# Patient Record
Sex: FEMALE | Race: WHITE | Employment: OTHER | URBAN - METROPOLITAN AREA
[De-identification: names, ages, dates, MRNs, and addresses within clinical notes are randomized per-mention and may not be internally consistent; named-entity substitution may affect disease eponyms.]

---

## 2017-01-09 ENCOUNTER — TELEPHONE (OUTPATIENT)
Dept: FAMILY MEDICINE CLINIC | Facility: CLINIC | Age: 82
End: 2017-01-09

## 2017-01-09 NOTE — TELEPHONE ENCOUNTER
Pt's son called. Pt was hospitalized at [facility] after falling off the couch and was treated for UTI. Currently she is at [facility] home. Pt's son is concerned with the meds she was given as she is now very weak and \"out of it\".  Since pt was

## 2017-01-17 ENCOUNTER — TELEPHONE (OUTPATIENT)
Dept: FAMILY MEDICINE CLINIC | Facility: CLINIC | Age: 82
End: 2017-01-17

## 2017-03-18 ENCOUNTER — HOSPITAL ENCOUNTER (EMERGENCY)
Facility: HOSPITAL | Age: 82
Discharge: HOME | End: 2017-03-18
Attending: EMERGENCY MEDICINE
Payer: MEDICARE

## 2017-03-18 VITALS
TEMPERATURE: 98 F | SYSTOLIC BLOOD PRESSURE: 127 MMHG | RESPIRATION RATE: 16 BRPM | HEIGHT: 65 IN | WEIGHT: 142.19 LBS | HEART RATE: 61 BPM | BODY MASS INDEX: 23.69 KG/M2 | DIASTOLIC BLOOD PRESSURE: 63 MMHG | OXYGEN SATURATION: 100 %

## 2017-03-18 DIAGNOSIS — E86.0 DEHYDRATION: ICD-10-CM

## 2017-03-18 DIAGNOSIS — N30.01 ACUTE CYSTITIS WITH HEMATURIA: Primary | ICD-10-CM

## 2017-03-18 LAB
ALBUMIN SERPL-MCNC: 3.5 G/DL (ref 3.5–4.8)
ALP LIVER SERPL-CCNC: 99 U/L (ref 55–142)
ALT SERPL-CCNC: 16 U/L (ref 14–54)
AST SERPL-CCNC: 14 U/L (ref 15–41)
BASOPHILS # BLD AUTO: 0.05 X10(3) UL (ref 0–0.1)
BASOPHILS NFR BLD AUTO: 0.7 %
BILIRUB SERPL-MCNC: 0.2 MG/DL (ref 0.1–2)
BILIRUB UR QL STRIP.AUTO: NEGATIVE
BUN BLD-MCNC: 28 MG/DL (ref 8–20)
CALCIUM BLD-MCNC: 9.5 MG/DL (ref 8.3–10.3)
CHLORIDE: 106 MMOL/L (ref 101–111)
CO2: 30 MMOL/L (ref 22–32)
COLOR UR AUTO: YELLOW
CREAT BLD-MCNC: 1.27 MG/DL (ref 0.55–1.02)
EOSINOPHIL # BLD AUTO: 0.31 X10(3) UL (ref 0–0.3)
EOSINOPHIL NFR BLD AUTO: 4.5 %
ERYTHROCYTE [DISTWIDTH] IN BLOOD BY AUTOMATED COUNT: 14.4 % (ref 11.5–16)
GLUCOSE BLD-MCNC: 123 MG/DL (ref 70–99)
GLUCOSE UR STRIP.AUTO-MCNC: NEGATIVE MG/DL
HCT VFR BLD AUTO: 37.6 % (ref 34–50)
HGB BLD-MCNC: 12.2 G/DL (ref 12–16)
IMMATURE GRANULOCYTE COUNT: 0.01 X10(3) UL (ref 0–1)
IMMATURE GRANULOCYTE RATIO %: 0.1 %
LYMPHOCYTES # BLD AUTO: 2.51 X10(3) UL (ref 0.9–4)
LYMPHOCYTES NFR BLD AUTO: 36.7 %
M PROTEIN MFR SERPL ELPH: 7.3 G/DL (ref 6.1–8.3)
MCH RBC QN AUTO: 31.2 PG (ref 27–33.2)
MCHC RBC AUTO-ENTMCNC: 32.4 G/DL (ref 31–37)
MCV RBC AUTO: 96.2 FL (ref 81–100)
MONOCYTES # BLD AUTO: 0.39 X10(3) UL (ref 0.1–0.6)
MONOCYTES NFR BLD AUTO: 5.7 %
NEUTROPHIL ABS PRELIM: 3.57 X10 (3) UL (ref 1.3–6.7)
NEUTROPHILS # BLD AUTO: 3.57 X10(3) UL (ref 1.3–6.7)
NEUTROPHILS NFR BLD AUTO: 52.3 %
NITRITE UR QL STRIP.AUTO: NEGATIVE
PH UR STRIP.AUTO: 5 [PH] (ref 4.5–8)
PLATELET # BLD AUTO: 215 10(3)UL (ref 150–450)
POTASSIUM SERPL-SCNC: 3.9 MMOL/L (ref 3.6–5.1)
PROT UR STRIP.AUTO-MCNC: 30 MG/DL
RBC # BLD AUTO: 3.91 X10(6)UL (ref 3.8–5.1)
RBC UR QL AUTO: NEGATIVE
RED CELL DISTRIBUTION WIDTH-SD: 50.5 FL (ref 35.1–46.3)
SODIUM SERPL-SCNC: 142 MMOL/L (ref 136–144)
SP GR UR STRIP.AUTO: 1.03 (ref 1–1.03)
UROBILINOGEN UR STRIP.AUTO-MCNC: <2 MG/DL
WBC # BLD AUTO: 6.8 X10(3) UL (ref 4–13)

## 2017-03-18 PROCEDURE — 99284 EMERGENCY DEPT VISIT MOD MDM: CPT

## 2017-03-18 PROCEDURE — 81001 URINALYSIS AUTO W/SCOPE: CPT | Performed by: EMERGENCY MEDICINE

## 2017-03-18 PROCEDURE — 96365 THER/PROPH/DIAG IV INF INIT: CPT

## 2017-03-18 PROCEDURE — 85025 COMPLETE CBC W/AUTO DIFF WBC: CPT | Performed by: EMERGENCY MEDICINE

## 2017-03-18 PROCEDURE — 80053 COMPREHEN METABOLIC PANEL: CPT | Performed by: EMERGENCY MEDICINE

## 2017-03-18 PROCEDURE — 87086 URINE CULTURE/COLONY COUNT: CPT | Performed by: EMERGENCY MEDICINE

## 2017-03-18 PROCEDURE — 87088 URINE BACTERIA CULTURE: CPT | Performed by: EMERGENCY MEDICINE

## 2017-03-18 PROCEDURE — 96361 HYDRATE IV INFUSION ADD-ON: CPT

## 2017-03-18 RX ORDER — SULFAMETHOXAZOLE AND TRIMETHOPRIM 800; 160 MG/1; MG/1
1 TABLET ORAL 2 TIMES DAILY
Qty: 14 TABLET | Refills: 0 | Status: SHIPPED | OUTPATIENT
Start: 2017-03-18 | End: 2017-03-25

## 2017-03-18 NOTE — ED INITIAL ASSESSMENT (HPI)
Per family (son), pt has been off antibiotics for UTI treatment since Thursday. Caregivers at [facility] have noticed increased weakness and confusion over the past couple of days.  Pt is currently alert to herself and birth date only, which is her normal

## 2017-04-14 ENCOUNTER — HOSPITAL ENCOUNTER (INPATIENT)
Facility: HOSPITAL | Age: 82
LOS: 2 days | Discharge: HOME HEALTH | DRG: 690 | End: 2017-04-16
Attending: EMERGENCY MEDICINE | Admitting: INTERNAL MEDICINE
Payer: MEDICARE

## 2017-04-14 ENCOUNTER — APPOINTMENT (OUTPATIENT)
Dept: CT IMAGING | Facility: HOSPITAL | Age: 82
DRG: 690 | End: 2017-04-14
Attending: INTERNAL MEDICINE
Payer: MEDICARE

## 2017-04-14 ENCOUNTER — APPOINTMENT (OUTPATIENT)
Dept: GENERAL RADIOLOGY | Facility: HOSPITAL | Age: 82
DRG: 690 | End: 2017-04-14
Attending: EMERGENCY MEDICINE
Payer: MEDICARE

## 2017-04-14 DIAGNOSIS — R41.82 ALTERED MENTAL STATUS, UNSPECIFIED ALTERED MENTAL STATUS TYPE: Primary | ICD-10-CM

## 2017-04-14 DIAGNOSIS — E87.6 HYPOKALEMIA: ICD-10-CM

## 2017-04-14 PROBLEM — G93.40 ACUTE ENCEPHALOPATHY: Status: ACTIVE | Noted: 2017-04-14

## 2017-04-14 PROBLEM — R53.81 PHYSICAL DECONDITIONING: Status: ACTIVE | Noted: 2017-04-14

## 2017-04-14 PROBLEM — D69.6 THROMBOCYTOPENIA (HCC): Status: ACTIVE | Noted: 2017-04-14

## 2017-04-14 PROCEDURE — 99223 1ST HOSP IP/OBS HIGH 75: CPT | Performed by: INTERNAL MEDICINE

## 2017-04-14 PROCEDURE — 70450 CT HEAD/BRAIN W/O DYE: CPT

## 2017-04-14 PROCEDURE — 71010 XR CHEST AP PORTABLE  (CPT=71010): CPT

## 2017-04-14 RX ORDER — DIAZEPAM 5 MG/ML
2.5 INJECTION, SOLUTION INTRAMUSCULAR; INTRAVENOUS ONCE
Status: COMPLETED | OUTPATIENT
Start: 2017-04-14 | End: 2017-04-14

## 2017-04-14 RX ORDER — CLONAZEPAM 0.5 MG/1
0.25 TABLET ORAL EVERY 6 HOURS PRN
Status: DISCONTINUED | OUTPATIENT
Start: 2017-04-14 | End: 2017-04-16

## 2017-04-14 RX ORDER — ESCITALOPRAM OXALATE 10 MG/1
10 TABLET ORAL EVERY MORNING
Status: DISCONTINUED | OUTPATIENT
Start: 2017-04-14 | End: 2017-04-16

## 2017-04-14 RX ORDER — SODIUM CHLORIDE 9 MG/ML
INJECTION, SOLUTION INTRAVENOUS CONTINUOUS
Status: DISCONTINUED | OUTPATIENT
Start: 2017-04-14 | End: 2017-04-16

## 2017-04-14 RX ORDER — CITALOPRAM 10 MG/1
30 TABLET ORAL NIGHTLY
Status: ON HOLD | COMMUNITY
End: 2020-12-22

## 2017-04-14 RX ORDER — DIPHENHYDRAMINE HYDROCHLORIDE 50 MG/ML
50 INJECTION INTRAMUSCULAR; INTRAVENOUS ONCE
Status: COMPLETED | OUTPATIENT
Start: 2017-04-14 | End: 2017-04-14

## 2017-04-14 RX ORDER — LEVOTHYROXINE SODIUM 0.12 MG/1
125 TABLET ORAL
Status: DISCONTINUED | OUTPATIENT
Start: 2017-04-15 | End: 2017-04-16

## 2017-04-14 RX ORDER — NITROFURANTOIN 25; 75 MG/1; MG/1
100 CAPSULE ORAL 2 TIMES DAILY
COMMUNITY
End: 2017-04-14

## 2017-04-14 RX ORDER — LORAZEPAM 2 MG/ML
0.5 INJECTION INTRAMUSCULAR ONCE
Status: COMPLETED | OUTPATIENT
Start: 2017-04-14 | End: 2017-04-14

## 2017-04-14 RX ORDER — SODIUM CHLORIDE 9 MG/ML
1000 INJECTION, SOLUTION INTRAVENOUS ONCE
Status: COMPLETED | OUTPATIENT
Start: 2017-04-14 | End: 2017-04-14

## 2017-04-14 RX ORDER — HALOPERIDOL 5 MG/ML
1 INJECTION INTRAMUSCULAR EVERY 6 HOURS PRN
Status: DISCONTINUED | OUTPATIENT
Start: 2017-04-14 | End: 2017-04-15

## 2017-04-14 RX ORDER — DONEPEZIL HYDROCHLORIDE 10 MG/1
10 TABLET, FILM COATED ORAL NIGHTLY
Status: DISCONTINUED | OUTPATIENT
Start: 2017-04-14 | End: 2017-04-16

## 2017-04-14 RX ORDER — METHYLPREDNISOLONE SODIUM SUCCINATE 125 MG/2ML
125 INJECTION, POWDER, LYOPHILIZED, FOR SOLUTION INTRAMUSCULAR; INTRAVENOUS ONCE
Status: COMPLETED | OUTPATIENT
Start: 2017-04-14 | End: 2017-04-14

## 2017-04-14 RX ORDER — DONEPEZIL HYDROCHLORIDE 10 MG/1
10 TABLET, FILM COATED ORAL NIGHTLY
Status: ON HOLD | COMMUNITY
End: 2021-02-08

## 2017-04-14 RX ORDER — LEVOTHYROXINE SODIUM 0.05 MG/1
50 TABLET ORAL
Status: ON HOLD | COMMUNITY
End: 2021-02-05

## 2017-04-14 RX ORDER — SODIUM CHLORIDE 9 MG/ML
INJECTION, SOLUTION INTRAVENOUS CONTINUOUS
Status: ACTIVE | OUTPATIENT
Start: 2017-04-14 | End: 2017-04-14

## 2017-04-14 RX ORDER — ONDANSETRON 2 MG/ML
4 INJECTION INTRAMUSCULAR; INTRAVENOUS EVERY 6 HOURS PRN
Status: DISCONTINUED | OUTPATIENT
Start: 2017-04-14 | End: 2017-04-16

## 2017-04-14 RX ORDER — ENOXAPARIN SODIUM 100 MG/ML
40 INJECTION SUBCUTANEOUS DAILY
Status: DISCONTINUED | OUTPATIENT
Start: 2017-04-14 | End: 2017-04-16

## 2017-04-14 RX ORDER — ACETAMINOPHEN 325 MG/1
650 TABLET ORAL EVERY 6 HOURS PRN
Status: DISCONTINUED | OUTPATIENT
Start: 2017-04-14 | End: 2017-04-16

## 2017-04-14 RX ORDER — SODIUM CHLORIDE 9 MG/ML
INJECTION, SOLUTION INTRAVENOUS ONCE
Status: COMPLETED | OUTPATIENT
Start: 2017-04-14 | End: 2017-04-14

## 2017-04-14 NOTE — PROGRESS NOTES
NURSING ADMISSION NOTE      Patient admitted via Cart  Oriented to room. Safety precautions initiated. Bed in low position. Call light in reach. Daughter called to complete admission navigator.

## 2017-04-14 NOTE — H&P
[facility]  History and Physical     Antonio Granados Patient Status:  Emergency    1935 MRN [id number]   Location [facility] Attending Gabriel Solis MD   Hosp Day # 0 PCP Aaliyah Bobo MD     Chief Complaint: A (36.7 °C) (Temporal)  Resp 18  Ht 5' 3\" (1.6 m)  Wt 135 lb (61.236 kg)  BMI 23.92 kg/m2  SpO2 99%  General: No acute distress drowsy   HEENT: Normocephalic atraumatic. Moist mucous membranes. EOM-I. PERRLA. Anicteric.  Old right eyebrow hematoma/small lace dehydration, IVF  8. Metabolic acidosis   9. Dementia with known behavioral disturbances  10.  Physical deconditioning - PT OT eval      Quality:  · DVT Prophylaxis: lovenox   · CODE status: dnr  · Acuna: none    Plan of care discussed with patient, her maged

## 2017-04-14 NOTE — ED NOTES
Patient continues on the cardiac monitor along with o2 sat 100% she finally is sleeping will continue to assess her rash

## 2017-04-14 NOTE — ED INITIAL ASSESSMENT (HPI)
Pt here for weakness, confusion and hypotension. Pt fell one week ago and has right sided head hematoma. Family notes that she is confused more so that usual and that this happens when she has a UTI.

## 2017-04-14 NOTE — ED NOTES
Lactic acid returned we will be following the Septic Protocol total will be 1,836 as of now patient had 1100

## 2017-04-14 NOTE — ED PROVIDER NOTES
Patient Seen in: [facility] Emergency Department    History   Patient presents with:  Hypotension (cardiovascular)    Stated Complaint: hypotension    HPI    31-year-old female that comes the hospital the chief complaint of increased weakness over t Smoking Status: Former Smoker                   Packs/Day: 0.00  Years:           Types: Cigarettes      Quit date: 06/25/1983    Smokeless Status: Never Used                        Alcohol Use: Yes                Comment: social      Review of Systems Notable for the following:     Urine Color Alda (*)     Clarity Urine Hazy (*)     Leukocyte Esterase Urine Trace (*)     Squamous Epi.  Cells Moderate (*)     Hyaline Casts Present (*)     Mucous Urine 3+ (*)     All other components within normal limits STATED HISTORY: (As transcribed by Technologist)  Patient with weakness, confusion and hypotension.         FINDINGS:  Cardiac size and pulmonary vasculature are within normal limits. No pleural effusions. No pneumothorax.  The minimal left basal atelectasi

## 2017-04-14 NOTE — ED NOTES
Straight cath patient noted redness to all ofo her extremities I called Dr. Nam Hernandez to the bedside sstarted second line and will administer benadryl and solumedrol

## 2017-04-15 ENCOUNTER — APPOINTMENT (OUTPATIENT)
Dept: ULTRASOUND IMAGING | Facility: HOSPITAL | Age: 82
DRG: 690 | End: 2017-04-15
Attending: UROLOGY
Payer: MEDICARE

## 2017-04-15 PROCEDURE — 99232 SBSQ HOSP IP/OBS MODERATE 35: CPT | Performed by: INTERNAL MEDICINE

## 2017-04-15 PROCEDURE — 76770 US EXAM ABDO BACK WALL COMP: CPT

## 2017-04-15 RX ORDER — CLONAZEPAM 0.5 MG/1
0.25 TABLET ORAL 2 TIMES DAILY
Status: DISCONTINUED | OUTPATIENT
Start: 2017-04-15 | End: 2017-04-16

## 2017-04-15 RX ORDER — HALOPERIDOL 5 MG/ML
1 INJECTION INTRAMUSCULAR EVERY 6 HOURS PRN
Status: DISCONTINUED | OUTPATIENT
Start: 2017-04-15 | End: 2017-04-16

## 2017-04-15 NOTE — PHYSICAL THERAPY NOTE
PHYSICAL THERAPY QUICK EVALUATION - INPATIENT    Room Number: 357/357-A  Evaluation Date: 4/15/2017  Presenting Problem: acute encephalopathy due to possible infectious process  Physician Order: PT Eval and Treat    Problem List  Principal Problem:    Acut wheelchair, bedside commode, etc.): A Little   -   Moving from lying on back to sitting on the side of the bed?: A Little   How much help from another person does the patient currently need. ..   -   Moving to and from a bed to a chair (including a wheelcha Assisted living;24 hour care/supervision    PLAN  Patient has been evaluated and presents with no skilled Physical Therapy needs at this time. Patient discharged from Physical Therapy services.   Please re-order if a new functional limitation presents duri

## 2017-04-15 NOTE — CONSULTS
[facility]  Report of Consultation    Gayla Haro Patient Status:  Inpatient    1935 MRN [id number]   [facility] 3SW-A Attending Bryan Townsend MD   Hosp Day # 1 PCP Alexa Longoria MD     Reason for Consultation:  Recurrent uti Systems:  Pertinent items are noted in HPI.     Physical Exam:  abd soft, no cva tenderness  Pelvic deferred to eventual cysto    Laboratory Data:         Lab Results  Component Value Date   COLORUR Alda 04/14/2017   CLARITY Hazy 04/14/2017   SPECGRAVITY 1

## 2017-04-15 NOTE — CM/SW NOTE
Received order for discharge planning/assessment. SW tried to reach pt's daughter listed on facesheet by phone. Left a voice message requesting she call SW back. Will await family call back.      [facility]

## 2017-04-15 NOTE — PLAN OF CARE
Patient resting in bed. Drowsy,arousable & verbally responsive. Uncooperative of care & combative,swinging arms on staff during care. Skin is intact apart from the old laceration which is steristipped on the R eyebrow & bruise on the R upper cheek due to a re

## 2017-04-15 NOTE — PROGRESS NOTES
[facility]  Progress Note     Josafat Corley Patient Status:  Inpatient    1935 MRN [id number]   [facility] 3SW-A Attending Surya Longoria MD   Hosp Day # 1 PCP Ximena Swartz MD     Chief Complaint: AMS    S: Patient more awak head    2. Cont abx    3. Blood cx and urine cx pending    2. Probable cystitis without hematuria    1. on empiric zosyn    2. Await urine cx result    3. She just completed another round of abx as outpt  4. Urology eval for recurrent UTI  5.  I have explai

## 2017-04-16 VITALS
HEIGHT: 63 IN | SYSTOLIC BLOOD PRESSURE: 142 MMHG | DIASTOLIC BLOOD PRESSURE: 78 MMHG | WEIGHT: 135 LBS | RESPIRATION RATE: 16 BRPM | BODY MASS INDEX: 23.92 KG/M2 | OXYGEN SATURATION: 99 % | TEMPERATURE: 98 F | HEART RATE: 53 BPM

## 2017-04-16 PROCEDURE — 99239 HOSP IP/OBS DSCHRG MGMT >30: CPT | Performed by: INTERNAL MEDICINE

## 2017-04-16 RX ORDER — AMOXICILLIN AND CLAVULANATE POTASSIUM 875; 125 MG/1; MG/1
1 TABLET, FILM COATED ORAL 2 TIMES DAILY
Qty: 14 TABLET | Refills: 0 | Status: SHIPPED | OUTPATIENT
Start: 2017-04-16 | End: 2017-04-16

## 2017-04-16 RX ORDER — CEPHALEXIN 250 MG/1
250 CAPSULE ORAL 2 TIMES DAILY
Qty: 10 CAPSULE | Refills: 0 | Status: SHIPPED | OUTPATIENT
Start: 2017-04-16 | End: 2017-04-16

## 2017-04-16 RX ORDER — AMOXICILLIN AND CLAVULANATE POTASSIUM 875; 125 MG/1; MG/1
1 TABLET, FILM COATED ORAL 2 TIMES DAILY
Qty: 14 TABLET | Refills: 0 | Status: SHIPPED | OUTPATIENT
Start: 2017-04-16 | End: 2017-04-26

## 2017-04-16 NOTE — PROGRESS NOTES
NURSING DISCHARGE NOTE    Discharged Home via Wheelchair. Accompanied by Family member  Belongings Taken by patient/family. All discharge instructions reviewed with daughter and caregiver. All questions answered.

## 2017-04-16 NOTE — CM/SW NOTE
04/16/17 1400   CM/SW Referral Data   Referral Source Physician   Reason for Referral Discharge planning;Psychoscial assessment   Informant (Daughter/Elyse)   Pertinent Medical Hx   Primary Care Physician Name Zoraida Mena.     Patient Info   Patient's Mental

## 2017-04-16 NOTE — DISCHARGE SUMMARY
[facility]  DISCHARGE SUMMARY     Edson Galeana Patient Status:  Inpatient    1935 MRN [id number]   [facility] 3SW-A Attending No att. providers found   Hosp Day # 2 PCP Guevara Chang MD     Date of Admission: 2017  Date N/V/diarrhea/cough/CP/SOB/ melena/hematochezia reported. Brief Synopsis:   Patient admitted for AMS and weakness likely due to UTI. Her outpatient cultures have shown staph epidermidis , however her hospital cultures to date are negative.  She has respo These medications were sent to [facility] [address] [facility] [address] [facility], [address] [facility] [address], [phone], [address] [facility], [place] [address]     Phone:  [phone]    - Amoxicillin-Pot Clavulanate [phone]

## 2017-04-16 NOTE — PROGRESS NOTES
[address] Patient Status:  Inpatient    1935 MRN [id number]   [facility] 3SW-A Attending Surya Longoria MD   Hosp Day # 2 PCP Ximena Swartz MD     Subjective: Interval History: has no complaint of dysuria. Naomi Nichole tetracycline and vanco.  Pt clinically worsened on macrobid. Will send home on augmentin for 7 days. F/u with Dr Melissa Estes at [facility].

## 2017-04-16 NOTE — PLAN OF CARE
Impaired Functional Mobility    • Achieve highest/safest level of mobility/gait Progressing        NEUROLOGICAL - ADULT    • Achieves stable or improved neurological status Progressing    • Absence of seizures Progressing    • Remains free of injury relate

## 2017-04-16 NOTE — OCCUPATIONAL THERAPY NOTE
OCCUPATIONAL THERAPY QUICK EVALUATION - INPATIENT    Room Number: 357/357-A  Evaluation Date: 4/16/2017     Type of Evaluation: Quick Eval  Presenting Problem: Acute encephalopathy    Physician Order: IP Consult to Occupational Therapy  Reason for Therapy: living staff help with additional morning and evening routines.  She reports patient is typically total assist for all UB/LB dressing, UB/LB bathing, shower transfers, toileting, and grooming; she reports patient can attempt toilet transfers and bed mobilit Taking care of personal grooming such as brushing teeth?: Total  -   Eating meals?: A Little    AM-PAC Score:  Score: 8  Approx Degree of Impairment: 85.69%  Standardized Score (AM-PAC Scale): 22.86  CMS Modifier (G-Code): CM    FUNCTIONAL TRANSFER ASSESSM no skilled Occupational Therapy needs at this time. Patient discharged from Occupational Therapy services. Please re-order if a new functional limitation presents during this admission.     Patient was able to achieve the following goals:  Patient able to

## 2017-04-19 ENCOUNTER — TELEPHONE (OUTPATIENT)
Dept: FAMILY MEDICINE CLINIC | Facility: CLINIC | Age: 82
End: 2017-04-19

## 2017-12-31 ENCOUNTER — APPOINTMENT (OUTPATIENT)
Dept: CT IMAGING | Facility: HOSPITAL | Age: 82
End: 2017-12-31
Attending: EMERGENCY MEDICINE
Payer: MEDICARE

## 2017-12-31 ENCOUNTER — HOSPITAL ENCOUNTER (OUTPATIENT)
Facility: HOSPITAL | Age: 82
Setting detail: OBSERVATION
Discharge: NURSING, REHAB OR LONG-TERM CARE | End: 2018-01-02
Attending: EMERGENCY MEDICINE | Admitting: INTERNAL MEDICINE
Payer: MEDICARE

## 2017-12-31 ENCOUNTER — APPOINTMENT (OUTPATIENT)
Dept: GENERAL RADIOLOGY | Facility: HOSPITAL | Age: 82
End: 2017-12-31
Attending: EMERGENCY MEDICINE
Payer: MEDICARE

## 2017-12-31 DIAGNOSIS — G45.9 TRANSIENT CEREBRAL ISCHEMIA, UNSPECIFIED TYPE: Primary | ICD-10-CM

## 2017-12-31 LAB
ALBUMIN SERPL-MCNC: 3.6 G/DL (ref 3.5–4.8)
ALP LIVER SERPL-CCNC: 84 U/L (ref 55–142)
ALT SERPL-CCNC: 12 U/L (ref 14–54)
AST SERPL-CCNC: 12 U/L (ref 15–41)
BASOPHILS # BLD AUTO: 0.03 X10(3) UL (ref 0–0.1)
BASOPHILS NFR BLD AUTO: 0.4 %
BILIRUB SERPL-MCNC: 0.3 MG/DL (ref 0.1–2)
BILIRUB UR QL STRIP.AUTO: NEGATIVE
BUN BLD-MCNC: 21 MG/DL (ref 8–20)
CALCIUM BLD-MCNC: 9.2 MG/DL (ref 8.3–10.3)
CHLORIDE: 102 MMOL/L (ref 101–111)
CLARITY UR REFRACT.AUTO: CLEAR
CO2: 29 MMOL/L (ref 22–32)
COLOR UR AUTO: YELLOW
CREAT BLD-MCNC: 1.08 MG/DL (ref 0.55–1.02)
EOSINOPHIL # BLD AUTO: 0.17 X10(3) UL (ref 0–0.3)
EOSINOPHIL NFR BLD AUTO: 2.2 %
ERYTHROCYTE [DISTWIDTH] IN BLOOD BY AUTOMATED COUNT: 12.7 % (ref 11.5–16)
FREE T4: 2 NG/DL (ref 0.9–1.8)
GLUCOSE BLD-MCNC: 85 MG/DL (ref 70–99)
GLUCOSE BLD-MCNC: 91 MG/DL (ref 65–99)
GLUCOSE UR STRIP.AUTO-MCNC: NEGATIVE MG/DL
HCT VFR BLD AUTO: 39.2 % (ref 34–50)
HGB BLD-MCNC: 12.8 G/DL (ref 12–16)
IMMATURE GRANULOCYTE COUNT: 0.02 X10(3) UL (ref 0–1)
IMMATURE GRANULOCYTE RATIO %: 0.3 %
INR BLD: 1.03 (ref 0.89–1.11)
KETONES UR STRIP.AUTO-MCNC: NEGATIVE MG/DL
LEUKOCYTE ESTERASE UR QL STRIP.AUTO: NEGATIVE
LYMPHOCYTES # BLD AUTO: 2.15 X10(3) UL (ref 0.9–4)
LYMPHOCYTES NFR BLD AUTO: 27.6 %
M PROTEIN MFR SERPL ELPH: 6.9 G/DL (ref 6.1–8.3)
MCH RBC QN AUTO: 30.8 PG (ref 27–33.2)
MCHC RBC AUTO-ENTMCNC: 32.7 G/DL (ref 31–37)
MCV RBC AUTO: 94.5 FL (ref 81–100)
MONOCYTES # BLD AUTO: 0.43 X10(3) UL (ref 0.1–0.6)
MONOCYTES NFR BLD AUTO: 5.5 %
NEUTROPHIL ABS PRELIM: 4.98 X10 (3) UL (ref 1.3–6.7)
NEUTROPHILS # BLD AUTO: 4.98 X10(3) UL (ref 1.3–6.7)
NEUTROPHILS NFR BLD AUTO: 64 %
NITRITE UR QL STRIP.AUTO: NEGATIVE
PH UR STRIP.AUTO: 6 [PH] (ref 4.5–8)
PLATELET # BLD AUTO: 181 10(3)UL (ref 150–450)
POTASSIUM SERPL-SCNC: 4.2 MMOL/L (ref 3.6–5.1)
PROT UR STRIP.AUTO-MCNC: NEGATIVE MG/DL
PSA SERPL DL<=0.01 NG/ML-MCNC: 13.5 SECONDS (ref 12–14.3)
RBC # BLD AUTO: 4.15 X10(6)UL (ref 3.8–5.1)
RBC UR QL AUTO: NEGATIVE
RED CELL DISTRIBUTION WIDTH-SD: 44.1 FL (ref 35.1–46.3)
SODIUM SERPL-SCNC: 138 MMOL/L (ref 136–144)
SP GR UR STRIP.AUTO: 1.02 (ref 1–1.03)
TROPONIN: <0.046 NG/ML (ref ?–0.05)
TSI SER-ACNC: <0.005 MIU/ML (ref 0.35–5.5)
UROBILINOGEN UR STRIP.AUTO-MCNC: <2 MG/DL
WBC # BLD AUTO: 7.8 X10(3) UL (ref 4–13)

## 2017-12-31 PROCEDURE — 71010 XR CHEST AP PORTABLE  (CPT=71010): CPT | Performed by: EMERGENCY MEDICINE

## 2017-12-31 PROCEDURE — 72125 CT NECK SPINE W/O DYE: CPT | Performed by: EMERGENCY MEDICINE

## 2017-12-31 PROCEDURE — 70450 CT HEAD/BRAIN W/O DYE: CPT | Performed by: EMERGENCY MEDICINE

## 2017-12-31 PROCEDURE — 99220 INITIAL OBSERVATION CARE,LEVL III: CPT | Performed by: HOSPITALIST

## 2017-12-31 RX ORDER — ACETAMINOPHEN 650 MG/1
650 SUPPOSITORY RECTAL EVERY 4 HOURS PRN
Status: DISCONTINUED | OUTPATIENT
Start: 2017-12-31 | End: 2018-01-02

## 2017-12-31 RX ORDER — SODIUM CHLORIDE 9 MG/ML
INJECTION, SOLUTION INTRAVENOUS CONTINUOUS
Status: ACTIVE | OUTPATIENT
Start: 2017-12-31 | End: 2017-12-31

## 2017-12-31 RX ORDER — DOCUSATE SODIUM 100 MG/1
100 CAPSULE, LIQUID FILLED ORAL 2 TIMES DAILY
Status: DISCONTINUED | OUTPATIENT
Start: 2017-12-31 | End: 2018-01-02

## 2017-12-31 RX ORDER — METOCLOPRAMIDE HYDROCHLORIDE 5 MG/ML
5 INJECTION INTRAMUSCULAR; INTRAVENOUS EVERY 8 HOURS PRN
Status: DISCONTINUED | OUTPATIENT
Start: 2017-12-31 | End: 2018-01-02

## 2017-12-31 RX ORDER — POLYETHYLENE GLYCOL 3350 17 G/17G
17 POWDER, FOR SOLUTION ORAL DAILY PRN
Status: DISCONTINUED | OUTPATIENT
Start: 2017-12-31 | End: 2018-01-02

## 2017-12-31 RX ORDER — FAMOTIDINE 10 MG/ML
20 INJECTION, SOLUTION INTRAVENOUS NIGHTLY
Status: DISCONTINUED | OUTPATIENT
Start: 2017-12-31 | End: 2018-01-02

## 2017-12-31 RX ORDER — ONDANSETRON 2 MG/ML
4 INJECTION INTRAMUSCULAR; INTRAVENOUS EVERY 6 HOURS PRN
Status: DISCONTINUED | OUTPATIENT
Start: 2017-12-31 | End: 2018-01-02

## 2017-12-31 RX ORDER — ASPIRIN 325 MG
325 TABLET ORAL DAILY
Status: DISCONTINUED | OUTPATIENT
Start: 2017-12-31 | End: 2018-01-02

## 2017-12-31 RX ORDER — ACETAMINOPHEN 325 MG/1
650 TABLET ORAL EVERY 6 HOURS PRN
Status: DISCONTINUED | OUTPATIENT
Start: 2017-12-31 | End: 2017-12-31 | Stop reason: SDUPTHER

## 2017-12-31 RX ORDER — SODIUM CHLORIDE 9 MG/ML
INJECTION, SOLUTION INTRAVENOUS CONTINUOUS
Status: DISCONTINUED | OUTPATIENT
Start: 2017-12-31 | End: 2018-01-02

## 2017-12-31 RX ORDER — ASPIRIN 300 MG
300 SUPPOSITORY, RECTAL RECTAL DAILY
Status: DISCONTINUED | OUTPATIENT
Start: 2017-12-31 | End: 2018-01-02

## 2017-12-31 RX ORDER — HALOPERIDOL 5 MG/ML
2 INJECTION INTRAMUSCULAR EVERY 6 HOURS PRN
Status: DISCONTINUED | OUTPATIENT
Start: 2017-12-31 | End: 2018-01-02

## 2017-12-31 RX ORDER — METHENAMINE HIPPURATE 1000 MG/1
1000 TABLET ORAL DAILY
Status: DISCONTINUED | OUTPATIENT
Start: 2017-12-31 | End: 2018-01-02

## 2017-12-31 RX ORDER — DONEPEZIL HYDROCHLORIDE 10 MG/1
10 TABLET, FILM COATED ORAL NIGHTLY
Status: DISCONTINUED | OUTPATIENT
Start: 2017-12-31 | End: 2018-01-02

## 2017-12-31 RX ORDER — ATORVASTATIN CALCIUM 80 MG/1
80 TABLET, FILM COATED ORAL NIGHTLY
Status: DISCONTINUED | OUTPATIENT
Start: 2017-12-31 | End: 2018-01-02

## 2017-12-31 RX ORDER — TEMAZEPAM 15 MG/1
15 CAPSULE ORAL NIGHTLY PRN
Status: DISCONTINUED | OUTPATIENT
Start: 2017-12-31 | End: 2017-12-31

## 2017-12-31 RX ORDER — CLONAZEPAM 0.5 MG/1
0.25 TABLET ORAL 2 TIMES DAILY
Status: DISCONTINUED | OUTPATIENT
Start: 2017-12-31 | End: 2018-01-02

## 2017-12-31 RX ORDER — CLONAZEPAM 0.5 MG/1
0.25 TABLET ORAL EVERY 6 HOURS PRN
Status: DISCONTINUED | OUTPATIENT
Start: 2017-12-31 | End: 2018-01-02

## 2017-12-31 RX ORDER — FAMOTIDINE 20 MG/1
20 TABLET ORAL NIGHTLY
Status: DISCONTINUED | OUTPATIENT
Start: 2017-12-31 | End: 2018-01-02

## 2017-12-31 RX ORDER — ESCITALOPRAM OXALATE 10 MG/1
10 TABLET ORAL EVERY MORNING
Status: DISCONTINUED | OUTPATIENT
Start: 2018-01-01 | End: 2018-01-02

## 2017-12-31 RX ORDER — LABETALOL HYDROCHLORIDE 5 MG/ML
10 INJECTION, SOLUTION INTRAVENOUS EVERY 10 MIN PRN
Status: DISCONTINUED | OUTPATIENT
Start: 2017-12-31 | End: 2018-01-01

## 2017-12-31 RX ORDER — BISACODYL 10 MG
10 SUPPOSITORY, RECTAL RECTAL
Status: DISCONTINUED | OUTPATIENT
Start: 2017-12-31 | End: 2018-01-02

## 2017-12-31 RX ORDER — ACETAMINOPHEN 325 MG/1
650 TABLET ORAL EVERY 4 HOURS PRN
Status: DISCONTINUED | OUTPATIENT
Start: 2017-12-31 | End: 2018-01-02

## 2017-12-31 RX ORDER — LEVOTHYROXINE SODIUM 0.12 MG/1
125 TABLET ORAL
Status: DISCONTINUED | OUTPATIENT
Start: 2018-01-01 | End: 2018-01-02

## 2017-12-31 RX ORDER — SENNOSIDES 8.6 MG
17.2 TABLET ORAL NIGHTLY
Status: DISCONTINUED | OUTPATIENT
Start: 2017-12-31 | End: 2018-01-02

## 2017-12-31 RX ORDER — ASPIRIN 81 MG/1
324 TABLET, CHEWABLE ORAL ONCE
Status: COMPLETED | OUTPATIENT
Start: 2017-12-31 | End: 2017-12-31

## 2017-12-31 RX ORDER — SODIUM PHOSPHATE, DIBASIC AND SODIUM PHOSPHATE, MONOBASIC 7; 19 G/133ML; G/133ML
1 ENEMA RECTAL ONCE AS NEEDED
Status: DISCONTINUED | OUTPATIENT
Start: 2017-12-31 | End: 2018-01-02

## 2017-12-31 NOTE — ED PROVIDER NOTES
Patient Seen in: [facility] Emergency Department    History   Patient presents with:  Altered Mental Status (neurologic)    Stated Complaint: AMS    HPI    Patient is an 31-year-old with a history of dementia hypothyroidism hypertension.   History is therefore taken.     Physical Exam   ED Triage Vitals [12/31/17 1521]  BP: 117/77  Pulse: 69  Resp: 18  Temp: 97.7 °F (36.5 °C)  Temp src: Temporal  SpO2: 94 %  O2 Device: None (Room air)    Current:/73   Pulse 53   Temp 97.7 °F (36.5 °C) (Temporal) for panel order CBC WITH DIFFERENTIAL WITH PLATELET.   Procedure                               Abnormality         Status                     ---------                               -----------         ------                     CBC W/ DIFFERENTIAL[[id number] really participate. Her caregiver was concerned she was having pain in her legs and chest earlier.   She does not have any pain with range of motion of any of her extremities or tenderness of her hips or low back, she does not have pain with rolling over i

## 2017-12-31 NOTE — ED INITIAL ASSESSMENT (HPI)
Per EMS report, patient had difficulty swallowing and speaking this morning. Sx have since resolved. Last known well time last night. Patient A&O to self only, which is her baseline per report.     Per caregiver- she gets frequent UTIs and has had increased
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
## 2018-01-01 ENCOUNTER — APPOINTMENT (OUTPATIENT)
Dept: CT IMAGING | Facility: HOSPITAL | Age: 83
End: 2018-01-01
Attending: Other
Payer: MEDICARE

## 2018-01-01 LAB
CHOLEST SMN-MCNC: 189 MG/DL (ref ?–200)
EST. AVERAGE GLUCOSE BLD GHB EST-MCNC: 120 MG/DL (ref 68–126)
GLUCOSE BLD-MCNC: 77 MG/DL (ref 65–99)
GLUCOSE BLD-MCNC: 79 MG/DL (ref 65–99)
GLUCOSE BLD-MCNC: 80 MG/DL (ref 65–99)
GLUCOSE BLD-MCNC: 93 MG/DL (ref 65–99)
HBA1C MFR BLD HPLC: 5.8 % (ref ?–5.7)
HDLC SERPL-MCNC: 54 MG/DL (ref 45–?)
HDLC SERPL: 3.5 {RATIO} (ref ?–4.44)
INR BLD: 1.1 (ref 0.89–1.11)
LDLC SERPL CALC-MCNC: 117 MG/DL (ref ?–130)
NONHDLC SERPL-MCNC: 135 MG/DL (ref ?–130)
PSA SERPL DL<=0.01 NG/ML-MCNC: 14.2 SECONDS (ref 12–14.3)
TRIGL SERPL-MCNC: 90 MG/DL (ref ?–150)
VLDLC SERPL CALC-MCNC: 18 MG/DL (ref 5–40)

## 2018-01-01 PROCEDURE — 99205 OFFICE O/P NEW HI 60 MIN: CPT | Performed by: OTHER

## 2018-01-01 PROCEDURE — 99225 SUBSEQUENT OBSERVATION CARE: CPT | Performed by: HOSPITALIST

## 2018-01-01 RX ORDER — ASPIRIN 81 MG/1
81 TABLET, CHEWABLE ORAL DAILY
Qty: 30 TABLET | Refills: 0 | Status: SHIPPED | OUTPATIENT
Start: 2018-01-01 | End: 2018-01-31

## 2018-01-01 RX ORDER — MEMANTINE HYDROCHLORIDE 10 MG/1
10 TABLET ORAL 2 TIMES DAILY
Status: ON HOLD | COMMUNITY
End: 2021-02-08

## 2018-01-01 NOTE — PLAN OF CARE
Assumed care at 0700. Alert to self (baseline), follows some commands, agitated at times when getting vitals. SB on tele. Denies pain. Family at bedside updated on poc. Incontinent and briefed, changed frequently. Awating CT and carotid doppler.  Will arturo

## 2018-01-01 NOTE — CONSULTS
[facility] Pharmacy Note:  Renal Dose Adjustment for Metoclopramide (REGLAN)    Edson Galeana has been prescribed Metoclopramide (REGLAN) 10 mg every 8 hours as needed for n/v.    Estimated Creatinine Clearance: 33.2 mL/min (based on SCr of 1.08 mg/dL (H)).

## 2018-01-01 NOTE — PLAN OF CARE
Admit patient to unit at [address] [place] [address]. Pt alert and oriented xself - this is patient's baseline. Neuro checks Q2 per stroke protocol. No changes. VSS on room air, BP's soft. SB per tele, HR in 40's throughout night. Dr. Vandana Rodriguez notified. EKG obtained.  Pt asymp

## 2018-01-01 NOTE — H&P
[facility]  History and Physical     Alejandraanna Yu Patient Status:  Emergency    1935 MRN [id number]   Location [address] [facility] Attending Jenifer Sinclair MD   Hosp Day # 0 PCP Behzad Foreman MD     Chief Complaint: Levothyroxine Sodium 125 MCG Oral Tab Take 125 mcg by mouth before breakfast. Disp:  Rfl:    ClonazePAM 0.25 mg Oral Tab Take 0.25 mg by mouth 2 (two) times daily. Disp:  Rfl:    Donepezil HCl 10 MG Oral Tab Take 10 mg by mouth nightly.  Disp:  Rfl:    Cl 1606   PTP  13.5   INR  1.03       Recent Labs   Lab  12/31/17   1606   TROP  <0.046       Imaging: Imaging data reviewed in Epic. ASSESSMENT / PLAN:     1. Transient right-sided weakness and dysarthria with worsening confusion  1.  Mental status now b

## 2018-01-01 NOTE — PROGRESS NOTES
[facility]  Progress Note     Shannon Gutierrez Patient Status:  Observation    1935 MRN [id number]   [facility] 7NE-A Attending Jose Myles MD   Hosp Day # 0 PCP Darerll Vidal MD     Chief Complaint: right sided weakness    S Senna  17.2 mg Oral Nightly   • docusate sodium  100 mg Oral BID   • famoTIDine  20 mg Oral Nightly    Or   • famoTIDine  20 mg Intravenous Nightly       ASSESSMENT / PLAN:     1. Transient right-sided weakness and dysarthria with worsening confusion  1.  [place]

## 2018-01-01 NOTE — PHYSICAL THERAPY NOTE
Attempted PT eval at this time, however PCT currently feeding Pt breakfast. Will re-attempt later today as time allows.

## 2018-01-01 NOTE — CONSULTS
Neurology H&P    Celestino Goyal Patient Status:  Observation    1935 MRN [id number]   [facility] 7NE-A Attending Maikol Dominguez MD   Hosp Day # 0 PCP Cristela Wheat MD     Subjective:  Celestino Goyal is a(n) 80year old female with a Packs/day: 0.00      Years: 0.00         Types: Cigarettes     Quit date: 6/25/1983  Smokeless tobacco: Never Used                      Alcohol use:  Yes              Comment: social      Family History:  Family History   Problem Relation Ag 12/31/2017   INR 1.10 01/01/2018   PTP 14.2 01/01/2018   T4F 2.0 12/31/2017   TSH <0.005 12/31/2017   TROP <0.046 12/31/2017   PGLU 77 01/01/2018       Imaging:  [facility] 12/31/17     FINDINGS:  Chronic microvascular changes within the cerebral white matter are

## 2018-01-02 ENCOUNTER — APPOINTMENT (OUTPATIENT)
Dept: CT IMAGING | Facility: HOSPITAL | Age: 83
End: 2018-01-02
Attending: Other
Payer: MEDICARE

## 2018-01-02 ENCOUNTER — HOSPITAL ENCOUNTER (OUTPATIENT)
Dept: ULTRASOUND IMAGING | Facility: HOSPITAL | Age: 83
Setting detail: OBSERVATION
End: 2018-01-02
Attending: Other
Payer: MEDICARE

## 2018-01-02 ENCOUNTER — APPOINTMENT (OUTPATIENT)
Dept: ULTRASOUND IMAGING | Facility: HOSPITAL | Age: 83
End: 2018-01-02
Attending: Other
Payer: MEDICARE

## 2018-01-02 VITALS
BODY MASS INDEX: 23.91 KG/M2 | HEIGHT: 63 IN | SYSTOLIC BLOOD PRESSURE: 101 MMHG | HEART RATE: 60 BPM | RESPIRATION RATE: 18 BRPM | DIASTOLIC BLOOD PRESSURE: 75 MMHG | OXYGEN SATURATION: 95 % | TEMPERATURE: 98 F | WEIGHT: 134.94 LBS

## 2018-01-02 LAB
ATRIAL RATE: 43 BPM
GLUCOSE BLD-MCNC: 92 MG/DL (ref 65–99)
P AXIS: 76 DEGREES
P-R INTERVAL: 176 MS
Q-T INTERVAL: 512 MS
QRS DURATION: 74 MS
QTC CALCULATION (BEZET): 432 MS
R AXIS: 26 DEGREES
T AXIS: 79 DEGREES
VENTRICULAR RATE: 43 BPM

## 2018-01-02 PROCEDURE — 99217 OBSERVATION CARE DISCHARGE: CPT | Performed by: HOSPITALIST

## 2018-01-02 PROCEDURE — 99214 OFFICE O/P EST MOD 30 MIN: CPT | Performed by: OTHER

## 2018-01-02 PROCEDURE — 70450 CT HEAD/BRAIN W/O DYE: CPT | Performed by: OTHER

## 2018-01-02 RX ORDER — ASPIRIN 81 MG/1
81 TABLET, CHEWABLE ORAL DAILY
Status: DISCONTINUED | OUTPATIENT
Start: 2018-01-03 | End: 2018-01-02

## 2018-01-02 RX ORDER — ATORVASTATIN CALCIUM 20 MG/1
20 TABLET, FILM COATED ORAL NIGHTLY
Qty: 30 TABLET | Refills: 0 | Status: ON HOLD | OUTPATIENT
Start: 2018-01-02 | End: 2021-01-31

## 2018-01-02 RX ORDER — ATORVASTATIN CALCIUM 20 MG/1
20 TABLET, FILM COATED ORAL NIGHTLY
Status: DISCONTINUED | OUTPATIENT
Start: 2018-01-02 | End: 2018-01-02

## 2018-01-02 NOTE — CM/SW NOTE
Pt ready for d/c today - she will return to [facility] in [place]. Dtr would like pt transported by ambulance - pt is confused and can be combative.   [facility] to  pt at 1:30pm.

## 2018-01-02 NOTE — PROGRESS NOTES
[id number] Neurology Progress Note    Alejandra Yu Patient Status:  Observation    1935 MRN [id number]   [facility] 7NE-A Attending Olena Upton,  [facility] Se Day # 0 PCP Behzad Foreman MD         Subjective:  Alejandra Yu laryngitis, without mention of obstruction     Anxiety state, unspecified     Urinary tract infection, site not specified     Dysuria     Unspecified vitamin deficiency     Diarrhea     Essential hypertension, benign     Pernicious anemia     Unspecified h face grossly symmetric, Resists dolls, tongue midline  MSK: NO focal weakness, moves all extremities       A/P:  This is an 81 y/o female with very advanced dementia with behavior changes and a possible episode of R arm and R leg weakness.  Per daughter she

## 2018-01-02 NOTE — OCCUPATIONAL THERAPY NOTE
Received OT evaluation order per stroke protocol. Pt was admitted with R sided weakness. Symptoms resolved, per RN. Per chart review, pt has been dependent for all ADL. Pt lives at [facility]. OT will sign off. Spoke with RN.

## 2018-01-02 NOTE — PHYSICAL THERAPY NOTE
PT consult received per Stroke Protocol. Pt admitted for transient RUE/LE weakness, which has now resolved per RN. Pt lives at [place] [address] with 24 hr care and has a caregiver. She has dementia with agitation.  Per PT eval in April 2017, pt not appropriat

## 2018-01-02 NOTE — CM/SW NOTE
01/02/18 1400   Discharge disposition   Discharged to: [address]   Name of Facillity/Home Care/Hospice ([address] [place] [address])   Discharge transportation Other (comment)  (Susana)

## 2018-01-02 NOTE — PLAN OF CARE
Discharge instructions, prescriptions, medications & follow-up appointments reviewed with caregiver and copy of instructions sent for daughter. Discharge packet sent to Assisted living with Ambulance drivers. Daughter notified of discharge time.   Natanael Keller
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
## 2018-01-03 LAB
ATRIAL RATE: 53 BPM
P AXIS: 40 DEGREES
P-R INTERVAL: 160 MS
Q-T INTERVAL: 422 MS
QRS DURATION: 80 MS
QTC CALCULATION (BEZET): 395 MS
R AXIS: 21 DEGREES
T AXIS: 93 DEGREES
VENTRICULAR RATE: 53 BPM

## 2018-01-04 NOTE — DISCHARGE SUMMARY
[facility] HOSPITALIST  DISCHARGE SUMMARY     Lidia Cordova Patient Status:  Observation    1935 MRN [id number]   [facility] 7NE-A Attending Ina Reynoso, DO   Hosp Day # 0 PCP Rupesh Choi MD     Date of Admission: 2017  Date of of sedation for further imaging outweigh the clinical benefit. She did have a repeat head CT which was unremarkable. As his symptoms had resolved she was stable for discharge home. She was seen by neurology service.   Aspirin and statin were added to her [address] [facility], [phone], [address] [facility] [address]    Hours:  24-hours Phone:  [phone]   · aspirin 81 MG Chew  · atorvastatin 20 MG Tabs         Follow-up appointment: Kirit Silva

## 2018-06-15 ENCOUNTER — LAB ENCOUNTER (OUTPATIENT)
Dept: LAB | Age: 83
End: 2018-06-15
Attending: UROLOGY
Payer: MEDICARE

## 2018-06-15 DIAGNOSIS — N39.0 URINARY TRACT INFECTION WITHOUT HEMATURIA, SITE UNSPECIFIED: ICD-10-CM

## 2018-06-15 PROCEDURE — 87086 URINE CULTURE/COLONY COUNT: CPT

## 2018-06-15 PROCEDURE — 87186 SC STD MICRODIL/AGAR DIL: CPT

## 2018-06-15 PROCEDURE — 81001 URINALYSIS AUTO W/SCOPE: CPT

## 2018-06-15 PROCEDURE — 87088 URINE BACTERIA CULTURE: CPT

## 2018-07-07 ENCOUNTER — HOSPITAL ENCOUNTER (EMERGENCY)
Facility: HOSPITAL | Age: 83
Discharge: HOME | End: 2018-07-07
Attending: EMERGENCY MEDICINE
Payer: MEDICARE

## 2018-07-07 VITALS
SYSTOLIC BLOOD PRESSURE: 109 MMHG | WEIGHT: 108 LBS | HEIGHT: 62 IN | BODY MASS INDEX: 19.88 KG/M2 | DIASTOLIC BLOOD PRESSURE: 78 MMHG | OXYGEN SATURATION: 99 % | RESPIRATION RATE: 16 BRPM | HEART RATE: 84 BPM | TEMPERATURE: 98 F

## 2018-07-07 DIAGNOSIS — R19.7 DIARRHEA, UNSPECIFIED TYPE: Primary | ICD-10-CM

## 2018-07-07 LAB
ALBUMIN SERPL-MCNC: 3.5 G/DL (ref 3.5–4.8)
ALP LIVER SERPL-CCNC: 91 U/L (ref 55–142)
ALT SERPL-CCNC: 19 U/L (ref 14–54)
AST SERPL-CCNC: 20 U/L (ref 15–41)
BASOPHILS # BLD AUTO: 0.02 X10(3) UL (ref 0–0.1)
BASOPHILS NFR BLD AUTO: 0.2 %
BILIRUB SERPL-MCNC: 0.4 MG/DL (ref 0.1–2)
BUN BLD-MCNC: 23 MG/DL (ref 8–20)
CALCIUM BLD-MCNC: 9.5 MG/DL (ref 8.3–10.3)
CHLORIDE: 107 MMOL/L (ref 101–111)
CO2: 25 MMOL/L (ref 22–32)
CREAT BLD-MCNC: 1.32 MG/DL (ref 0.55–1.02)
EOSINOPHIL # BLD AUTO: 0.18 X10(3) UL (ref 0–0.3)
EOSINOPHIL NFR BLD AUTO: 2.1 %
ERYTHROCYTE [DISTWIDTH] IN BLOOD BY AUTOMATED COUNT: 12.8 % (ref 11.5–16)
GLUCOSE BLD-MCNC: 86 MG/DL (ref 70–99)
HCT VFR BLD AUTO: 36.7 % (ref 34–50)
HGB BLD-MCNC: 12.2 G/DL (ref 12–16)
IMMATURE GRANULOCYTE COUNT: 0.02 X10(3) UL (ref 0–1)
IMMATURE GRANULOCYTE RATIO %: 0.2 %
LYMPHOCYTES # BLD AUTO: 2.36 X10(3) UL (ref 0.9–4)
LYMPHOCYTES NFR BLD AUTO: 27.9 %
M PROTEIN MFR SERPL ELPH: 6.7 G/DL (ref 6.1–8.3)
MCH RBC QN AUTO: 31.2 PG (ref 27–33.2)
MCHC RBC AUTO-ENTMCNC: 33.2 G/DL (ref 31–37)
MCV RBC AUTO: 93.9 FL (ref 81–100)
MONOCYTES # BLD AUTO: 0.39 X10(3) UL (ref 0.1–1)
MONOCYTES NFR BLD AUTO: 4.6 %
NEUTROPHIL ABS PRELIM: 5.48 X10 (3) UL (ref 1.3–6.7)
NEUTROPHILS # BLD AUTO: 5.48 X10(3) UL (ref 1.3–6.7)
NEUTROPHILS NFR BLD AUTO: 65 %
PLATELET # BLD AUTO: 155 10(3)UL (ref 150–450)
POTASSIUM SERPL-SCNC: 5.1 MMOL/L (ref 3.6–5.1)
RBC # BLD AUTO: 3.91 X10(6)UL (ref 3.8–5.1)
RED CELL DISTRIBUTION WIDTH-SD: 43.8 FL (ref 35.1–46.3)
SODIUM SERPL-SCNC: 143 MMOL/L (ref 136–144)
WBC # BLD AUTO: 8.5 X10(3) UL (ref 4–13)

## 2018-07-07 PROCEDURE — 96360 HYDRATION IV INFUSION INIT: CPT

## 2018-07-07 PROCEDURE — 93010 ELECTROCARDIOGRAM REPORT: CPT

## 2018-07-07 PROCEDURE — 93005 ELECTROCARDIOGRAM TRACING: CPT

## 2018-07-07 PROCEDURE — 80053 COMPREHEN METABOLIC PANEL: CPT | Performed by: EMERGENCY MEDICINE

## 2018-07-07 PROCEDURE — 85025 COMPLETE CBC W/AUTO DIFF WBC: CPT | Performed by: EMERGENCY MEDICINE

## 2018-07-07 PROCEDURE — 99285 EMERGENCY DEPT VISIT HI MDM: CPT

## 2018-07-07 PROCEDURE — 99284 EMERGENCY DEPT VISIT MOD MDM: CPT

## 2018-07-07 RX ORDER — SODIUM CHLORIDE 9 MG/ML
125 INJECTION, SOLUTION INTRAVENOUS CONTINUOUS
Status: DISCONTINUED | OUTPATIENT
Start: 2018-07-07 | End: 2018-07-07

## 2018-07-07 RX ORDER — SODIUM CHLORIDE 9 MG/ML
1000 INJECTION, SOLUTION INTRAVENOUS ONCE
Status: COMPLETED | OUTPATIENT
Start: 2018-07-07 | End: 2018-07-07

## 2018-07-07 NOTE — ED PROVIDER NOTES
Patient Seen in: [facility] Emergency Department    History   Patient presents with:  Altered Mental Status (neurologic)    Stated Complaint:     HPI    This is an 27-year-old female with past medical history dementia, hallucinations, hypertension, appearing. SKIN: Normal, warm, and dry. HEENT:  Pupils equally round and reactive to light. Conjuctiva clear. Oropharynx is clear and moist.   Lungs: Clear to auscultation bilaterally with no rales, no retractions, and no wheezing.   HEART:  Regular rat it.  She was much more alert and perky after the IV fluids were given. Basic labs were obtained. BUN 23. Creatinine 1.3 which is slightly more her baseline. Most likely dehydration from the diarrhea.   Daughter is comfortable having her return to nursin

## 2018-07-09 LAB
ATRIAL RATE: 57 BPM
P AXIS: 83 DEGREES
P-R INTERVAL: 174 MS
Q-T INTERVAL: 452 MS
QRS DURATION: 80 MS
QTC CALCULATION (BEZET): 439 MS
R AXIS: 10 DEGREES
T AXIS: 80 DEGREES
VENTRICULAR RATE: 57 BPM

## 2018-08-17 ENCOUNTER — HOSPITAL ENCOUNTER (EMERGENCY)
Age: 83
Discharge: HOME | End: 2018-08-17
Attending: EMERGENCY MEDICINE
Payer: MEDICARE

## 2018-08-17 VITALS
WEIGHT: 110 LBS | OXYGEN SATURATION: 100 % | BODY MASS INDEX: 20.24 KG/M2 | SYSTOLIC BLOOD PRESSURE: 102 MMHG | HEART RATE: 80 BPM | TEMPERATURE: 99 F | DIASTOLIC BLOOD PRESSURE: 83 MMHG | HEIGHT: 62 IN | RESPIRATION RATE: 18 BRPM

## 2018-08-17 DIAGNOSIS — R53.1 WEAKNESS GENERALIZED: Primary | ICD-10-CM

## 2018-08-17 LAB
ALBUMIN SERPL-MCNC: 3.4 G/DL (ref 3.5–4.8)
ALBUMIN/GLOB SERPL: 1.1 {RATIO} (ref 1–2)
ALP LIVER SERPL-CCNC: 68 U/L (ref 55–142)
ALT SERPL-CCNC: 15 U/L (ref 14–54)
ANION GAP SERPL CALC-SCNC: 13 MMOL/L (ref 0–18)
AST SERPL-CCNC: 20 U/L (ref 15–41)
BASOPHILS # BLD AUTO: 0.06 X10(3) UL (ref 0–0.1)
BASOPHILS NFR BLD AUTO: 0.8 %
BILIRUB SERPL-MCNC: 0.3 MG/DL (ref 0.1–2)
BILIRUB UR QL STRIP.AUTO: NEGATIVE
BUN BLD-MCNC: 20 MG/DL (ref 8–20)
BUN/CREAT SERPL: 15.2 (ref 10–20)
CALCIUM BLD-MCNC: 8.6 MG/DL (ref 8.3–10.3)
CHLORIDE SERPL-SCNC: 104 MMOL/L (ref 101–111)
CLARITY UR REFRACT.AUTO: CLEAR
CO2 SERPL-SCNC: 23 MMOL/L (ref 22–32)
COLOR UR AUTO: YELLOW
CREAT BLD-MCNC: 1.32 MG/DL (ref 0.55–1.02)
EOSINOPHIL # BLD AUTO: 0.3 X10(3) UL (ref 0–0.3)
EOSINOPHIL NFR BLD AUTO: 4.2 %
ERYTHROCYTE [DISTWIDTH] IN BLOOD BY AUTOMATED COUNT: 12.9 % (ref 11.5–16)
GLOBULIN PLAS-MCNC: 3.1 G/DL (ref 2.5–4)
GLUCOSE BLD-MCNC: 143 MG/DL (ref 70–99)
GLUCOSE UR STRIP.AUTO-MCNC: NEGATIVE MG/DL
HCT VFR BLD AUTO: 38.2 % (ref 34–50)
HGB BLD-MCNC: 12 G/DL (ref 12–16)
IMMATURE GRANULOCYTE COUNT: 0.02 X10(3) UL (ref 0–1)
IMMATURE GRANULOCYTE RATIO %: 0.3 %
KETONES UR STRIP.AUTO-MCNC: NEGATIVE MG/DL
LEUKOCYTE ESTERASE UR QL STRIP.AUTO: NEGATIVE
LIPASE: 143 U/L (ref 73–393)
LYMPHOCYTES # BLD AUTO: 2.69 X10(3) UL (ref 0.9–4)
LYMPHOCYTES NFR BLD AUTO: 37.6 %
M PROTEIN MFR SERPL ELPH: 6.5 G/DL (ref 6.1–8.3)
MCH RBC QN AUTO: 30.3 PG (ref 27–33.2)
MCHC RBC AUTO-ENTMCNC: 31.4 G/DL (ref 31–37)
MCV RBC AUTO: 96.5 FL (ref 81–100)
MONOCYTES # BLD AUTO: 0.4 X10(3) UL (ref 0.1–1)
MONOCYTES NFR BLD AUTO: 5.6 %
NEUTROPHIL ABS PRELIM: 3.69 X10 (3) UL (ref 1.3–6.7)
NEUTROPHILS # BLD AUTO: 3.69 X10(3) UL (ref 1.3–6.7)
NEUTROPHILS NFR BLD AUTO: 51.5 %
NITRITE UR QL STRIP.AUTO: NEGATIVE
OSMOLALITY SERPL CALC.SUM OF ELEC: 295 MOSM/KG (ref 275–295)
PH UR STRIP.AUTO: 5.5 [PH] (ref 4.5–8)
PLATELET # BLD AUTO: 173 10(3)UL (ref 150–450)
POTASSIUM SERPL-SCNC: 4.5 MMOL/L (ref 3.6–5.1)
PROT UR STRIP.AUTO-MCNC: NEGATIVE MG/DL
RBC # BLD AUTO: 3.96 X10(6)UL (ref 3.8–5.1)
RED CELL DISTRIBUTION WIDTH-SD: 46.5 FL (ref 35.1–46.3)
SODIUM SERPL-SCNC: 140 MMOL/L (ref 136–144)
SP GR UR STRIP.AUTO: 1.01 (ref 1–1.03)
UROBILINOGEN UR STRIP.AUTO-MCNC: 0.2 MG/DL
WBC # BLD AUTO: 7.2 X10(3) UL (ref 4–13)

## 2018-08-17 PROCEDURE — 99284 EMERGENCY DEPT VISIT MOD MDM: CPT

## 2018-08-17 PROCEDURE — 85025 COMPLETE CBC W/AUTO DIFF WBC: CPT | Performed by: EMERGENCY MEDICINE

## 2018-08-17 PROCEDURE — 83690 ASSAY OF LIPASE: CPT | Performed by: EMERGENCY MEDICINE

## 2018-08-17 PROCEDURE — 96360 HYDRATION IV INFUSION INIT: CPT

## 2018-08-17 PROCEDURE — 80053 COMPREHEN METABOLIC PANEL: CPT | Performed by: EMERGENCY MEDICINE

## 2018-08-17 PROCEDURE — 81003 URINALYSIS AUTO W/O SCOPE: CPT | Performed by: EMERGENCY MEDICINE

## 2018-08-17 PROCEDURE — 96361 HYDRATE IV INFUSION ADD-ON: CPT

## 2018-08-17 NOTE — ED INITIAL ASSESSMENT (HPI)
Family states that they think patient having UTI. Patient has hx of UTI, treated for yeast infection 2 weeks ago. Patient on prophylactic keflex s/p UTI. Family has been trying to collect urine specimen since Tuesday without success.

## 2018-08-17 NOTE — ED PROVIDER NOTES
Patient Seen in: [facility] Emergency Department In [place]    History   Patient presents with:  Abdomen/Flank Pain (GI/)  Dehydration (metabolic/constitutional)    Stated Complaint: Abd pain- weakness    HPI    59-year-old female brought in by family f Pulse 80   Temp 98.6 °F (37 °C) (Temporal)   Resp 18   Ht 157.5 cm (5' 2\")   Wt 49.9 kg   SpO2 100%   BMI 20.12 kg/m²         Physical Exam    General:  Vitals as listed. No acute distress   HEENT: Sclerae anicteric. Conjunctivae show no pallor.   Arun Junes progress. Labs ordered. Family will not allow the patient to be catheterized as they states she does not tolerate the procedure well and would require sedation. We will attempt to sit her on the bedside commode to obtain a urine sample.     No evidence o

## 2018-08-17 NOTE — ED NOTES
Patient's family refusing to put patient in gown d/t dementia. Was able to remove patient's pants and place gown over shirt. Family refused to remove pull up.

## 2018-08-17 NOTE — ED INITIAL ASSESSMENT (HPI)
Per family pt has c/o abd pain and has not urinated much. Also c/o increased generalized weakness. Family sts \"we feel like she is dehydrated\". History of frequent UTI's. History of dementia.

## 2020-12-22 ENCOUNTER — APPOINTMENT (OUTPATIENT)
Dept: CT IMAGING | Facility: HOSPITAL | Age: 85
DRG: 177 | End: 2020-12-22
Attending: EMERGENCY MEDICINE
Payer: MEDICARE

## 2020-12-22 ENCOUNTER — HOSPITAL ENCOUNTER (INPATIENT)
Facility: HOSPITAL | Age: 85
LOS: 11 days | Discharge: NURSING, REHAB OR LONG-TERM CARE | DRG: 177 | End: 2021-01-02
Attending: EMERGENCY MEDICINE | Admitting: INTERNAL MEDICINE
Payer: MEDICARE

## 2020-12-22 ENCOUNTER — APPOINTMENT (OUTPATIENT)
Dept: GENERAL RADIOLOGY | Facility: HOSPITAL | Age: 85
DRG: 177 | End: 2020-12-22
Attending: EMERGENCY MEDICINE
Payer: MEDICARE

## 2020-12-22 DIAGNOSIS — E87.0 HYPERNATREMIA: ICD-10-CM

## 2020-12-22 DIAGNOSIS — E86.0 SEVERE DEHYDRATION: ICD-10-CM

## 2020-12-22 DIAGNOSIS — D64.9 ANEMIA, UNSPECIFIED TYPE: ICD-10-CM

## 2020-12-22 DIAGNOSIS — N17.9 ACUTE KIDNEY INJURY (HCC): ICD-10-CM

## 2020-12-22 DIAGNOSIS — N30.01 ACUTE CYSTITIS WITH HEMATURIA: Primary | ICD-10-CM

## 2020-12-22 DIAGNOSIS — R41.82 ALTERED MENTAL STATUS, UNSPECIFIED ALTERED MENTAL STATUS TYPE: ICD-10-CM

## 2020-12-22 PROBLEM — R73.9 HYPERGLYCEMIA: Status: ACTIVE | Noted: 2020-12-22

## 2020-12-22 PROBLEM — R79.89 AZOTEMIA: Status: ACTIVE | Noted: 2020-12-22

## 2020-12-22 LAB
ALBUMIN SERPL-MCNC: 2.8 G/DL (ref 3.4–5)
ALBUMIN/GLOB SERPL: 0.6 {RATIO} (ref 1–2)
ALP LIVER SERPL-CCNC: 59 U/L
ALT SERPL-CCNC: 28 U/L
ANION GAP SERPL CALC-SCNC: 6 MMOL/L (ref 0–18)
AST SERPL-CCNC: 51 U/L (ref 15–37)
BASOPHILS # BLD AUTO: 0.04 X10(3) UL (ref 0–0.2)
BASOPHILS NFR BLD AUTO: 0.5 %
BILIRUB SERPL-MCNC: 0.2 MG/DL (ref 0.1–2)
BILIRUB UR QL STRIP.AUTO: NEGATIVE
BUN BLD-MCNC: 92 MG/DL (ref 7–18)
BUN/CREAT SERPL: 23.7 (ref 10–20)
CALCIUM BLD-MCNC: 8.6 MG/DL (ref 8.5–10.1)
CHLORIDE SERPL-SCNC: 128 MMOL/L (ref 98–112)
CO2 SERPL-SCNC: 24 MMOL/L (ref 21–32)
CREAT BLD-MCNC: 3.88 MG/DL
DEPRECATED RDW RBC AUTO: 57.7 FL (ref 35.1–46.3)
EOSINOPHIL # BLD AUTO: 0 X10(3) UL (ref 0–0.7)
EOSINOPHIL NFR BLD AUTO: 0 %
ERYTHROCYTE [DISTWIDTH] IN BLOOD BY AUTOMATED COUNT: 15.9 % (ref 11–15)
GLOBULIN PLAS-MCNC: 4.5 G/DL (ref 2.8–4.4)
GLUCOSE BLD-MCNC: 144 MG/DL (ref 70–99)
GLUCOSE UR STRIP.AUTO-MCNC: 50 MG/DL
HCT VFR BLD AUTO: 26.9 %
HGB BLD-MCNC: 7.8 G/DL
IMM GRANULOCYTES # BLD AUTO: 0.04 X10(3) UL (ref 0–1)
IMM GRANULOCYTES NFR BLD: 0.5 %
KETONES UR STRIP.AUTO-MCNC: NEGATIVE MG/DL
LYMPHOCYTES # BLD AUTO: 1.73 X10(3) UL (ref 1–4)
LYMPHOCYTES NFR BLD AUTO: 22.1 %
M PROTEIN MFR SERPL ELPH: 7.3 G/DL (ref 6.4–8.2)
MCH RBC QN AUTO: 29.2 PG (ref 26–34)
MCHC RBC AUTO-ENTMCNC: 29 G/DL (ref 31–37)
MCV RBC AUTO: 100.7 FL
MONOCYTES # BLD AUTO: 0.38 X10(3) UL (ref 0.1–1)
MONOCYTES NFR BLD AUTO: 4.9 %
NEUTROPHILS # BLD AUTO: 5.64 X10 (3) UL (ref 1.5–7.7)
NEUTROPHILS # BLD AUTO: 5.64 X10(3) UL (ref 1.5–7.7)
NEUTROPHILS NFR BLD AUTO: 72 %
NITRITE UR QL STRIP.AUTO: NEGATIVE
OSMOLALITY SERPL CALC.SUM OF ELEC: 357 MOSM/KG (ref 275–295)
PH UR STRIP.AUTO: 8 [PH] (ref 4.5–8)
PLATELET # BLD AUTO: 257 10(3)UL (ref 150–450)
POTASSIUM SERPL-SCNC: 4.5 MMOL/L (ref 3.5–5.1)
PROT UR STRIP.AUTO-MCNC: 100 MG/DL
RBC # BLD AUTO: 2.67 X10(6)UL
RBC #/AREA URNS AUTO: >10 /HPF
SARS-COV-2 RNA RESP QL NAA+PROBE: NOT DETECTED
SODIUM SERPL-SCNC: 158 MMOL/L (ref 136–145)
SP GR UR STRIP.AUTO: 1.02 (ref 1–1.03)
UROBILINOGEN UR STRIP.AUTO-MCNC: <2 MG/DL
WBC # BLD AUTO: 7.8 X10(3) UL (ref 4–11)
WBC #/AREA URNS AUTO: >50 /HPF

## 2020-12-22 PROCEDURE — 71045 X-RAY EXAM CHEST 1 VIEW: CPT | Performed by: EMERGENCY MEDICINE

## 2020-12-22 PROCEDURE — 70450 CT HEAD/BRAIN W/O DYE: CPT | Performed by: EMERGENCY MEDICINE

## 2020-12-22 RX ORDER — FLUCONAZOLE 100 MG/1
150 TABLET ORAL WEEKLY
Status: ON HOLD | COMMUNITY
End: 2021-02-05

## 2020-12-22 RX ORDER — SULFAMETHOXAZOLE AND TRIMETHOPRIM 400; 80 MG/1; MG/1
1 TABLET ORAL EVERY OTHER DAY
Status: ON HOLD | COMMUNITY
End: 2021-01-02

## 2020-12-22 RX ORDER — CEPHALEXIN 250 MG/1
250 CAPSULE ORAL DAILY
Status: ON HOLD | COMMUNITY
Start: 2020-11-25 | End: 2021-01-02

## 2020-12-22 RX ORDER — CLONAZEPAM 0.5 MG/1
0.25 TABLET ORAL 2 TIMES DAILY
Status: ON HOLD | COMMUNITY
End: 2021-02-05

## 2020-12-22 RX ORDER — SODIUM CHLORIDE, SODIUM LACTATE, POTASSIUM CHLORIDE, CALCIUM CHLORIDE 600; 310; 30; 20 MG/100ML; MG/100ML; MG/100ML; MG/100ML
INJECTION, SOLUTION INTRAVENOUS ONCE
Status: COMPLETED | OUTPATIENT
Start: 2020-12-22 | End: 2020-12-22

## 2020-12-22 RX ORDER — ESCITALOPRAM OXALATE 10 MG/1
10 TABLET ORAL DAILY
COMMUNITY

## 2020-12-22 NOTE — ED NOTES
Pt daughter Myesha Larkin and son Arlene Cavazos called, this RN spoke to both individually. States pt was sent here by mistake, does not want pt here but at [address] where all of her care is. Family made aware pt cannot be transferred over there. daughter states she want

## 2020-12-22 NOTE — ED INITIAL ASSESSMENT (HPI)
Pt presents to ED via [facility].  Nursing staff reports pt becoming lethargic while eating food and falling asleep with food in her mouth, also reports hgb of 7.1

## 2020-12-23 LAB
ANION GAP SERPL CALC-SCNC: 4 MMOL/L (ref 0–18)
ANION GAP SERPL CALC-SCNC: 7 MMOL/L (ref 0–18)
ANTIBODY SCREEN: NEGATIVE
BUN BLD-MCNC: 70 MG/DL (ref 7–18)
BUN BLD-MCNC: 77 MG/DL (ref 7–18)
BUN/CREAT SERPL: 26.1 (ref 10–20)
BUN/CREAT SERPL: 26.4 (ref 10–20)
CALCIUM BLD-MCNC: 8.3 MG/DL (ref 8.5–10.1)
CALCIUM BLD-MCNC: 8.4 MG/DL (ref 8.5–10.1)
CHLORIDE SERPL-SCNC: 128 MMOL/L (ref 98–112)
CHLORIDE SERPL-SCNC: 130 MMOL/L (ref 98–112)
CO2 SERPL-SCNC: 21 MMOL/L (ref 21–32)
CO2 SERPL-SCNC: 25 MMOL/L (ref 21–32)
CREAT BLD-MCNC: 2.68 MG/DL
CREAT BLD-MCNC: 2.92 MG/DL
DEPRECATED RDW RBC AUTO: 57.8 FL (ref 35.1–46.3)
DEPRECATED RDW RBC AUTO: 58.2 FL (ref 35.1–46.3)
ERYTHROCYTE [DISTWIDTH] IN BLOOD BY AUTOMATED COUNT: 15.7 % (ref 11–15)
ERYTHROCYTE [DISTWIDTH] IN BLOOD BY AUTOMATED COUNT: 16.7 % (ref 11–15)
GLUCOSE BLD-MCNC: 104 MG/DL (ref 70–99)
GLUCOSE BLD-MCNC: 119 MG/DL (ref 70–99)
HCT VFR BLD AUTO: 20.4 %
HCT VFR BLD AUTO: 30.1 %
HGB BLD-MCNC: 5.8 G/DL
HGB BLD-MCNC: 9.1 G/DL
MCH RBC QN AUTO: 28.7 PG (ref 26–34)
MCH RBC QN AUTO: 29 PG (ref 26–34)
MCHC RBC AUTO-ENTMCNC: 28.4 G/DL (ref 31–37)
MCHC RBC AUTO-ENTMCNC: 30.2 G/DL (ref 31–37)
MCV RBC AUTO: 101 FL
MCV RBC AUTO: 95.9 FL
OSMOLALITY SERPL CALC.SUM OF ELEC: 344 MOSM/KG (ref 275–295)
OSMOLALITY SERPL CALC.SUM OF ELEC: 351 MOSM/KG (ref 275–295)
PLATELET # BLD AUTO: 189 10(3)UL (ref 150–450)
PLATELET # BLD AUTO: 192 10(3)UL (ref 150–450)
POTASSIUM SERPL-SCNC: 4.3 MMOL/L (ref 3.5–5.1)
POTASSIUM SERPL-SCNC: 4.3 MMOL/L (ref 3.5–5.1)
RBC # BLD AUTO: 2.02 X10(6)UL
RBC # BLD AUTO: 3.14 X10(6)UL
RH BLOOD TYPE: POSITIVE
SODIUM SERPL-SCNC: 156 MMOL/L (ref 136–145)
SODIUM SERPL-SCNC: 159 MMOL/L (ref 136–145)
WBC # BLD AUTO: 6.8 X10(3) UL (ref 4–11)
WBC # BLD AUTO: 7 X10(3) UL (ref 4–11)

## 2020-12-23 PROCEDURE — 30233N1 TRANSFUSION OF NONAUTOLOGOUS RED BLOOD CELLS INTO PERIPHERAL VEIN, PERCUTANEOUS APPROACH: ICD-10-PCS | Performed by: INTERNAL MEDICINE

## 2020-12-23 PROCEDURE — 99223 1ST HOSP IP/OBS HIGH 75: CPT | Performed by: HOSPITALIST

## 2020-12-23 RX ORDER — LEVOTHYROXINE SODIUM 0.05 MG/1
50 TABLET ORAL
Status: DISCONTINUED | OUTPATIENT
Start: 2020-12-23 | End: 2021-01-02

## 2020-12-23 RX ORDER — CLONAZEPAM 0.5 MG/1
0.25 TABLET ORAL 2 TIMES DAILY PRN
Status: DISCONTINUED | OUTPATIENT
Start: 2020-12-23 | End: 2021-01-02

## 2020-12-23 RX ORDER — ATORVASTATIN CALCIUM 20 MG/1
20 TABLET, FILM COATED ORAL NIGHTLY
Status: DISCONTINUED | OUTPATIENT
Start: 2020-12-23 | End: 2021-01-02

## 2020-12-23 RX ORDER — DONEPEZIL HYDROCHLORIDE 10 MG/1
10 TABLET, FILM COATED ORAL NIGHTLY
Status: DISCONTINUED | OUTPATIENT
Start: 2020-12-23 | End: 2020-12-24

## 2020-12-23 RX ORDER — SODIUM CHLORIDE 9 MG/ML
INJECTION, SOLUTION INTRAVENOUS ONCE
Status: COMPLETED | OUTPATIENT
Start: 2020-12-23 | End: 2020-12-23

## 2020-12-23 RX ORDER — MEMANTINE HYDROCHLORIDE 10 MG/1
10 TABLET ORAL 2 TIMES DAILY
Status: DISCONTINUED | OUTPATIENT
Start: 2020-12-23 | End: 2021-01-02

## 2020-12-23 RX ORDER — SODIUM CHLORIDE 9 MG/ML
INJECTION, SOLUTION INTRAVENOUS CONTINUOUS
Status: DISCONTINUED | OUTPATIENT
Start: 2020-12-23 | End: 2020-12-23

## 2020-12-23 RX ORDER — SODIUM CHLORIDE 450 MG/100ML
INJECTION, SOLUTION INTRAVENOUS CONTINUOUS
Status: DISCONTINUED | OUTPATIENT
Start: 2020-12-23 | End: 2021-01-02

## 2020-12-23 RX ORDER — ESCITALOPRAM OXALATE 10 MG/1
10 TABLET ORAL DAILY
Status: DISCONTINUED | OUTPATIENT
Start: 2020-12-23 | End: 2021-01-02

## 2020-12-23 NOTE — PLAN OF CARE
Received call from daughter Shine Peterson [employer]). Password set up. Updated on patient.  Per daughter, pt has a stent that was placed for a clot in the right calf and ever since, pt has had some vaginal bleeding or blood in urine and that is why pt's urine is dark co

## 2020-12-23 NOTE — H&P
[facility]  History and Physical     Elena De Jesus Patient Status:  Inpatient    1935 MRN [id number]   [facility] 3NE-A Attending Franklin Reyes MD   Hosp Day # 1 PCP Jayna Coats MD     Chief Complaint: confusion    Hist Rfl:     •  escitalopram 10 MG Oral Tab, Take 10 mg by mouth daily. , Disp: , Rfl:     •  Rivaroxaban (XARELTO) 20 MG Oral Tab, Take 20 mg by mouth daily with food. , Disp: , Rfl:     •  clonazePAM 0.5 MG Oral Tab, Take 0.25 mg by mouth 2 (two) times daily a 10*   CA 8.6   ALB 2.8*   *   K 4.5   *   CO2 24.0   ALKPHO 59   AST 51*   ALT 28   BILT 0.2   TP 7.3       Estimated Creatinine Clearance: 8.4 mL/min (A) (based on SCr of 3.88 mg/dL (H)).     No results for input(s): PTP, INR in the last 168 ho Hospitalization - Initial Certification    Patient will require inpatient services that will reasonably be expected to span two midnight's based on the clinical documentation in H+P.    Based on patients current state of illness, I anticipate that, after [facility]

## 2020-12-23 NOTE — PLAN OF CARE
HGB 5.8. PRBC ordered. Pt unable to consent. Phone call placed to pt's daughter for blood consent. Daughter Benny Dumont asking to have MD here speak with pt's MD Juliana Aparicio. Page to Dr Jason Hubbard to notify.   Daughter states she will discuss with her bother and bell

## 2020-12-23 NOTE — ED PROVIDER NOTES
Patient Seen in: [facility] Emergency Department      History   Patient presents with:  Altered Mental Status  Abnormal Labs    Stated Complaint: ams    HPI  This is an 27-year-old woman, history of dementia, hypertension, here for evaluation of alt wheezing, rhonchi or rales. Abdominal: Soft nontender nondistended, normal bowel sounds, no guarding no rebound tenderness  Skin: Warm and dry  Neurological: Patient wakes to voice, minimally responsive.   There is spontaneous movement of all extremities DRAW BLUE   RAINBOW DRAW LAVENDER   RAINBOW DRAW LIGHT GREEN   RAINBOW DRAW GOLD   URINE CULTURE, ROUTINE   BLOOD CULTURE   BLOOD CULTURE          Ct Brain Or Head (93322)    Result Date: 12/22/2020  PROCEDURE:  CT BRAIN OR HEAD (52012)  COMPARISON:  JENNI FINDINGS: Cardiac silhouette and pulmonary vasculature are unremarkable. No consolidation, pleural effusion or pneumothorax. IMPRESSION: Unremarkable portable chest radiograph.    Dictated by (CST): Magdi Rai MD on 12/22/2020 at 6:10 PM     Jocelin Hill

## 2020-12-23 NOTE — PROGRESS NOTES
[facility] Pharmacy Note:  Renal Adjustment for piperacillin/tazobactam (Davina Lopez)    Deloris Millan is a 80year old patient who has been prescribed piperacillin/tazobactam (ZOSYN) 3.375 gm IV every 8 hrs.   CrCl is estimated creatinine clearance is 8.4 mL/min (A)

## 2020-12-23 NOTE — SLP NOTE
ADULT SWALLOWING EVALUATION    ASSESSMENT    ASSESSMENT/OVERALL IMPRESSION:  Orders were received for a bedside swallowing evaluation as patient admitted from SNF on a modified diet although diet is unknown.  This is an 27-year-old woman, history of dementi Liquid: Thin      Compensatory Strategies Recommended: No straws; Liquids via spoon; Slow rate  Aspiration Precautions: Upright position; Slow rate;Small bites and sips; No straw(Teaspoon presentations. )  Medication Administration Recommendations: Crushed in of Presentation: Staff/Clinician assistance;Spoon;Single sips  Patient Positioning: Upright;Midline    Oral Phase of Swallow: Impaired  Bolus Retrieval: Impaired  Bilabial Seal: Intact  Bolus Formation: Intact  Bolus Propulsion: Impaired  Mastication: [place]

## 2020-12-23 NOTE — CM/SW NOTE
Spoke to daughter Sameera Barber) I informed her that her mom is not stable for a transfer to another hospital per my ER physician. She understands this.  She stated that her mom has always gone to [facility] in the past.

## 2020-12-23 NOTE — DIETARY NOTE
[facility]    NUTRITION ASSESSMENT    Pt does not meet malnutrition criteria.     NUTRITION DIAGNOSIS/PROBLEM:    Increased nutrient needs related to increased demands of healing as evidenced by multiple wounds including stage II PU to sacrum     NUTRI FINDINGS:     1. Body Fat/Muscle Mass: AURORA per visual exam.     2. Fluid Accumulation: none per RN documentation per visual exam.    NUTRITION PRESCRIPTION:  Calories: 5706-3657 calories/day (30-35 calories per kg)  Protein: 60-75 grams protein/day (1.2-1.

## 2020-12-23 NOTE — PLAN OF CARE
Confused. Will not open eyes. Yells at times to [employer]". Pt talks and sings at times. Doesn't answer questions or follow most commands. Will open mouth for bite of food. Irritable at times. Contracted. Braces to arms. Tolerating RA.  No respiratory dist osmolarity and serum sodium as indicated or ordered  - Monitor response to interventions for patient's volume status, including labs, urine output, blood pressure (other measures as available)  - Encourage oral intake as appropriate  - Instruct patient on

## 2020-12-23 NOTE — PLAN OF CARE
NURSING ADMISSION NOTE      Patient admitted via Cart  Oriented to room. Safety precautions initiated. Bed in low position. Call light in reach.   Navigator completed to best ability

## 2020-12-23 NOTE — CM/SW NOTE
12/23/20 1300   CM/SW Referral Data   Referral Source Social Work (self-referral)   Reason for Referral Discharge planning   Informant Children   Social History   Recreational Drug/Alcohol Use n   Major Changes Last 6 Months n   Domestic/Partner Violenc Alternatives Discussed Intro (Do Not Add Period): I discussed alternative treatments to Mohs surgery and specifically discussed the risks and benefits of

## 2020-12-23 NOTE — PLAN OF CARE
Pt. Disoriented x4- will only scream and yell. Will not answer orientation questions. Very irritable and wants to be left alone. RA;VSS  LR @ 125 (standing ER orders)  Mx wounds noted; pictures taken and redressed   Pt.  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 Ludin Hutchinson). MD eckert. Spoke with dr Valentina Felix and she explained that even though this pt has been seen by [facility] urology, this is not a [facility] patient. 1705- Dr. Laxmi Castle gave admitting orders.        Problem: Delirium  Goal: Minimize duration of deliri

## 2020-12-24 ENCOUNTER — APPOINTMENT (OUTPATIENT)
Dept: CT IMAGING | Facility: HOSPITAL | Age: 85
DRG: 177 | End: 2020-12-24
Attending: INTERNAL MEDICINE
Payer: MEDICARE

## 2020-12-24 LAB
ANION GAP SERPL CALC-SCNC: 7 MMOL/L (ref 0–18)
BASOPHILS # BLD AUTO: 0.02 X10(3) UL (ref 0–0.2)
BASOPHILS NFR BLD AUTO: 0.3 %
BLOOD TYPE BARCODE: 6200
BUN BLD-MCNC: 62 MG/DL (ref 7–18)
BUN/CREAT SERPL: 26.2 (ref 10–20)
CALCIUM BLD-MCNC: 8.2 MG/DL (ref 8.5–10.1)
CHLORIDE SERPL-SCNC: 127 MMOL/L (ref 98–112)
CO2 SERPL-SCNC: 19 MMOL/L (ref 21–32)
CREAT BLD-MCNC: 2.37 MG/DL
DEPRECATED RDW RBC AUTO: 58.9 FL (ref 35.1–46.3)
EOSINOPHIL # BLD AUTO: 0 X10(3) UL (ref 0–0.7)
EOSINOPHIL NFR BLD AUTO: 0 %
ERYTHROCYTE [DISTWIDTH] IN BLOOD BY AUTOMATED COUNT: 16.4 % (ref 11–15)
GLUCOSE BLD-MCNC: 127 MG/DL (ref 70–99)
HCT VFR BLD AUTO: 32.2 %
HGB BLD-MCNC: 9.7 G/DL
IMM GRANULOCYTES # BLD AUTO: 0.06 X10(3) UL (ref 0–1)
IMM GRANULOCYTES NFR BLD: 0.9 %
LYMPHOCYTES # BLD AUTO: 1.22 X10(3) UL (ref 1–4)
LYMPHOCYTES NFR BLD AUTO: 18.2 %
MCH RBC QN AUTO: 29.6 PG (ref 26–34)
MCHC RBC AUTO-ENTMCNC: 30.1 G/DL (ref 31–37)
MCV RBC AUTO: 98.2 FL
MONOCYTES # BLD AUTO: 0.21 X10(3) UL (ref 0.1–1)
MONOCYTES NFR BLD AUTO: 3.1 %
NEUTROPHILS # BLD AUTO: 5.21 X10 (3) UL (ref 1.5–7.7)
NEUTROPHILS # BLD AUTO: 5.21 X10(3) UL (ref 1.5–7.7)
NEUTROPHILS NFR BLD AUTO: 77.5 %
OSMOLALITY SERPL CALC.SUM OF ELEC: 335 MOSM/KG (ref 275–295)
PLATELET # BLD AUTO: 166 10(3)UL (ref 150–450)
POTASSIUM SERPL-SCNC: 3.9 MMOL/L (ref 3.5–5.1)
RBC # BLD AUTO: 3.28 X10(6)UL
SODIUM SERPL-SCNC: 153 MMOL/L (ref 136–145)
WBC # BLD AUTO: 6.7 X10(3) UL (ref 4–11)

## 2020-12-24 PROCEDURE — 74176 CT ABD & PELVIS W/O CONTRAST: CPT | Performed by: INTERNAL MEDICINE

## 2020-12-24 PROCEDURE — 99233 SBSQ HOSP IP/OBS HIGH 50: CPT | Performed by: HOSPITALIST

## 2020-12-24 RX ORDER — ACETAMINOPHEN 650 MG/1
650 SUPPOSITORY RECTAL EVERY 6 HOURS PRN
Status: DISCONTINUED | OUTPATIENT
Start: 2020-12-24 | End: 2021-01-02

## 2020-12-24 RX ORDER — ACETAMINOPHEN 500 MG
500 TABLET ORAL EVERY 6 HOURS PRN
Status: DISCONTINUED | OUTPATIENT
Start: 2020-12-24 | End: 2021-01-02

## 2020-12-24 NOTE — PROGRESS NOTES
[facility]  Progress Note     Abraham Dunaway Patient Status:  Inpatient    1935 MRN [id number]   [facility] 3NE-A Attending Jaswinder Cortez MD   Hosp Day # 2 PCP Eloisa Crabtree MD     Chief Complaint: confusion    S: Patient feel the last 168 hours. Imaging: Imaging data reviewed in Epic.     Medications:   • atorvastatin  20 mg Oral Nightly   • Donepezil HCl  10 mg Oral Nightly   • escitalopram  10 mg Oral Daily   • Levothyroxine Sodium  50 mcg Oral Before breakfast   • Mem

## 2020-12-24 NOTE — PLAN OF CARE
Patient is alert but disoriented x4. Irritable. Yells out at times. Febrile at beginning of shift, ice packs placed on recheck pt was afebrile. VSS. On tele-NSR. On RA. IV-0.45%  ml/hr. IV abx. Contracted to all four extremities.    Took medi patient weight  - Monitor urine specific gravity, serum osmolarity and serum sodium as indicated or ordered  - Monitor response to interventions for patient's volume status, including labs, urine output, blood pressure (other measures as available)  Colleen Bradley

## 2020-12-24 NOTE — CDS QUERY
Uncertain Diagnosis  CLINICAL DOCUMENTATION CLARIFICATION FORM  Clinical information (provided below) indicates an unknown status of a documented diagnosis.  For accurate ICD-10-CM code assignment to reflect severity of illness and risk of mortality,   REINA

## 2020-12-24 NOTE — PHYSICAL THERAPY NOTE
PT orders received; chart reviewed. Pt. Baseline PTA was limited to bed, unable to ambulate, and PT is not warranted at this time . Spoke with RN who agreed with decision.

## 2020-12-24 NOTE — PLAN OF CARE
Patient drowsy and sleeping this AM. IV antibiotics infusing. Wound care consult evaluated patient today. IVF infusing, rate adjusted per MD. Elvira Jean with daughter today. All needs met at this time, will continue to monitor.

## 2020-12-24 NOTE — CONSULTS
[facility]  Report of Inpatient Wound Care Consultation     Lori Martinez Patient Status:  Inpatient    1935 MRN [id number]   [facility] 3NE-A Attending Madan Lundy MD   Hosp Day # 2 PCP Rosibel Thomas MD     [address] the computer for daily. Durable Medical Equipment:  NA  Offloading/Footwear:  B heel offloading boots  Compression:  NA    Physical Therapy Wound Goals:  1. Maintain optimal wound healing environment  2. Remove wound bioburden  3.  Decrease periwound ed

## 2020-12-25 PROCEDURE — 99233 SBSQ HOSP IP/OBS HIGH 50: CPT | Performed by: HOSPITALIST

## 2020-12-25 RX ORDER — ENOXAPARIN SODIUM 100 MG/ML
30 INJECTION SUBCUTANEOUS DAILY
Status: DISCONTINUED | OUTPATIENT
Start: 2020-12-25 | End: 2021-01-01

## 2020-12-25 NOTE — PLAN OF CARE
Patient is drowsy and oriented to self   No n/v/d  Irritable at times - would tell staff to leave her alone during pericare  Wounds noted - wound care is on  Febrile at the start of the shift - tylenol suppository and ice packs given - Afebrile since  IVF nutrition restrictions as appropriate  Outcome: Progressing     Problem: SKIN/TISSUE INTEGRITY - ADULT  Goal: Incision(s), wounds(s) or drain site(s) healing without S/S of infection  Description: INTERVENTIONS:  - Assess and document risk factors for pres

## 2020-12-25 NOTE — PROGRESS NOTES
[facility]  Progress Note     Venson Code Patient Status:  Inpatient    1935 MRN [id number]   [facility] 3NE-A Attending Summer Daniel MD   Hosp Day # 3 PCP Dimas Peralta MD     Chief Complaint: confusion    S: Patient conf Imaging data reviewed in Epic.     Medications:   • enoxaparin  40 mg Subcutaneous Daily   • atorvastatin  20 mg Oral Nightly   • escitalopram  10 mg Oral Daily   • Levothyroxine Sodium  50 mcg Oral Before breakfast   • Memantine HCl  10 mg Oral BID   • pip

## 2020-12-25 NOTE — PLAN OF CARE
Assumed patient care at 7:30   Contact and droplet for being from nursing home  A/O x1   Room air  NSR on tele   Briefed   CNPI noted   Bed wong at baseline   Zosyn Q8   0.45 infusing at 100  All safety precautions are in place and will continue to monitor

## 2020-12-26 ENCOUNTER — APPOINTMENT (OUTPATIENT)
Dept: ULTRASOUND IMAGING | Facility: HOSPITAL | Age: 85
DRG: 177 | End: 2020-12-26
Attending: HOSPITALIST
Payer: MEDICARE

## 2020-12-26 LAB
ANION GAP SERPL CALC-SCNC: 7 MMOL/L (ref 0–18)
BUN BLD-MCNC: 43 MG/DL (ref 7–18)
BUN/CREAT SERPL: 25.7 (ref 10–20)
CALCIUM BLD-MCNC: 7.4 MG/DL (ref 8.5–10.1)
CHLORIDE SERPL-SCNC: 119 MMOL/L (ref 98–112)
CO2 SERPL-SCNC: 19 MMOL/L (ref 21–32)
CREAT BLD-MCNC: 1.67 MG/DL
GLUCOSE BLD-MCNC: 100 MG/DL (ref 70–99)
OSMOLALITY SERPL CALC.SUM OF ELEC: 311 MOSM/KG (ref 275–295)
POTASSIUM SERPL-SCNC: 3.1 MMOL/L (ref 3.5–5.1)
POTASSIUM SERPL-SCNC: 4.5 MMOL/L (ref 3.5–5.1)
SODIUM SERPL-SCNC: 145 MMOL/L (ref 136–145)

## 2020-12-26 PROCEDURE — 76770 US EXAM ABDO BACK WALL COMP: CPT | Performed by: HOSPITALIST

## 2020-12-26 PROCEDURE — 99232 SBSQ HOSP IP/OBS MODERATE 35: CPT | Performed by: HOSPITALIST

## 2020-12-26 RX ORDER — POTASSIUM CHLORIDE 20 MEQ/1
40 TABLET, EXTENDED RELEASE ORAL EVERY 4 HOURS
Status: COMPLETED | OUTPATIENT
Start: 2020-12-26 | End: 2020-12-26

## 2020-12-26 RX ORDER — POTASSIUM CHLORIDE 20 MEQ/1
40 TABLET, EXTENDED RELEASE ORAL EVERY 4 HOURS
Status: DISCONTINUED | OUTPATIENT
Start: 2020-12-26 | End: 2020-12-26

## 2020-12-26 NOTE — PROGRESS NOTES
[facility]  Progress Note     Janae Blankenship Patient Status:  Inpatient    1935 MRN [id number]   [facility] 3NE-A Attending Monika Hopper MD   Hosp Day # 4 PCP Juliana Aparicio MD     Chief Complaint: confusion    S: Patient giorgi input(s): PTP, INR in the last 168 hours. No results for input(s): TROP, CK in the last 168 hours. Imaging: Imaging data reviewed in Epic.     Medications:   • enoxaparin  30 mg Subcutaneous Daily   • atorvastatin  20 mg Oral Nightly   • escitalo

## 2020-12-26 NOTE — PLAN OF CARE
Pt is alert but disoriented x4 and can be irritable towards staff while providing care. Pt does not answer most questions and yells \"get out\". Unable to follow commands but will open mouth for bites of food. VSS- NSR on tele. SpO2 stable on RA.  Lung soun GENITOURINARY - ADULT  Goal: Absence of urinary retention  Description: INTERVENTIONS:  - Assess patient’s ability to void and empty bladder  - Monitor intake/output and perform bladder scan as needed  - Follow urinary retention protocol/standard of care bleeding or hemorrhage  - Monitor labs and vital signs for trends  - Administer supportive blood products/factors, fluids and medications as ordered and appropriate  - Administer supportive blood products/factors as ordered and appropriate  Outcome: Silvestre

## 2020-12-27 ENCOUNTER — APPOINTMENT (OUTPATIENT)
Dept: GENERAL RADIOLOGY | Facility: HOSPITAL | Age: 85
DRG: 177 | End: 2020-12-27
Attending: UROLOGY
Payer: MEDICARE

## 2020-12-27 LAB
CRP SERPL-MCNC: 7.8 MG/DL (ref ?–0.3)
SARS-COV-2 RNA RESP QL NAA+PROBE: DETECTED

## 2020-12-27 PROCEDURE — 99232 SBSQ HOSP IP/OBS MODERATE 35: CPT | Performed by: HOSPITALIST

## 2020-12-27 PROCEDURE — 74018 RADEX ABDOMEN 1 VIEW: CPT | Performed by: UROLOGY

## 2020-12-27 NOTE — PROGRESS NOTES
Pt. Is a&o x0 this am. Alert and awake - responsive to name. NSR on tele. Unable to follow commands but is responsive to food being placed in front of her. IVF continued. IV antibiotics continued. K replaced today - lab draw in am. Dressings c/d/i.  Iso pre

## 2020-12-27 NOTE — CONSULTS
[facility]  Report of Consultation    Edson Galeana Patient Status:  Inpatient    1935 MRN [id number]   [facility] 3NE-A Attending Qiana Montero MD   Hosp Day # 4 PCP Vipul Ashby MD     Reason for Consultation:    RECURENT UTI ADD-vantage, 3.375 g, Intravenous, Q12H  •  0.45% NaCl infusion, , Intravenous, Continuous    Review of Systems:  A comprehensive review of systems was negative except for: Genitourinary: positive for frequency and urinary incontinence    Physical Exam:  B 8.0 8.0    Protein Urine   Negative mg/dl 100 Abnormal     Urobilinogen Urine   0.2 - 2.0 mg/dL <2.0    Nitrite Urine   Negative Negative    Leukocyte Esterase Urine   Negative ModerateAbnormal     WBC Urine   <5 /HPF >50Abnormal     RBC URINE   0 - 2 /HPF Hypokalemia     Transient cerebral ischemia     Transient cerebral ischemia, unspecified type     Hypernatremia     Anemia     Azotemia     Hyperglycemia     Acute cystitis with hematuria     Acute kidney injury ([facility] [address]     Severe dehydration      Recurrent

## 2020-12-27 NOTE — PLAN OF CARE
Received patient at 1100 from 65. Transferred due to positive Covid result. Pt drowsy, opens eyes and knows name. Took pills crushed with applesauce and fell right back to sleep.  Called to clarify plan for urethral stent exchange, Dr Wilfred Parham called and zeynep protocol/standard of care  - Consider collaborating with pharmacy to review patient's medication profile  - Implement strategies to promote bladder emptying  Outcome: Progressing     Problem: RISK FOR INFECTION - ADULT  Goal: Absence of fever/infection dur appropriate  Outcome: Progressing

## 2020-12-27 NOTE — PROGRESS NOTES
[facility]  Progress Note    Elena De Jesus Patient Status:  Inpatient    1935 MRN [id number]   [facility] 5NW-A Attending Nahomy Mittal MD   [address] Day # 5 PCP Jayna Coats MD     Subjective:  Eelna De Jesus is a(n) 80year old sign off for now       Ventura Cogan  12/27/2020  4:33 PM

## 2020-12-27 NOTE — PROGRESS NOTES
[facility]  Progress Note     Selena Peter Patient Status:  Inpatient    1935 MRN [id number]   [facility] 3NE-A Attending Carolyn Rosenthal MD   Hosp Day # 5 PCP Camila Valladares MD     Chief Complaint: confusion    S: Patient giorgi Creatinine Clearance: 19.4 mL/min (A) (based on SCr of 1.67 mg/dL (H)). No results for input(s): PTP, INR in the last 168 hours. No results for input(s): TROP, CK in the last 168 hours. Imaging: Imaging data reviewed in Epic.     Medications:

## 2020-12-27 NOTE — PLAN OF CARE
Problem: Patient/Family Goals  Pt is aox0. Will yell out when being moved or turned telling us to leave her alone and to stop what we are doing. Pt on RA. Tele with NSR.  Medicated per orders - meds taken crushed in applesauce and taken without difficult for signs and symptoms of volume excess or deficit  - Monitor intake, output and patient weight  - Monitor urine specific gravity, serum osmolarity and serum sodium as indicated or ordered  - Monitor response to interventions for patient's volume status, i

## 2020-12-27 NOTE — CONSULTS
INFECTIOUS DISEASE CONSULTATION    [facility] Patient Status:  Inpatient    1935 MRN [id number]   [facility] 3NE-A Attending Billy Corrales MD   Hosp Day # 5 PCP Meliza Acuña MD Daily  •  Levothyroxine Sodium (SYNTHROID) tab 50 mcg, 50 mcg, Oral, Before breakfast  •  Memantine HCl (NAMENDA) tab 10 mg, 10 mg, Oral, BID  •  Piperacillin Sod-Tazobactam So (ZOSYN) 3.375 g in dextrose 5 % 100 mL ADD-vantage, 3.375 g, Intravenous, Q12H S2.  Regular rate and rhythm. No murmurs. Abdomen: Soft, nontender, nondistended. Positive bowel sounds. Musculoskeletal: Full range of motion of all extremities. No swelling noted. Joints: le contracted.          Laboratory Data:  Laboratory data rev hypertension, benign     Pernicious anemia     Unspecified hypothyroidism     Headache(784.0)     Insomnia     Edema leg     Cognitive impairment     Dementia, in, senility, with behavioral disturbance ([facility] [address]     Hypothyroidism in adult     Cellulitis and a

## 2020-12-28 LAB
ALT SERPL-CCNC: 17 U/L
ANION GAP SERPL CALC-SCNC: 5 MMOL/L (ref 0–18)
BILIRUB UR QL STRIP.AUTO: NEGATIVE
BUN BLD-MCNC: 23 MG/DL (ref 7–18)
BUN/CREAT SERPL: 17.2 (ref 10–20)
CALCIUM BLD-MCNC: 7.4 MG/DL (ref 8.5–10.1)
CHLORIDE SERPL-SCNC: 117 MMOL/L (ref 98–112)
CLARITY UR REFRACT.AUTO: CLEAR
CO2 SERPL-SCNC: 19 MMOL/L (ref 21–32)
CREAT BLD-MCNC: 1.34 MG/DL
CRP SERPL-MCNC: 7.6 MG/DL (ref ?–0.3)
D-DIMER: 2.14 UG/ML FEU (ref ?–0.85)
DEPRECATED HBV CORE AB SER IA-ACNC: 349.2 NG/ML
GLUCOSE BLD-MCNC: 91 MG/DL (ref 70–99)
GLUCOSE UR STRIP.AUTO-MCNC: NEGATIVE MG/DL
KETONES UR STRIP.AUTO-MCNC: NEGATIVE MG/DL
NITRITE UR QL STRIP.AUTO: NEGATIVE
OSMOLALITY SERPL CALC.SUM OF ELEC: 295 MOSM/KG (ref 275–295)
PH UR STRIP.AUTO: 7 [PH] (ref 4.5–8)
POTASSIUM SERPL-SCNC: 3.5 MMOL/L (ref 3.5–5.1)
PROT UR STRIP.AUTO-MCNC: NEGATIVE MG/DL
SODIUM SERPL-SCNC: 141 MMOL/L (ref 136–145)
SP GR UR STRIP.AUTO: <1.005 (ref 1–1.03)
UROBILINOGEN UR STRIP.AUTO-MCNC: <2 MG/DL

## 2020-12-28 PROCEDURE — XW033E5 INTRODUCTION OF REMDESIVIR ANTI-INFECTIVE INTO PERIPHERAL VEIN, PERCUTANEOUS APPROACH, NEW TECHNOLOGY GROUP 5: ICD-10-PCS | Performed by: INTERNAL MEDICINE

## 2020-12-28 PROCEDURE — 99232 SBSQ HOSP IP/OBS MODERATE 35: CPT | Performed by: HOSPITALIST

## 2020-12-28 NOTE — PLAN OF CARE
COVID-19 Daily Discharge Readiness-Nursing    O2 Sat at Rest:   95-98  % RA  O2 Sat with Exertion:    % on    liters   Temperature max from last 24 hrs: Temp (24hrs), Av.8 °F (37.1 °C), Min:96.9 °F (36.1 °C), Max:101.8 °F (38.8 °C)    Inflammatory Nikita Elma highest level of mobility daily  - Provide frequent reorientation  - Promote wakefulness i.e. lights on, blinds open  - Promote sleep, encourage patient's normal rest cycle i.e. lights off, TV off, minimize noise and interruptions  - Encourage family to as hematologic stability  Description: INTERVENTIONS  - Assess for signs and symptoms of bleeding or hemorrhage  - Monitor labs and vital signs for trends  - Administer supportive blood products/factors, fluids and medications as ordered and appropriate  - Ad

## 2020-12-28 NOTE — CONSULTS
Palliative care team consulted for assistance with goals of care discussion. I spoke with pt's dtr Gaetana Peabody on phone. She politely declined to speak with Palliative care team. Will sign off.    Arlene Crandall MD

## 2020-12-28 NOTE — PROGRESS NOTES
[facility]  Progress Note     Celestino Goyal Patient Status:  Inpatient    1935 MRN [id number]   [facility] 3NE-A Attending Amaya Leigh MD   Hosp Day # 6 PCP Oscar Chairez MD     Chief Complaint: confusion    S: Patient giorgi Clearance: 24.2 mL/min (A) (based on SCr of 1.34 mg/dL (H)). No results for input(s): PTP, INR in the last 168 hours. No results for input(s): TROP, CK in the last 168 hours. Imaging: Imaging data reviewed in Epic.     Medications:   • enoxapa

## 2020-12-29 LAB
ALBUMIN SERPL-MCNC: 1.5 G/DL (ref 3.4–5)
ALBUMIN/GLOB SERPL: 0.5 {RATIO} (ref 1–2)
ALP LIVER SERPL-CCNC: 45 U/L
ALT SERPL-CCNC: 18 U/L
ANION GAP SERPL CALC-SCNC: 6 MMOL/L (ref 0–18)
AST SERPL-CCNC: 34 U/L (ref 15–37)
BILIRUB SERPL-MCNC: 0.2 MG/DL (ref 0.1–2)
BUN BLD-MCNC: 21 MG/DL (ref 7–18)
BUN/CREAT SERPL: 19.1 (ref 10–20)
CALCIUM BLD-MCNC: 7 MG/DL (ref 8.5–10.1)
CHLORIDE SERPL-SCNC: 116 MMOL/L (ref 98–112)
CO2 SERPL-SCNC: 18 MMOL/L (ref 21–32)
CREAT BLD-MCNC: 1.1 MG/DL
GLOBULIN PLAS-MCNC: 3.2 G/DL (ref 2.8–4.4)
GLUCOSE BLD-MCNC: 118 MG/DL (ref 70–99)
M PROTEIN MFR SERPL ELPH: 4.7 G/DL (ref 6.4–8.2)
OSMOLALITY SERPL CALC.SUM OF ELEC: 294 MOSM/KG (ref 275–295)
POTASSIUM SERPL-SCNC: 3.7 MMOL/L (ref 3.5–5.1)
SARS-COV-2 RNA RESP QL NAA+PROBE: DETECTED
SODIUM SERPL-SCNC: 140 MMOL/L (ref 136–145)

## 2020-12-29 PROCEDURE — 99232 SBSQ HOSP IP/OBS MODERATE 35: CPT | Performed by: HOSPITALIST

## 2020-12-29 RX ORDER — POTASSIUM CHLORIDE 14.9 MG/ML
20 INJECTION INTRAVENOUS ONCE
Status: COMPLETED | OUTPATIENT
Start: 2020-12-29 | End: 2020-12-29

## 2020-12-29 NOTE — COVID NURSING ASSESSMENT
COVID-19 Daily Discharge Readiness-Nursing    O2 Sat at Rest:   96% on RA  O2 Sat with Exertion:    % on    liters   Temperature max from last 24 hrs: Temp (24hrs), Av.9 °F (37.2 °C), Min:97.7 °F (36.5 °C), Max:101.5 °F (38.6 °C)    Inflammatory Marker

## 2020-12-29 NOTE — SLP NOTE
SPEECH DAILY NOTE - INPATIENT    ASSESSMENT & PLAN   ASSESSMENT  Pt seen this AM for dysphagia therapy session. RN approved session and reported good tolerance of diet without concerns of aspiration. RN reported limited PO intake.  Ongoing calorie count at 12/26/20  Number of Visits to Meet Established Goals: 1    Session: 1/1    If you have any questions, please contact Jayjay Resendiz

## 2020-12-29 NOTE — PLAN OF CARE
Problem: Patient/Family Goals  Goal: Patient/Family Long Term Goal  Description: Patient's Long Term Goal: back to [facility]    Interventions:  -IVF, abx, skin care, tele monitoring, lovenox, consult palliative care    - See additional Care Plan goals specific gravity, serum osmolarity and serum sodium as indicated or ordered  - Monitor response to interventions for patient's volume status, including labs, urine output, blood pressure (other measures as available)  - Encourage oral intake as appropriate

## 2020-12-29 NOTE — DIETARY NOTE
Clinical Nutrition   3 day calorie count started - envelope hanging on door and will be monitored daily by RD.    Diet order: Pureed  12/29:   Breakfast: 410  calories,  22 grams protein     Sergio Ramires RD, LDN  Pager [phone]

## 2020-12-29 NOTE — PROGRESS NOTES
[facility]                INFECTIOUS DISEASE PROGRESS NOTE    Racquel Rincon Patient Status:  Inpatient    1935 MRN [id number]   [facility] 5NW-A Attending Selene Acevedo MD   Hosp Day # 7 PCP Ferny Madrigal MD     Antibiotics values in this interval not displayed. No results found for: [facility] Encounter on 12/22/20   1.  URINE CULTURE, ROUTINE     Status: None (Preliminary result)    Collection Time: 12/28/20  6:39 AM    Specimen: Urine, clean catch ([facility] [address]     Severe dehydration     COVID-19        ASSESSMENT/PLAN:  1.  COVID, testing positive on 12/27  From NH  -continued fever  -Remdesivir started 12/28    No role for steroids/ on room air      MD Pari Bob Infectious Disease Consultan

## 2020-12-29 NOTE — COVID NURSING ASSESSMENT
COVID-19 Daily Discharge Readiness-Nursing    O2 Sat at Rest:     96% room air  O2 Sat with Exertion:    % on    liters   Temperature max from last 24 hrs: Temp (24hrs), Av.3 °F (37.4 °C), Min:97.7 °F (36.5 °C), Max:101.8 °F (38.8 °C)    Inflammatory M

## 2020-12-29 NOTE — COVID NURSING ASSESSMENT
COVID-19 Daily Discharge Readiness-Nursing    O2 Sat at Rest:    95 % on room air  O2 Sat with Exertion:    % on    liters   Temperature max from last 24 hrs: Temp (24hrs), Av.2 °F (37.3 °C), Min:97.7 °F (36.5 °C), Max:101.5 °F (38.6 °C)    Inflammator

## 2020-12-29 NOTE — PLAN OF CARE
Problem: Patient/Family Goals  Goal: Patient/Family Long Term Goal  Description: Patient's Long Term Goal: back to [facility]    Interventions:  -IVF, abx, skin care, tele monitoring, lovenox, consult palliative care    - See additional Care Plan goals for patient's volume status, including labs, urine output, blood pressure (other measures as available)  - Encourage oral intake as appropriate  - Instruct patient on fluid and nutrition restrictions as appropriate  Outcome: Progressing     Problem: SKIN/T

## 2020-12-29 NOTE — PROGRESS NOTES
[facility]                INFECTIOUS DISEASE PROGRESS NOTE    Deloris Millan Patient Status:  Inpatient    1935 MRN [id number]   [facility] 5NW-A Attending Anel Guillemro MD   Hosp Day # 6 PCP Roxi Perez MD     Antibiotics < > = values in this interval not displayed. No results found for: [facility] Encounter on 12/22/20   1.  BLOOD CULTURE     Status: None    Collection Time: 12/22/20  5:55 PM    Specimen: Blood,peripheral   Result Value Ref Ran kidney injury ([facility] [address]     Severe dehydration     COVID-19        ASSESSMENT/PLAN:  1.  COVID, testing positive on 12/27  From NH  Fever  -may beneift from Remdesivir with early onset of symptoms and risk of complications  Can start, no loading dose due extrem

## 2020-12-29 NOTE — PROGRESS NOTES
[facility]  Progress Note     Dave Meyerehn Patient Status:  Inpatient    1935 MRN [id number]   [facility] 3NE-A Attending Arnulfo Regalado MD   Hosp Day # 7 PCP Micheal Back MD     Chief Complaint: confusion    S: Patient giorgi Clearance: 28.7 mL/min (A) (based on SCr of 1.1 mg/dL (H)). No results for input(s): PTP, INR in the last 168 hours. No results for input(s): TROP, CK in the last 168 hours. Imaging: Imaging data reviewed in Epic.     Medications:   • potassiu

## 2020-12-30 LAB
ALBUMIN SERPL-MCNC: 1.5 G/DL (ref 3.4–5)
ALBUMIN/GLOB SERPL: 0.4 {RATIO} (ref 1–2)
ALP LIVER SERPL-CCNC: 52 U/L
ALT SERPL-CCNC: 20 U/L
ANION GAP SERPL CALC-SCNC: 4 MMOL/L (ref 0–18)
AST SERPL-CCNC: 36 U/L (ref 15–37)
BILIRUB SERPL-MCNC: 0.2 MG/DL (ref 0.1–2)
BUN BLD-MCNC: 19 MG/DL (ref 7–18)
BUN/CREAT SERPL: 20 (ref 10–20)
CALCIUM BLD-MCNC: 7.9 MG/DL (ref 8.5–10.1)
CHLORIDE SERPL-SCNC: 114 MMOL/L (ref 98–112)
CO2 SERPL-SCNC: 21 MMOL/L (ref 21–32)
CREAT BLD-MCNC: 0.95 MG/DL
GLOBULIN PLAS-MCNC: 3.5 G/DL (ref 2.8–4.4)
GLUCOSE BLD-MCNC: 90 MG/DL (ref 70–99)
M PROTEIN MFR SERPL ELPH: 5 G/DL (ref 6.4–8.2)
OSMOLALITY SERPL CALC.SUM OF ELEC: 290 MOSM/KG (ref 275–295)
POTASSIUM SERPL-SCNC: 4.3 MMOL/L (ref 3.5–5.1)
SODIUM SERPL-SCNC: 139 MMOL/L (ref 136–145)

## 2020-12-30 PROCEDURE — 99232 SBSQ HOSP IP/OBS MODERATE 35: CPT | Performed by: HOSPITALIST

## 2020-12-30 NOTE — COVID NURSING ASSESSMENT
COVID-19 Daily Discharge Readiness-Nursing    O2 Sat at Rest:   96% on RA  Temperature max from last 24 hrs: Temp (24hrs), Av.5 °F (36.9 °C), Min:97.5 °F (36.4 °C), Max:101.3 °F (38.5 °C)    Inflammatory Markers:   Recent Labs   Lab 20  [id number]

## 2020-12-30 NOTE — CM/SW NOTE
ALICIA spoke to daughter today re: dc planning. Family still deciding on dc to [address] with 24 hour cg. Daughter not sure if [facility] [place] knew pt was COVID positive. ALICIA sent updates to [facility] in [address]- await response.   They initially accepted pt

## 2020-12-30 NOTE — PROGRESS NOTES
[facility]  Progress Note     Racquel Rincon Patient Status:  Inpatient    1935 MRN [id number]   [facility] 3NE-A Attending Selene Acevedo MD   Hosp Day # 8 PCP Ferny Madrigal MD     Chief Complaint: confusion    S: Patient giorgi 50 mcg Oral Before breakfast   • Memantine HCl  10 mg Oral BID       ASSESSMENT / PLAN:     1. UTI? Stent in place; Unlikely real infection; finished 5 days of zosyn anyway; monitor off abx unless otherwise requested by infectious disease  2.  Covid+ suspe

## 2020-12-30 NOTE — COVID NURSING ASSESSMENT
COVID-19 Daily Discharge Readiness-Nursing  Temperature max from last 24 hrs: Temp (24hrs), Av.8 °F (36.6 °C), Min:97.5 °F (36.4 °C), Max:98.3 °F (36.8 °C)  patient is alert and oriented to self, poor historian, responds to verbal and tactile stimuli.

## 2020-12-30 NOTE — DIETARY NOTE
Clinical Nutrition   3 day calorie count started - envelope hanging on door and will be monitored daily by RD.    Diet order: Pureed  12/29:   Breakfast: 410  calories,  22 grams protein   Lunch: 731 calories, 31 grams protein  Dinner: 578 calories, 28 gr

## 2020-12-31 LAB
ALBUMIN SERPL-MCNC: 1.7 G/DL (ref 3.4–5)
ALBUMIN/GLOB SERPL: 0.5 {RATIO} (ref 1–2)
ALP LIVER SERPL-CCNC: 65 U/L
ALT SERPL-CCNC: 24 U/L
ANION GAP SERPL CALC-SCNC: 6 MMOL/L (ref 0–18)
AST SERPL-CCNC: 33 U/L (ref 15–37)
BILIRUB SERPL-MCNC: 0.2 MG/DL (ref 0.1–2)
BUN BLD-MCNC: 16 MG/DL (ref 7–18)
BUN/CREAT SERPL: 17 (ref 10–20)
CALCIUM BLD-MCNC: 8.1 MG/DL (ref 8.5–10.1)
CHLORIDE SERPL-SCNC: 113 MMOL/L (ref 98–112)
CO2 SERPL-SCNC: 19 MMOL/L (ref 21–32)
CREAT BLD-MCNC: 0.94 MG/DL
GLOBULIN PLAS-MCNC: 3.5 G/DL (ref 2.8–4.4)
GLUCOSE BLD-MCNC: 86 MG/DL (ref 70–99)
M PROTEIN MFR SERPL ELPH: 5.2 G/DL (ref 6.4–8.2)
OSMOLALITY SERPL CALC.SUM OF ELEC: 286 MOSM/KG (ref 275–295)
POTASSIUM SERPL-SCNC: 4.1 MMOL/L (ref 3.5–5.1)
SODIUM SERPL-SCNC: 138 MMOL/L (ref 136–145)

## 2020-12-31 PROCEDURE — 99232 SBSQ HOSP IP/OBS MODERATE 35: CPT | Performed by: HOSPITALIST

## 2020-12-31 NOTE — PROGRESS NOTES
[facility]                INFECTIOUS DISEASE PROGRESS NOTE    Janae Blankenship Patient Status:  Inpatient    1935 MRN [id number]   [facility] 5NW-A Attending Monika Hopper MD   [facility] Day # 9 PCP Juliana Aparicio MD     Antibiotics Collection Time: 12/22/20  5:55 PM    Specimen: Blood,peripheral   Result Value Ref Range    Blood Culture Result No Growth 5 Days N/A     COVID-19 Lab Results    COVID-19  Lab Results   Component Value Date    COVID19 Detected (A) 12/28/2020    COVID19 [place]

## 2020-12-31 NOTE — CM/SW NOTE
Sw spoke to daughter Marc Genao - explained that pt cannot got to [facility] due to COVID status. Family was aware of that and they plan to have her dc back to [facility] with caregiver ( cg support is from 11am-5pm).     Family plans for pt to return to

## 2020-12-31 NOTE — PROGRESS NOTES
[facility]                INFECTIOUS DISEASE PROGRESS NOTE    Selena Peter Patient Status:  Inpatient    1935 MRN [id number]   [facility] 5NW-A Attending Carolyn Rosenthal MD   Hosp Day # 8 PCP Camila Valladares MD     Antibiotics BLOOD CULTURE     Status: None    Collection Time: 12/22/20  5:55 PM    Specimen: Blood,peripheral   Result Value Ref Range    Blood Culture Result No Growth 5 Days N/A     COVID-19 Lab Results    COVID-19  Lab Results   Component Value Date    COVID19 [facility]  [phone]

## 2020-12-31 NOTE — PROGRESS NOTES
[facility]  Progress Note     Imagene Levels Patient Status:  Inpatient    1935 MRN [id number]   [facility] 3NE-A Attending Jai Mcfadden MD   [facility] Day # 9 PCP Aparna [place] MD     Chief Complaint: confusion    S: Patient giorgi • Memantine HCl  10 mg Oral BID       ASSESSMENT / PLAN:     1. UTI? Stent in place; Unlikely real infection; finished 5 days of zosyn anyway; monitor off abx unless otherwise requested by infectious disease  2.  Covid+ suspect explains ongoing fevers; r

## 2020-12-31 NOTE — DIETARY NOTE
[facility]    NUTRITION ASSESSMENT    Pt does not meet malnutrition criteria.     NUTRITION DIAGNOSIS/PROBLEM:    Increased nutrient needs related to increased demands of healing as evidenced by multiple wounds including stage II PU to sacrum     NUTRI HISTORY:  Appetite: Fair  Intake: not documented  Intake Meeting Needs: Marginal, added supplements  Food Allergies: No  Cultural/Ethnic/Scientologist Preferences Addresses: Yes    NUTRITION RELATED PHYSICAL FINDINGS:     1. Body Fat/Muscle Mass: AURORA per Jazz Dasilva

## 2020-12-31 NOTE — COVID NURSING ASSESSMENT
COVID-19 Daily Discharge Readiness-Nursing    O2 Sat at Rest:    95% on RA  Temperature max from last 24 hrs: Temp (24hrs), Av.7 °F (36.5 °C), Min:97.3 °F (36.3 °C), Max:98.3 °F (36.8 °C)    Inflammatory Markers:   Recent Labs   Lab 20  [id number]

## 2020-12-31 NOTE — COVID NURSING ASSESSMENT
COVID-19 Daily Discharge Readiness-Nursing    O2 Sat at Rest:     98%   O2 Sat with Exertion:   98 % on   RA.   Total care patient  Temperature max from last 24 hrs: Temp (24hrs), Av.6 °F (36.4 °C), Min:97.3 °F (36.3 °C), Max:97.9 °F (36.6 °C)    Inflam

## 2020-12-31 NOTE — PLAN OF CARE
1200:  Paged [facility] regarding questions about when the possible stent removal/exchange would be able to take place due to patient's COVID+ status. Awaiting call back.

## 2021-01-01 LAB
ALBUMIN SERPL-MCNC: 1.8 G/DL (ref 3.4–5)
ALBUMIN/GLOB SERPL: 0.5 {RATIO} (ref 1–2)
ALP LIVER SERPL-CCNC: 74 U/L
ALT SERPL-CCNC: 39 U/L
ANION GAP SERPL CALC-SCNC: 4 MMOL/L (ref 0–18)
ANTIBODY SCREEN: NEGATIVE
AST SERPL-CCNC: 62 U/L (ref 15–37)
BILIRUB SERPL-MCNC: 0.2 MG/DL (ref 0.1–2)
BUN BLD-MCNC: 19 MG/DL (ref 7–18)
BUN/CREAT SERPL: 21.3 (ref 10–20)
CALCIUM BLD-MCNC: 8.1 MG/DL (ref 8.5–10.1)
CHLORIDE SERPL-SCNC: 114 MMOL/L (ref 98–112)
CO2 SERPL-SCNC: 21 MMOL/L (ref 21–32)
CREAT BLD-MCNC: 0.89 MG/DL
DEPRECATED RDW RBC AUTO: 49.6 FL (ref 35.1–46.3)
ERYTHROCYTE [DISTWIDTH] IN BLOOD BY AUTOMATED COUNT: 14.6 % (ref 11–15)
GLOBULIN PLAS-MCNC: 3.4 G/DL (ref 2.8–4.4)
GLUCOSE BLD-MCNC: 91 MG/DL (ref 70–99)
HCT VFR BLD AUTO: 23.6 %
HGB BLD-MCNC: 10.1 G/DL
HGB BLD-MCNC: 7.3 G/DL
M PROTEIN MFR SERPL ELPH: 5.2 G/DL (ref 6.4–8.2)
MCH RBC QN AUTO: 28.9 PG (ref 26–34)
MCHC RBC AUTO-ENTMCNC: 30.9 G/DL (ref 31–37)
MCV RBC AUTO: 93.3 FL
OSMOLALITY SERPL CALC.SUM OF ELEC: 290 MOSM/KG (ref 275–295)
PLATELET # BLD AUTO: 268 10(3)UL (ref 150–450)
POTASSIUM SERPL-SCNC: 3.9 MMOL/L (ref 3.5–5.1)
RBC # BLD AUTO: 2.53 X10(6)UL
RH BLOOD TYPE: POSITIVE
SODIUM SERPL-SCNC: 139 MMOL/L (ref 136–145)
WBC # BLD AUTO: 7 X10(3) UL (ref 4–11)

## 2021-01-01 PROCEDURE — 99232 SBSQ HOSP IP/OBS MODERATE 35: CPT | Performed by: HOSPITALIST

## 2021-01-01 RX ORDER — ENOXAPARIN SODIUM 100 MG/ML
40 INJECTION SUBCUTANEOUS DAILY
Status: DISCONTINUED | OUTPATIENT
Start: 2021-01-02 | End: 2021-01-02

## 2021-01-01 RX ORDER — SODIUM CHLORIDE 9 MG/ML
INJECTION, SOLUTION INTRAVENOUS ONCE
Status: COMPLETED | OUTPATIENT
Start: 2021-01-01 | End: 2021-01-01

## 2021-01-01 NOTE — PROGRESS NOTES
[facility]                INFECTIOUS DISEASE PROGRESS NOTE    Leonie  Patient Status:  Inpatient    1935 MRN [id number]   [facility] 5NW-A Attending Charu Ness MD   Hosp Day # 10 PCP Franklin Hermosillo MD     Antibiotic 12/22/20  5:55 PM    Specimen: Blood,peripheral   Result Value Ref Range    Blood Culture Result No Growth 5 Days N/A     COVID-19 Lab Results    COVID-19  Lab Results   Component Value Date    COVID19 Detected (A) 12/28/2020    COVID19 Detected (A) 12/27/

## 2021-01-01 NOTE — COVID NURSING ASSESSMENT
COVID-19 Daily Discharge Readiness-Nursing  Temperature max from last 24 hrs: Temp (24hrs), Av.4 °F (36.9 °C), Min:97.6 °F (36.4 °C), Max:99.3 °F (37.4 °C)  Patient drowsy this AM, can state name at times but nothing beyond that, responds to verbal and

## 2021-01-01 NOTE — PROGRESS NOTES
Pharmacy Note: Renal dose adjustment     Shannon Gutierrez was originally prescribed enoxaparin (Lovenox) 40 mg every 24 hours which was renally dose adjusted at the time of the original order per P&T approved renal dosing protocol.   Renal function has now

## 2021-01-01 NOTE — COVID NURSING ASSESSMENT
COVID-19 Daily Discharge Readiness-Nursing    O2 Sat at Rest:     93% on RA  Temperature max from last 24 hrs: Temp (24hrs), Av.9 °F (36.6 °C), Min:97.3 °F (36.3 °C), Max:98.7 °F (37.1 °C)    Inflammatory Markers:   Recent Labs   Lab 20  [id number]

## 2021-01-02 VITALS
WEIGHT: 110.69 LBS | TEMPERATURE: 98 F | SYSTOLIC BLOOD PRESSURE: 112 MMHG | HEART RATE: 68 BPM | HEIGHT: 64 IN | OXYGEN SATURATION: 96 % | BODY MASS INDEX: 18.9 KG/M2 | DIASTOLIC BLOOD PRESSURE: 73 MMHG | RESPIRATION RATE: 12 BRPM

## 2021-01-02 LAB — BLOOD TYPE BARCODE: 6200

## 2021-01-02 PROCEDURE — 99239 HOSP IP/OBS DSCHRG MGMT >30: CPT | Performed by: INTERNAL MEDICINE

## 2021-01-02 RX ORDER — PANTOPRAZOLE SODIUM 40 MG/1
40 TABLET, DELAYED RELEASE ORAL
Qty: 30 TABLET | Refills: 0 | Status: ON HOLD | OUTPATIENT
Start: 2021-01-02 | End: 2021-01-31

## 2021-01-02 NOTE — DIETARY NOTE
Clinical Nutrition   3 day calorie count completed. Encourage intake of 3 meals per day. Continue to provide ONS at meals to maximize calories and protein.   Diet order: Pureed  12/29:   Breakfast: 410  calories,  22 grams protein   Lunch: 731 calories, 3

## 2021-01-02 NOTE — PROGRESS NOTES
NURSING DISCHARGE NOTE    Discharged Nursing home via Ambulance. Accompanied by Support staff  Belongings Taken by patient/family. Report given to Severiano at [facility]. IV taken out. Discharge Juarez completed.  Pt updated on the plan of care and discha

## 2021-01-02 NOTE — PLAN OF CARE
Problem: Patient/Family Goals  Goal: Patient/Family Long Term Goal  Description: Patient's Long Term Goal: back to [facility]    Interventions:  -IVF, abx, skin care, tele monitoring, lovenox, consult palliative care    - See additional Care Plan goals fever/infection during anticipated neutropenic period  Description: INTERVENTIONS  - Monitor WBC  - Administer growth factors as ordered  - Implement neutropenic guidelines  Outcome: Progressing     Problem: METABOLIC/FLUID AND ELECTROLYTES - ADULT  Goal:

## 2021-01-02 NOTE — DISCHARGE SUMMARY
[facility]  DISCHARGE SUMMARY     Shannon Gutierrez Patient Status:  Inpatient    1935 MRN [id number]   [facility] 5NW-A Attending No att. providers found   [address] Day # 6 PCP Souleymane Daniels MD     Date of Admission: 2020  D 40 MG Tbec  Commonly known as: PROTONIX      Take 1 tablet (40 mg total) by mouth every morning before breakfast.   Quantity: 30 tablet  Refills: 0        CONTINUE taking these medications      Instructions Prescription details   atorvastatin 20 MG Tabs  C Clear to auscultation bilaterally. No wheezes. No rhonchi. Cardiovascular: S1, S2. Regular rate and rhythm. Abdomen: Soft, nontender, nondistended. Positive bowel sounds. Musculoskeletal:contractures x4  Extremities: No edema.   ----------------------

## 2021-01-02 NOTE — COVID NURSING ASSESSMENT
COVID-19 Daily Discharge Readiness-Nursing    O2 Sat at Rest:    97 % on room air    Temperature max from last 24 hrs: Temp (24hrs), Av.1 °F (36.7 °C), Min:96.6 °F (35.9 °C), Max:99.3 °F (37.4 °C)    Inflammatory Markers:   Recent Labs   Lab 20

## 2021-01-02 NOTE — CM/SW NOTE
01/02/21 1400   Discharge disposition   Expected discharge disposition [facility]   Name of Facillity/Home Care/Hospice   (Andrew Fofana)   Discharge transportation [address]   pt is medically cleared for discharge today at [facility] on 12

## 2021-01-30 ENCOUNTER — APPOINTMENT (OUTPATIENT)
Dept: GENERAL RADIOLOGY | Facility: HOSPITAL | Age: 86
DRG: 987 | End: 2021-01-30
Attending: EMERGENCY MEDICINE
Payer: MEDICARE

## 2021-01-30 ENCOUNTER — HOSPITAL ENCOUNTER (INPATIENT)
Facility: HOSPITAL | Age: 86
LOS: 7 days | Discharge: NURSING, REHAB OR LONG-TERM CARE | DRG: 987 | End: 2021-02-08
Attending: EMERGENCY MEDICINE | Admitting: INTERNAL MEDICINE
Payer: MEDICARE

## 2021-01-30 DIAGNOSIS — D64.9 ANEMIA, UNSPECIFIED TYPE: Primary | ICD-10-CM

## 2021-01-30 DIAGNOSIS — E86.0 DEHYDRATION: ICD-10-CM

## 2021-01-30 DIAGNOSIS — K92.2 GASTROINTESTINAL HEMORRHAGE, UNSPECIFIED GASTROINTESTINAL HEMORRHAGE TYPE: ICD-10-CM

## 2021-01-30 LAB
ALBUMIN SERPL-MCNC: 2.3 G/DL (ref 3.4–5)
ALBUMIN/GLOB SERPL: 0.6 {RATIO} (ref 1–2)
ALP LIVER SERPL-CCNC: 67 U/L
ALT SERPL-CCNC: 12 U/L
ANION GAP SERPL CALC-SCNC: 5 MMOL/L (ref 0–18)
ANTIBODY SCREEN: NEGATIVE
AST SERPL-CCNC: 15 U/L (ref 15–37)
BASOPHILS # BLD AUTO: 0.03 X10(3) UL (ref 0–0.2)
BASOPHILS NFR BLD AUTO: 0.3 %
BILIRUB SERPL-MCNC: 0.2 MG/DL (ref 0.1–2)
BUN BLD-MCNC: 25 MG/DL (ref 7–18)
BUN/CREAT SERPL: 18 (ref 10–20)
CALCIUM BLD-MCNC: 8.4 MG/DL (ref 8.5–10.1)
CHLORIDE SERPL-SCNC: 112 MMOL/L (ref 98–112)
CO2 SERPL-SCNC: 24 MMOL/L (ref 21–32)
CREAT BLD-MCNC: 1.39 MG/DL
DEPRECATED RDW RBC AUTO: 51.8 FL (ref 35.1–46.3)
EOSINOPHIL # BLD AUTO: 0.06 X10(3) UL (ref 0–0.7)
EOSINOPHIL NFR BLD AUTO: 0.5 %
ERYTHROCYTE [DISTWIDTH] IN BLOOD BY AUTOMATED COUNT: 15 % (ref 11–15)
GLOBULIN PLAS-MCNC: 4.1 G/DL (ref 2.8–4.4)
GLUCOSE BLD-MCNC: 118 MG/DL (ref 70–99)
HCT VFR BLD AUTO: 26.6 %
HGB BLD-MCNC: 7.8 G/DL
IMM GRANULOCYTES # BLD AUTO: 0.06 X10(3) UL (ref 0–1)
IMM GRANULOCYTES NFR BLD: 0.5 %
LYMPHOCYTES # BLD AUTO: 1.4 X10(3) UL (ref 1–4)
LYMPHOCYTES NFR BLD AUTO: 12.2 %
M PROTEIN MFR SERPL ELPH: 6.4 G/DL (ref 6.4–8.2)
MCH RBC QN AUTO: 28.2 PG (ref 26–34)
MCHC RBC AUTO-ENTMCNC: 29.3 G/DL (ref 31–37)
MCV RBC AUTO: 96 FL
MONOCYTES # BLD AUTO: 0.4 X10(3) UL (ref 0.1–1)
MONOCYTES NFR BLD AUTO: 3.5 %
NEUTROPHILS # BLD AUTO: 9.53 X10 (3) UL (ref 1.5–7.7)
NEUTROPHILS # BLD AUTO: 9.53 X10(3) UL (ref 1.5–7.7)
NEUTROPHILS NFR BLD AUTO: 83 %
OSMOLALITY SERPL CALC.SUM OF ELEC: 297 MOSM/KG (ref 275–295)
PLATELET # BLD AUTO: 267 10(3)UL (ref 150–450)
POTASSIUM SERPL-SCNC: 4.4 MMOL/L (ref 3.5–5.1)
RBC # BLD AUTO: 2.77 X10(6)UL
RH BLOOD TYPE: POSITIVE
SODIUM SERPL-SCNC: 141 MMOL/L (ref 136–145)
WBC # BLD AUTO: 11.5 X10(3) UL (ref 4–11)

## 2021-01-30 PROCEDURE — 71045 X-RAY EXAM CHEST 1 VIEW: CPT | Performed by: EMERGENCY MEDICINE

## 2021-01-30 RX ORDER — MELATONIN
325
COMMUNITY

## 2021-01-31 ENCOUNTER — APPOINTMENT (OUTPATIENT)
Dept: GENERAL RADIOLOGY | Facility: HOSPITAL | Age: 86
DRG: 987 | End: 2021-01-31
Attending: INTERNAL MEDICINE
Payer: MEDICARE

## 2021-01-31 ENCOUNTER — APPOINTMENT (OUTPATIENT)
Dept: ULTRASOUND IMAGING | Facility: HOSPITAL | Age: 86
DRG: 987 | End: 2021-01-31
Attending: INTERNAL MEDICINE
Payer: MEDICARE

## 2021-01-31 PROBLEM — K92.2 GASTROINTESTINAL HEMORRHAGE, UNSPECIFIED GASTROINTESTINAL HEMORRHAGE TYPE: Status: ACTIVE | Noted: 2021-01-31

## 2021-01-31 PROBLEM — E86.0 DEHYDRATION: Status: ACTIVE | Noted: 2021-01-31

## 2021-01-31 PROBLEM — D64.9 ANEMIA, UNSPECIFIED TYPE: Status: ACTIVE | Noted: 2021-01-31

## 2021-01-31 LAB
BASOPHILS # BLD AUTO: 0.06 X10(3) UL (ref 0–0.2)
BASOPHILS NFR BLD AUTO: 0.7 %
BILIRUB UR QL STRIP.AUTO: NEGATIVE
COLOR UR AUTO: YELLOW
CRP SERPL-MCNC: 6.23 MG/DL (ref ?–0.3)
DEPRECATED HBV CORE AB SER IA-ACNC: 234.3 NG/ML
DEPRECATED RDW RBC AUTO: 53.2 FL (ref 35.1–46.3)
EOSINOPHIL # BLD AUTO: 0.13 X10(3) UL (ref 0–0.7)
EOSINOPHIL NFR BLD AUTO: 1.5 %
ERYTHROCYTE [DISTWIDTH] IN BLOOD BY AUTOMATED COUNT: 15.2 % (ref 11–15)
FOLATE SERPL-MCNC: 3.1 NG/ML (ref 8.7–?)
GLUCOSE UR STRIP.AUTO-MCNC: NEGATIVE MG/DL
HAPTOGLOB SERPL-MCNC: 367 MG/DL (ref 30–200)
HCT VFR BLD AUTO: 23.7 %
HGB BLD-MCNC: 7.1 G/DL
HGB RETIC QN AUTO: 32.5 PG (ref 28.2–36.6)
IMM GRANULOCYTES # BLD AUTO: 0.03 X10(3) UL (ref 0–1)
IMM GRANULOCYTES NFR BLD: 0.3 %
IMM RETICS NFR: 0.3 RATIO (ref 0.1–0.3)
IRON SATURATION: 14 %
IRON SERPL-MCNC: 23 UG/DL
KETONES UR STRIP.AUTO-MCNC: NEGATIVE MG/DL
LDH SERPL L TO P-CCNC: 200 U/L
LYMPHOCYTES # BLD AUTO: 2.25 X10(3) UL (ref 1–4)
LYMPHOCYTES NFR BLD AUTO: 26.1 %
MCH RBC QN AUTO: 29.2 PG (ref 26–34)
MCHC RBC AUTO-ENTMCNC: 30 G/DL (ref 31–37)
MCV RBC AUTO: 97.5 FL
MONOCYTES # BLD AUTO: 0.44 X10(3) UL (ref 0.1–1)
MONOCYTES NFR BLD AUTO: 5.1 %
NEUTROPHILS # BLD AUTO: 5.72 X10 (3) UL (ref 1.5–7.7)
NEUTROPHILS # BLD AUTO: 5.72 X10(3) UL (ref 1.5–7.7)
NEUTROPHILS NFR BLD AUTO: 66.3 %
NITRITE UR QL STRIP.AUTO: NEGATIVE
PH UR STRIP.AUTO: 7 [PH] (ref 4.5–8)
PLATELET # BLD AUTO: 234 10(3)UL (ref 150–450)
PROT UR STRIP.AUTO-MCNC: 100 MG/DL
RBC # BLD AUTO: 2.43 X10(6)UL
RBC #/AREA URNS AUTO: >10 /HPF
RETICS # AUTO: 51 X10(3) UL (ref 22.5–147.5)
RETICS/RBC NFR AUTO: 2.1 %
SP GR UR STRIP.AUTO: 1.01 (ref 1–1.03)
T4 FREE SERPL-MCNC: 0.9 NG/DL (ref 0.8–1.7)
TOTAL IRON BINDING CAPACITY: 162 UG/DL (ref 240–450)
TRANSFERRIN SERPL-MCNC: 109 MG/DL (ref 200–360)
TSI SER-ACNC: 12 MIU/ML (ref 0.36–3.74)
UROBILINOGEN UR STRIP.AUTO-MCNC: <2 MG/DL
VIT B12 SERPL-MCNC: 371 PG/ML (ref 193–986)
WBC # BLD AUTO: 8.6 X10(3) UL (ref 4–11)
WBC #/AREA URNS AUTO: >50 /HPF

## 2021-01-31 PROCEDURE — 74018 RADEX ABDOMEN 1 VIEW: CPT | Performed by: INTERNAL MEDICINE

## 2021-01-31 PROCEDURE — 93970 EXTREMITY STUDY: CPT | Performed by: INTERNAL MEDICINE

## 2021-01-31 PROCEDURE — 99223 1ST HOSP IP/OBS HIGH 75: CPT | Performed by: INTERNAL MEDICINE

## 2021-01-31 RX ORDER — ONDANSETRON 2 MG/ML
4 INJECTION INTRAMUSCULAR; INTRAVENOUS EVERY 6 HOURS PRN
Status: DISCONTINUED | OUTPATIENT
Start: 2021-01-31 | End: 2021-02-08

## 2021-01-31 RX ORDER — POLYETHYLENE GLYCOL 3350 17 G/17G
17 POWDER, FOR SOLUTION ORAL DAILY
Status: DISCONTINUED | OUTPATIENT
Start: 2021-01-31 | End: 2021-02-08

## 2021-01-31 RX ORDER — BISACODYL 10 MG
10 SUPPOSITORY, RECTAL RECTAL
Status: DISCONTINUED | OUTPATIENT
Start: 2021-01-31 | End: 2021-02-08

## 2021-01-31 RX ORDER — SULFAMETHOXAZOLE AND TRIMETHOPRIM 400; 80 MG/1; MG/1
1 TABLET ORAL EVERY OTHER DAY
Status: ON HOLD | COMMUNITY
End: 2021-03-18

## 2021-01-31 RX ORDER — BISACODYL 10 MG
10 SUPPOSITORY, RECTAL RECTAL
COMMUNITY

## 2021-01-31 RX ORDER — SODIUM CHLORIDE 450 MG/100ML
INJECTION, SOLUTION INTRAVENOUS CONTINUOUS
Status: ACTIVE | OUTPATIENT
Start: 2021-01-31 | End: 2021-02-01

## 2021-01-31 RX ORDER — ACETAMINOPHEN 325 MG/1
650 TABLET ORAL EVERY 6 HOURS PRN
Status: DISCONTINUED | OUTPATIENT
Start: 2021-01-31 | End: 2021-02-08

## 2021-01-31 RX ORDER — ESCITALOPRAM OXALATE 10 MG/1
10 TABLET ORAL DAILY
Status: DISCONTINUED | OUTPATIENT
Start: 2021-01-31 | End: 2021-02-08

## 2021-01-31 RX ORDER — CEPHALEXIN 250 MG/1
250 CAPSULE ORAL DAILY
Status: ON HOLD | COMMUNITY
End: 2021-01-31

## 2021-01-31 RX ORDER — SENNA AND DOCUSATE SODIUM 50; 8.6 MG/1; MG/1
2 TABLET, FILM COATED ORAL DAILY
Status: DISCONTINUED | OUTPATIENT
Start: 2021-01-31 | End: 2021-01-31

## 2021-01-31 RX ORDER — LEVOTHYROXINE SODIUM 0.05 MG/1
50 TABLET ORAL
Status: DISCONTINUED | OUTPATIENT
Start: 2021-01-31 | End: 2021-02-05

## 2021-01-31 RX ORDER — FLUCONAZOLE 100 MG/1
150 TABLET ORAL WEEKLY
Status: DISCONTINUED | OUTPATIENT
Start: 2021-02-04 | End: 2021-02-05

## 2021-01-31 RX ORDER — FOLIC ACID 1 MG/1
1 TABLET ORAL DAILY
Status: DISCONTINUED | OUTPATIENT
Start: 2021-01-31 | End: 2021-02-08

## 2021-01-31 RX ORDER — SENNA AND DOCUSATE SODIUM 50; 8.6 MG/1; MG/1
2 TABLET, FILM COATED ORAL DAILY
COMMUNITY

## 2021-01-31 RX ORDER — DONEPEZIL HYDROCHLORIDE 10 MG/1
10 TABLET, FILM COATED ORAL NIGHTLY
Status: DISCONTINUED | OUTPATIENT
Start: 2021-01-31 | End: 2021-02-05

## 2021-01-31 RX ORDER — CLONAZEPAM 0.5 MG/1
0.25 TABLET ORAL 2 TIMES DAILY PRN
Status: DISCONTINUED | OUTPATIENT
Start: 2021-01-31 | End: 2021-02-05

## 2021-01-31 RX ORDER — DEXTROSE AND SODIUM CHLORIDE 5; .45 G/100ML; G/100ML
83 INJECTION, SOLUTION INTRAVENOUS CONTINUOUS
Status: ON HOLD | COMMUNITY
End: 2021-01-31

## 2021-01-31 RX ORDER — PANTOPRAZOLE SODIUM 40 MG/1
40 TABLET, DELAYED RELEASE ORAL
Status: DISCONTINUED | OUTPATIENT
Start: 2021-01-31 | End: 2021-02-08

## 2021-01-31 RX ORDER — UBIDECARENONE 75 MG
100 CAPSULE ORAL DAILY
Status: DISCONTINUED | OUTPATIENT
Start: 2021-01-31 | End: 2021-02-08

## 2021-01-31 RX ORDER — MELATONIN
325
Status: DISCONTINUED | OUTPATIENT
Start: 2021-01-31 | End: 2021-01-31

## 2021-01-31 RX ORDER — ATORVASTATIN CALCIUM 20 MG/1
20 TABLET, FILM COATED ORAL NIGHTLY
Status: DISCONTINUED | OUTPATIENT
Start: 2021-01-31 | End: 2021-02-08

## 2021-01-31 RX ORDER — MEMANTINE HYDROCHLORIDE 10 MG/1
10 TABLET ORAL 2 TIMES DAILY
Status: DISCONTINUED | OUTPATIENT
Start: 2021-01-31 | End: 2021-02-05

## 2021-01-31 NOTE — ED PROVIDER NOTES
Patient Seen in: [facility] Emergency Department      History   Patient presents with:  Abnormal Result    Stated Complaint: Abnormal Labs    HPI/Subjective:   HPI    80-year-old female was sent to the emergency department secondary to a low hemoglo range of motion and neck supple. Cardiovascular:      Rate and Rhythm: Normal rate and regular rhythm. Heart sounds: Normal heart sounds. Pulmonary:      Effort: Pulmonary effort is normal.      Breath sounds: Normal breath sounds. No stridor.  No PLATELET.   Procedure                               Abnormality         Status                     ---------                               -----------         ------                     CBC W/ DIFFERENTIAL[[id number]]          Abnormal            Final resul in the hospital.  Patient son was updated as well  Admission disposition: 1/31/2021 12:53 AM                              Disposition and Plan     Clinical Impression:  Anemia, unspecified type  (primary encounter diagnosis)  Gastrointestinal hemorrhage, u

## 2021-01-31 NOTE — ED NOTES
Patient reposition, pillows utilized to reposition and turn patient. Patient arrived with protective booties from NH for heels and wedge for in between her legs.

## 2021-01-31 NOTE — CONSULTS
[facility]                       Gastroenterology [address] [facility] Gastroenterology    Maryanne Hernández Patient Status:  Observation    1935 MRN [id number]   [facility] 4NW-A Attending Anderson Saldana MD   [facility] ferrous sulfate EC tab 325 mg, 325 mg, Oral, Daily with breakfast    •  [START ON 2/4/2021] fluconazole (DIFLUCAN) tab 150 mg, 150 mg, Oral, Weekly    •  Levothyroxine Sodium (SYNTHROID) tab 50 mcg, 50 mcg, Oral, Before breakfast    •  Memantine HCl (NAMEN 01/30/2021    BUN 25 01/30/2021     01/30/2021    K 4.4 01/30/2021     01/30/2021    CO2 24.0 01/30/2021     01/30/2021    CA 8.4 01/30/2021    ALB 2.3 01/30/2021    ALKPHO 67 01/30/2021    BILT 0.2 01/30/2021    AST 15 01/30/2021    ALT needed, consider urology consult    Plan of care d/w daughter Lourdes Hernandez who is POA and wished to pursue conservative measures unless life threatening bleed. Total time spent with patient was 35 minutes.   The entirety of the time was spent in review of the

## 2021-01-31 NOTE — CONSULTS
THE [facility] [place] [facility] Hematology and Oncology Consult Note    Reason for Consult: Anemia  Medical Record Number: [id number]   CSN: [id number]   Referring Physician: No ref.  provider found  PCP: Elissa Alanis MD    History of Present Illness: 80F with a PMH of RLE DVT (dx [facility]) tab 10 mg, 10 mg, Oral, BID  •  Pantoprazole Sodium (PROTONIX) EC tab 40 mg, 40 mg, Oral, QAM AC  •  0.45% NaCl infusion, , Intravenous, Continuous  •  bisacodyl (DULCOLAX) rectal suppository 10 mg, 10 mg, Rectal, Q24H PRN  •  Senna-Docusate Sodi Date    WBC 8.6 01/31/2021    HGB 7.1 (L) 01/31/2021    HCT 23.7 (L) 01/31/2021    MCV 97.5 01/31/2021    .0 01/31/2021     Lab Results   Component Value Date     01/30/2021    K 4.4 01/30/2021    CO2 24.0 01/30/2021     01/30/2021    BU

## 2021-01-31 NOTE — PROGRESS NOTES
THE [facility] [place] [place] Hematology and Oncology Progress Note   Length of Stay: 0    Subjective: Hb down to 6.9. Plan for cystoscopy later this week. Dopplers with RLE DVT. D-dimer elevated to 1.15.  I talked to the Maryla Metro today, given ongoing bleeding, recommend IVC donavan Dementia, Right Hydronephrosis s/p ureteral stent, Sacral wound, COVID 19 (12/27/20) and anemia presented from assisted living with anemia.      History of RLE DVT: Dx 2/4/20, likely 2/2 immobility   - Per daughter, she did not start Xarelto initially due

## 2021-01-31 NOTE — PROGRESS NOTES
ADULT SWALLOWING EVALUATION    ASSESSMENT    ASSESSMENT/OVERALL IMPRESSION:    The patient is a 80-year old female nursing home resident from [facility]. Admitted to [facility] due to abnormal labs.  Current dx: Dehydration/ Gastrointestinal Hem routine, and swallow re-evaluation. RN verbalized agreement with recommendations.    RECOMMENDATIONS   Diet Recommendations - Solids: NPO  Diet Recommendations - Liquid: NPO      Compensatory Strategies Recommended: (TBD)     Medication Administration Recom of Presentation: Staff/Clinician assistance;Spoon;Cup;Single sips  Patient Positioning: Upright;Midline    Oral Phase of Swallow: Impaired  Bolus Retrieval: Impaired  Bilabial Seal: Intact  Bolus Formation: Impaired  Bolus Propulsion: Impaired  Mastication

## 2021-01-31 NOTE — PLAN OF CARE
NURSING ADMISSION NOTE      Patient admitted via cart. Oriented to room. Safety precautions initiated. Bed in low position. Call light in reach. Pt confused. Unable to follow commands. Unable check eyes, pt clenches eyes.  Incontinent of bowel and

## 2021-01-31 NOTE — PLAN OF CARE
Problem: GASTROINTESTINAL - ADULT  Goal: Maintains adequate nutritional intake (undernourished)  Description: INTERVENTIONS:  - Monitor percentage of each meal consumed  - Identify factors contributing to decreased intake, treat as appropriate  - Assist part  Description: INTERVENTIONS:  - Support and protect limb and body alignment per provider's orders  - Instruct and reinforce with patient and family use of appropriate assistive device and precautions (e.g. spinal or hip dislocation precautions)  Santa Anand

## 2021-01-31 NOTE — DIETARY NOTE
[facility]    NUTRITION INITIAL ASSESSMENT    Pt does not meet malnutrition criteria.       NUTRITION DIAGNOSIS/PROBLEM:    Increased nutrient needs related to increased demands of healing as evidenced by multiple wounds including stage II PU to sacrum lb)  01/20/16 : 62.1 kg (137 lb)       NUTRITION:  Diet: Orders Placed This Encounter      Regular/General diet Fluid Consistency: Nectar Thickened Liquids; Is Patient on Accuchecks?  No     Oral Supplements: Magic Cup BID    Percent Meals Eaten (last 3 day

## 2021-01-31 NOTE — ED NOTES
Pt was incontinent of stool, incontinent care done, stool was brown blood noted on depends. MD notified. Pt also has pre-existing bedsores on bottom per pts son, noted pt does have bed sores. Per son pt also has pre-existing sores on both legs.  vinny donnelly

## 2021-01-31 NOTE — H&P
[facility] Patient Status:  Observation    1935 MRN [id number]   [facility] 4NW-A Attending Kiersten Guerrero MD   Hosp Day # 0 PCP Margot Laughlin MD     Date:  2021  Date of Admissio Oral Cap, Take 250 mg by mouth daily. For recurrent UTI    •  bisacodyl (DULCOLAX) 10 MG Rectal Suppos, Place 10 mg rectally Q24H PRN. •  Senna-Docusate Sodium 8.6-50 MG Oral Tab, Take 2 tablets by mouth daily.     •  sulfamethoxazole-trimethoprim 400-80 24 hours ending 01/31/21 1041    Exam  General Appearance:    Alert x1, cooperative, no distress, appears stated age   Head:    Normocephalic,  atraumatic   Neck:   Supple, symmetrical, trachea midline,        thyroid:  No enlargement/tenderness; no  JVD Unremarkable portable chest radiograph.     Dictated by (CST): Narcisa Sagastume MD on 1/30/2021 at 11:36 PM     Finalized by (CST): Narcisa Sagastume MD on 1/30/2021 at 11:37 PM       Ekg 12-lead    Result Date: 1/31/2021  Sinus rhythm with Premature atr status type     Hypokalemia     Transient cerebral ischemia     Transient cerebral ischemia, unspecified type     Hypernatremia     Anemia     Azotemia     Hyperglycemia     Acute cystitis with hematuria     Acute kidney injury (HCC)     Severe dehydration consult    Hypothyroidism–levothyroxine    Dementia–Aricept, Namenda    UTI–IV antibiotics, ID follow-up    Dyslipidemia–statin    Acute renal failure due to poor intake due to dehydration–IV fluids    History of thrombosis–was on Xarelto, currently on hol

## 2021-02-01 ENCOUNTER — APPOINTMENT (OUTPATIENT)
Dept: CT IMAGING | Facility: HOSPITAL | Age: 86
DRG: 987 | End: 2021-02-01
Attending: PHYSICIAN ASSISTANT
Payer: MEDICARE

## 2021-02-01 ENCOUNTER — APPOINTMENT (OUTPATIENT)
Dept: INTERVENTIONAL RADIOLOGY/VASCULAR | Facility: HOSPITAL | Age: 86
DRG: 987 | End: 2021-02-01
Attending: INTERNAL MEDICINE
Payer: MEDICARE

## 2021-02-01 LAB
ALBUMIN SERPL-MCNC: 2.1 G/DL (ref 3.4–5)
ALBUMIN/GLOB SERPL: 0.6 {RATIO} (ref 1–2)
ALP LIVER SERPL-CCNC: 61 U/L
ALT SERPL-CCNC: 9 U/L
ANION GAP SERPL CALC-SCNC: 8 MMOL/L (ref 0–18)
AST SERPL-CCNC: 20 U/L (ref 15–37)
ATRIAL RATE: 88 BPM
BASOPHILS # BLD AUTO: 0.05 X10(3) UL (ref 0–0.2)
BASOPHILS NFR BLD AUTO: 0.7 %
BILIRUB SERPL-MCNC: 0.3 MG/DL (ref 0.1–2)
BUN BLD-MCNC: 18 MG/DL (ref 7–18)
BUN/CREAT SERPL: 14.5 (ref 10–20)
CALCIUM BLD-MCNC: 8.7 MG/DL (ref 8.5–10.1)
CHLORIDE SERPL-SCNC: 110 MMOL/L (ref 98–112)
CO2 SERPL-SCNC: 21 MMOL/L (ref 21–32)
CREAT BLD-MCNC: 1.24 MG/DL
D-DIMER: 1.15 UG/ML FEU (ref ?–0.86)
DEPRECATED RDW RBC AUTO: 54.4 FL (ref 35.1–46.3)
EOSINOPHIL # BLD AUTO: 0.06 X10(3) UL (ref 0–0.7)
EOSINOPHIL NFR BLD AUTO: 0.8 %
ERYTHROCYTE [DISTWIDTH] IN BLOOD BY AUTOMATED COUNT: 15.6 % (ref 11–15)
GLOBULIN PLAS-MCNC: 3.6 G/DL (ref 2.8–4.4)
GLUCOSE BLD-MCNC: 69 MG/DL (ref 70–99)
GLUCOSE BLD-MCNC: 87 MG/DL (ref 70–99)
HCT VFR BLD AUTO: 23.1 %
HGB BLD-MCNC: 6.9 G/DL
HGB BLD-MCNC: 9.3 G/DL
IMM GRANULOCYTES # BLD AUTO: 0.03 X10(3) UL (ref 0–1)
IMM GRANULOCYTES NFR BLD: 0.4 %
INR BLD: 1.29 (ref 0.89–1.11)
LYMPHOCYTES # BLD AUTO: 1.75 X10(3) UL (ref 1–4)
LYMPHOCYTES NFR BLD AUTO: 24 %
M PROTEIN MFR SERPL ELPH: 5.7 G/DL (ref 6.4–8.2)
MCH RBC QN AUTO: 28.6 PG (ref 26–34)
MCHC RBC AUTO-ENTMCNC: 29.9 G/DL (ref 31–37)
MCV RBC AUTO: 95.9 FL
MONOCYTES # BLD AUTO: 0.45 X10(3) UL (ref 0.1–1)
MONOCYTES NFR BLD AUTO: 6.2 %
NEUTROPHILS # BLD AUTO: 4.96 X10 (3) UL (ref 1.5–7.7)
NEUTROPHILS # BLD AUTO: 4.96 X10(3) UL (ref 1.5–7.7)
NEUTROPHILS NFR BLD AUTO: 67.9 %
OSMOLALITY SERPL CALC.SUM OF ELEC: 288 MOSM/KG (ref 275–295)
P AXIS: 75 DEGREES
P-R INTERVAL: 144 MS
PLATELET # BLD AUTO: 234 10(3)UL (ref 150–450)
POTASSIUM SERPL-SCNC: 4 MMOL/L (ref 3.5–5.1)
PSA SERPL DL<=0.01 NG/ML-MCNC: 16.5 SECONDS (ref 12.4–14.6)
Q-T INTERVAL: 380 MS
QRS DURATION: 70 MS
QTC CALCULATION (BEZET): 459 MS
R AXIS: -4 DEGREES
RBC # BLD AUTO: 2.41 X10(6)UL
SODIUM SERPL-SCNC: 139 MMOL/L (ref 136–145)
T AXIS: 85 DEGREES
VENTRICULAR RATE: 88 BPM
WBC # BLD AUTO: 7.3 X10(3) UL (ref 4–11)

## 2021-02-01 PROCEDURE — 30233N0 TRANSFUSION OF AUTOLOGOUS RED BLOOD CELLS INTO PERIPHERAL VEIN, PERCUTANEOUS APPROACH: ICD-10-PCS | Performed by: STUDENT IN AN ORGANIZED HEALTH CARE EDUCATION/TRAINING PROGRAM

## 2021-02-01 PROCEDURE — 06H03DZ INSERTION OF INTRALUMINAL DEVICE INTO INFERIOR VENA CAVA, PERCUTANEOUS APPROACH: ICD-10-PCS | Performed by: RADIOLOGY

## 2021-02-01 PROCEDURE — B5191ZZ FLUOROSCOPY OF INFERIOR VENA CAVA USING LOW OSMOLAR CONTRAST: ICD-10-PCS | Performed by: RADIOLOGY

## 2021-02-01 PROCEDURE — 99233 SBSQ HOSP IP/OBS HIGH 50: CPT | Performed by: INTERNAL MEDICINE

## 2021-02-01 PROCEDURE — 74176 CT ABD & PELVIS W/O CONTRAST: CPT | Performed by: PHYSICIAN ASSISTANT

## 2021-02-01 RX ORDER — SODIUM CHLORIDE 9 MG/ML
INJECTION, SOLUTION INTRAVENOUS ONCE
Status: COMPLETED | OUTPATIENT
Start: 2021-02-01 | End: 2021-02-01

## 2021-02-01 RX ORDER — LIDOCAINE HYDROCHLORIDE 10 MG/ML
INJECTION, SOLUTION INFILTRATION; PERINEURAL
Status: COMPLETED
Start: 2021-02-01 | End: 2021-02-01

## 2021-02-01 RX ORDER — HEPARIN SODIUM 5000 [USP'U]/ML
INJECTION, SOLUTION INTRAVENOUS; SUBCUTANEOUS
Status: COMPLETED
Start: 2021-02-01 | End: 2021-02-01

## 2021-02-01 NOTE — PROGRESS NOTES
[facility]  Progress Note    Abraham Dunaway Patient Status:  Observation    1935 MRN [id number]   [facility] 4NW-A Attending Jaja Rubio MD   Hosp Day # 0 PCP Mary Hare MD         SUBJECTIVE:  Subjective:  Lino Medina 112   CO2 27.0 24.0   BUN 41* 25*   CREATSERUM 2.16* 1.39*   CA 8.5 8.4*   * 118*       Recent Labs   Lab 01/27/21  1335 01/30/21  2238 01/31/21  0701   ALT 12* 12*  --    AST 17 15  --    ALB 2.5* 2.3*  --    LDH  --   --  200       No results for Common, deep, and superficial femoral, popliteal, sapheno-femoral junction, and posterior tibial veins. PATIENT STATED HISTORY: (As transcribed by Technologist)     FINDINGS:  SAPHENOFEMORAL JUNCTION:  No reflux.  THROMBI:  Thrombus in the right popliteal sucrose  200 mg Intravenous Daily   • folic acid  1 mg Oral Daily     • sodium chloride 83 mL/hr at 01/31/21 2300     clonazePAM, bisacodyl, ondansetron HCl, acetaminophen       Assessment/Plan:   Patient Active Problem List:     Essential hypertension ANEMIA, UNSPECIFIED   I49.5 SICK SINUS SYNDROME TACHY-BRADYCARDIA SYNDROME   E03.9 HYPOTHYROIDISM, UNSPECIFIED   F41.9 ANXIETY DISORDER, UNSPECIFIED   F32.9 MAJOR DEPRESSIVE DISORDER, SINGLE EPISODE, UNSPECIFIED   Z96.0 PRESENCE OF UROGENITAL IMPLANTS   D6 AND TREAT  DISPOSITION PER      See tests ordered,  Available and radiology reviewed  All consultant notes reviewed  Discussed with nursing on floor  dvt prophylaxis reviewed  PT and/or OT  Shanelle Hubbard MD

## 2021-02-01 NOTE — PLAN OF CARE
Received pt in bed, mumbles and talking randomly, not in apparent distress, IVF completed, HGB 6.9, Dr. Ana Casarez ordered 1 unit PRBC, gluc serum from lab 69, by accu check is 87, pt is NPO, pt was fed by her son with 2 cups of  apple sauce, IR informed, pt is or drain site(s) healing without S/S of infection  Description: INTERVENTIONS:  - Assess and document risk factors for pressure ulcer development  - Assess and document skin integrity  - Assess and document dressing/incision, wound bed, drain sites and seferino

## 2021-02-01 NOTE — PLAN OF CARE
Pleasantly confused , in no distress. Mepilex changed to sacrum and dressings to bilat feet. Anusol suppository given. Took meds without difficulty, crushed in applesauce. purewick on , draining concentrated urine. .Temp 99.9

## 2021-02-01 NOTE — PROGRESS NOTES
[address] [facility] Gastroenterology     [facility] Patient Status:  Observation    1935 MRN [id number]   [facility] 4NW-A Attending Wilfred Arthur MD   Hosp Day # 0 PCP Nicole Kilpatrick MD Body mass index is 19.79 kg/m². Gen: NAD, confused; non communicative, calm laying in bed.    HEENT: no scleral icterus, MMM; neck supple  CV: RRR, extremities in lower extremity are warm b/l  Resp: CTAB, no respiratory distress  Abd: Soft, non-tender, n of constipation, that she likely had an anal outlet source.    No recent EGD/Colonoscopy    Per discussion with daughter and team during this admission, conservative measures given age and co-morbid conditions, unless there is evidence of active GI bleeding

## 2021-02-01 NOTE — PALLIATIVE CARE NOTE
Palliative consult received from Dr. Girish Curry. Chart reviewed - and attempted visit this AM, pt was off unit at imaging exam.  Note plans for IVC filter placement this afternoon/later today as per RN.     Will see patient tomorrow for consultation, and full

## 2021-02-01 NOTE — CONSULTS
[facility] Urology   Consultation Note    Imagene Levels Patient Status:  Observation    1935 MRN [id number]   [facility] 4NW-A Attending Lizzie Craig MD   Hosp Day # 0 PCP Jess Gutierrez MD     Reason for Consultation: creatinine has improved since right ureteral stent placement. This can be changed in 6 month's time.  -She had been voiding spontaneously, with low residuals.     History:  Past Medical History:   Diagnosis Date   • Anemia    • Deep vein thrombosis ([address] suppository 25 mg, 25 mg, Rectal, Nightly  •  iron sucrose (VENOFER) IV Push 200 mg, 200 mg, Intravenous, Daily  •  folic acid (FOLVITE) tab 1 mg, 1 mg, Oral, Daily    Review of Systems:  Review of systems not obtained due to patient factors.     Physical E without obstructive calculus identified. Calcified leiomyomatous uterus. Severe fecal stasis with large rectal fecaloma and circumferential mural thickening of the rectum suggestive of stercoral lithiasis. No acute bowel obstruction. No diverticulitis. imaging. RETROPERITONEUM:  No mass or adenopathy. BOWEL/MESENTERY:  The stomach, duodenum sweep and small bowel are unremarkable.   There is retained feces throughout the colon, including a large bolus of retained feces within the rectosigmoid vault me Cognitive impairment     Dementia, in, senility, with behavioral disturbance (HCC)     Hypothyroidism in adult     Cellulitis and abscess of leg     Thrombocytopenia (HCC)     Altered mental status     Acute encephalopathy     Physical deconditioning     A on the surface of this catheter. This could represent difficulty   with removal of the catheter, and is therefore noted.   2. This material along the pigtail component in the bladder could also cause dysfunction of the pigtail catheter, if it restricts the

## 2021-02-01 NOTE — H&P (VIEW-ONLY)
[facility] Urology   Consultation Note    Becki Vaca Patient Status:  Observation    1935 MRN [id number]   [facility] 4NW-A Attending Jimena Holloway MD   Hosp Day # 0 PCP May Negron MD     Reason for Consultation: creatinine has improved since right ureteral stent placement. This can be changed in 6 month's time.  -She had been voiding spontaneously, with low residuals.     History:  Past Medical History:   Diagnosis Date   • Anemia    • Deep vein thrombosis ([address] suppository 25 mg, 25 mg, Rectal, Nightly  •  iron sucrose (VENOFER) IV Push 200 mg, 200 mg, Intravenous, Daily  •  folic acid (FOLVITE) tab 1 mg, 1 mg, Oral, Daily    Review of Systems:  Review of systems not obtained due to patient factors.     Physical E without obstructive calculus identified. Calcified leiomyomatous uterus. Severe fecal stasis with large rectal fecaloma and circumferential mural thickening of the rectum suggestive of stercoral lithiasis. No acute bowel obstruction. No diverticulitis. imaging. RETROPERITONEUM:  No mass or adenopathy. BOWEL/MESENTERY:  The stomach, duodenum sweep and small bowel are unremarkable.   There is retained feces throughout the colon, including a large bolus of retained feces within the rectosigmoid vault me Cognitive impairment     Dementia, in, senility, with behavioral disturbance (HCC)     Hypothyroidism in adult     Cellulitis and abscess of leg     Thrombocytopenia (HCC)     Altered mental status     Acute encephalopathy     Physical deconditioning     A on the surface of this catheter. This could represent difficulty   with removal of the catheter, and is therefore noted.   2. This material along the pigtail component in the bladder could also cause dysfunction of the pigtail catheter, if it restricts the

## 2021-02-01 NOTE — CONSULTS
INFECTIOUS DISEASE CONSULTATION    [facility] [place] [place] Patient Status:  Observation    1935 MRN [id number]   [facility] 4NW-A Attending Antonio Carballo MD   Hosp Day # 0 PCP Parvez Coyne 40 mg, 40 mg, Oral, QAM AC  •  bisacodyl (DULCOLAX) rectal suppository 10 mg, 10 mg, Rectal, Q24H PRN  •  ondansetron HCl (ZOFRAN) injection 4 mg, 4 mg, Intravenous, Q6H PRN  •  acetaminophen (TYLENOL) tab 650 mg, 650 mg, Oral, Q6H PRN  •  PEG 3350 (MIRALA comprehensive 10 point review of systems was completed. Pertinent positives and negatives noted in the the HPI.       Physical Exam:    Resting in no distress  Frail, thin stature  Vital signs: Temp:  [97.4 °F (36.3 °C)-99.9 °F (37.7 °C)] 98.9 °F (37.2 °C) >50*   RBCUR >10*   BACUR Rare*   EPIUR None Seen       COVID-19 Lab Results    COVID-19  Lab Results   Component Value Date    COVID19 Detected (A) 12/28/2020    COVID19 Detected (A) 12/27/2020    COVID19 Not Detected 12/22/2020       Pro-Calcitonin  No r exposure within 3 months of infection  -dc isolation    2.  Anemia, transfuse per attending    3. UA abnormal, contaminated, can follow off abx        Alvin De La Cruz MD  [address] [place] [address]  [phone]

## 2021-02-02 PROBLEM — Z51.5 PALLIATIVE CARE ENCOUNTER: Status: ACTIVE | Noted: 2021-02-02

## 2021-02-02 LAB
BASOPHILS # BLD AUTO: 0.04 X10(3) UL (ref 0–0.2)
BASOPHILS NFR BLD AUTO: 0.5 %
DEPRECATED RDW RBC AUTO: 56.5 FL (ref 35.1–46.3)
EOSINOPHIL # BLD AUTO: 0.1 X10(3) UL (ref 0–0.7)
EOSINOPHIL NFR BLD AUTO: 1.2 %
ERYTHROCYTE [DISTWIDTH] IN BLOOD BY AUTOMATED COUNT: 15.1 % (ref 11–15)
HCT VFR BLD AUTO: 31.1 %
HGB BLD-MCNC: 8.9 G/DL
IMM GRANULOCYTES # BLD AUTO: 0.06 X10(3) UL (ref 0–1)
IMM GRANULOCYTES NFR BLD: 0.7 %
LYMPHOCYTES # BLD AUTO: 1.9 X10(3) UL (ref 1–4)
LYMPHOCYTES NFR BLD AUTO: 23.5 %
MCH RBC QN AUTO: 29.6 PG (ref 26–34)
MCHC RBC AUTO-ENTMCNC: 28.6 G/DL (ref 31–37)
MCV RBC AUTO: 103.3 FL
MONOCYTES # BLD AUTO: 0.69 X10(3) UL (ref 0.1–1)
MONOCYTES NFR BLD AUTO: 8.6 %
NEUTROPHILS # BLD AUTO: 5.28 X10 (3) UL (ref 1.5–7.7)
NEUTROPHILS # BLD AUTO: 5.28 X10(3) UL (ref 1.5–7.7)
NEUTROPHILS NFR BLD AUTO: 65.5 %
PLATELET # BLD AUTO: 163 10(3)UL (ref 150–450)
RBC # BLD AUTO: 3.01 X10(6)UL
WBC # BLD AUTO: 8.1 X10(3) UL (ref 4–11)

## 2021-02-02 PROCEDURE — 99222 1ST HOSP IP/OBS MODERATE 55: CPT | Performed by: CLINICAL NURSE SPECIALIST

## 2021-02-02 PROCEDURE — 99232 SBSQ HOSP IP/OBS MODERATE 35: CPT | Performed by: INTERNAL MEDICINE

## 2021-02-02 RX ORDER — DEXTROSE AND SODIUM CHLORIDE 5; .45 G/100ML; G/100ML
INJECTION, SOLUTION INTRAVENOUS CONTINUOUS
Status: DISCONTINUED | OUTPATIENT
Start: 2021-02-02 | End: 2021-02-04

## 2021-02-02 NOTE — CM/SW NOTE
Updates sent to [facility] in [address].

## 2021-02-02 NOTE — PROGRESS NOTES
THE [facility] Hematology and Oncology Progress Note   Length of Stay: 1    Subjective: IVC filter placed. Hb stable at 8.9. No Leg swelling.      ROS: 12 Point ROS completed and pertinent positives are above     Objective:   • atorvastatin  20 mg Oral Nightly   • and anemia presented from assisted living with anemia.      History of RLE DVT: Dx 2/4/20, likely 2/2 immobility   - Per daughter, she did not start Xarelto initially due to hematuria.  But given worsening leg swelling, she was started on Xarelto knowing th

## 2021-02-02 NOTE — PROGRESS NOTES
[facility]  Progress Note    Alejandra Yu Patient Status:  Observation    1935 MRN [id number]   [facility] 4NW-A Attending Amanda Huntley MD   Hosp Day # 1 PCP Vipul Duron MD         SUBJECTIVE:  Subjective:  Steven Ferreira management, follow-up to in-house wound team/nursing as wound care is beyond the realm of my speciality                                      Data Review:       Labs:     Recent Labs   Lab 01/27/21  1335 01/30/21  2238 01/31/21  0701 02/01/21  1037 02/01/21 Gas and stool in the colon with gas filled bowel loops. Multiple calcifications to the left of L4. Right ureteral stent.     Dictated by (CST): Tripp Woods MD on 1/31/2021 at 2:41 PM     Finalized by (CST): Tripp Woods MD on 1/31/2021 at 2:42 PM via EMS from [facility] for hemoglobin of 6.3. Patient  confused, COVID +. FINDINGS: Cardiac silhouette and pulmonary vasculature are unremarkable. No consolidation, pleural effusion or pneumothorax.  IMPRESSION: Unremarkable portable chest radio COVID-19     Anemia, unspecified type     Gastrointestinal hemorrhage, unspecified gastrointestinal hemorrhage type     Dehydration     Chronic deep vein thrombosis (DVT) of proximal vein of right lower extremity (HCC)     Folic acid deficiency     Acute d thinners, GI consult, monitor H&H  Agree with GI opinion that–conservative measures for now given age, co-morbid conditions unless there are signs of active GI bleeding  -CBC w/ plts, INR  -Maintain T/S, transfuse pRBC if Hg < 7;      Dehydration  Due to p Eliquis 2.5 twice daily only if painful right lower extremity DVT otherwise off anticoagulation due to bleeding issues    GERD–pantoprazole     DVT prophylaxis–will avoid blood thinners due to possible GI bleed, SCDs for now      Palliative care for goals

## 2021-02-02 NOTE — CONSULTS
Chiquita [address]  [id number]  Hospital Day #1  Date of Consult: 02/02/21  Patient seen at: [facility]    Reason for Consultation:      Consult requested by Dr. Citlalli Lino for evaluation of palli ondansetron HCl, 4 mg, Q6H PRN    •  acetaminophen, 650 mg, Q6H PRN         Social History:      Marital Status:    Children: Myesha Larkin and Claudeen Reiter  Living Situation Prior to Hospitalization: patient has lived in assistive living from 2017 to December 20 PRN    •  ondansetron HCl (ZOFRAN) injection 4 mg, 4 mg, Intravenous, Q6H PRN    •  acetaminophen (TYLENOL) tab 650 mg, 650 mg, Oral, Q6H PRN    •  PEG 3350 (MIRALAX) powder packet 17 g, 17 g, Oral, Daily    •  Hydrocortisone Acetate (ANUSOL-HC) suppositor component in the bladder could also cause dysfunction of the pigtail catheter, if it restricts the passage of urine through the catheter, but the hydronephrosis on the right actually appears improved compared with September 2020.  3.  The right hydronephros Disease  Can't do any work Mainly Assist Normal or Reduced Full or confused   30 Bedbound Extensive Disease  Can't do any work Max Assist  Total Care Reduced Drowsy/confused   20 Bedbound Extensive Disease  Can't do any work Max Assist  Total Care Minimal was unable to provide \"simple things\" like IV fluids without a trip to the hospital.  For this reason, the decision was made to transfer her to the ECF. We discussed the concern regarding trajectory of disease process and current wishes.  We discusse and transferring her to [facility]) their mother has continued to deteriorate and they are uncertain whether this is natural disease progression or due to lack of staff effort. They are also frustrated by increasingly complex medical needs.   They have agreed to a encephalopathy     Physical deconditioning     Altered mental status, unspecified altered mental status type     Hypokalemia     Transient cerebral ischemia     Transient cerebral ischemia, unspecified type     Hypernatremia     Anemia     Azotemia     Hyp [facility]. A total of 50 minutes were spent on this consult; >50% was spent counseling and coordinating care. Thank you for inviting Palliative Care Service to participate in the care of Hedy Romel and family.       As goals of care hav

## 2021-02-02 NOTE — PHYSICAL THERAPY NOTE
PT consult received. Per RN, pt is bedbound, contracted and does not follow commands. She is having difficulty eating. Skilled IPPT is not indicated. Will sign off. RN in agreement.

## 2021-02-02 NOTE — PROGRESS NOTES
[facility]  Urology Progress Note    Keena Miller Patient Status:  Inpatient    1935 MRN [id number]   [facility] 4NW-A Attending Yudi Gunter MD   Hosp Day # 1 PCP Chaz Fraire MD     Subjective:  Keena Miller is a(n) now high-density material lining the surface of the urinary bladder component of the pigtail catheter, appearing as a concretion of stone-like material on the surface of this catheter.     DVT  -s/p IVC filter 2/1/21  Anemia  bright red blood per rectum on

## 2021-02-02 NOTE — CONSULTS
[facility]  Inpatient Wound Care Contact Note    Shannon Gutierrez Patient Status:  Inpatient    1935 MRN [id number]   [facility] 4NW-A Attending Nelson Ford MD   Hosp Day # 1 PCP Jenn Kyle MD     Attempted to see patient f

## 2021-02-02 NOTE — CONSULTS
[facility]  Report of Inpatient Wound Care Consultation     Deloris Millan Patient Status:  Inpatient    1935 MRN [id number]   [facility] 4NW-A Attending Vane Shea MD   Hosp Day # 1 PCP Jhon Cain MD     [facility] --   --   --   --    PGLU  --   --   --   --  80  --   --        Imaging:   See EMR  Microbiology:   See EMR    ASSESSMENT/ RECOMMENDATIONS:  Received verbal order from [address] To Dao Chino PT, MPT for \"Physical Therapy wound care evaluate and treat. \"

## 2021-02-02 NOTE — PROGRESS NOTES
[facility] Gastroenterology     Elena De Jesus Patient Status:  Observation    1935 MRN [id number]   [facility] 4NW-A Attending Angelic Betacnourt MD   Hosp Day # 1 PCP Anders Tamayo MD Pulse 60   Temp 97.8 °F (36.6 °C) (Temporal)   Resp 12   Ht 5' 2\" (1.575 m)   Wt 108 lb 3.2 oz (49.1 kg)   SpO2 97%   Breastfeeding No   BMI 19.79 kg/m²   Body mass index is 19.79 kg/m². Gen: NAD, confused; non communicative, calm laying in bed.    HEENT NAUSEA ONLY    Imaging:  I have personally reviewed all pertinent available imaging.        ASSESSMENT / PLAN:     Becki Vaca is a 80year old female with a history of dementia (calm, confused in bed) admitted with anemia, low Hg and hematochezia with

## 2021-02-02 NOTE — PLAN OF CARE
Pt is ao to self on room air and not tele . Pt vtials has been stable pt had low grade temp that corrected itself . Pt tolerated meds with applesauce. Pt did received one units of blood pt tolerated that well. Will continue to monitor pt during this shift. integrity  - Assess and document dressing/incision, wound bed, drain sites and surrounding tissue  - Implement wound care per orders  - Initiate isolation precautions as appropriate  - Initiate Pressure Ulcer prevention bundle as indicated  Outcome: Silvestre

## 2021-02-02 NOTE — CM/SW NOTE
02/02/21 1000   CM/SW Referral Data   Referral Source Physician   Reason for Referral Discharge planning   Informant Other  (chart review)   Patient Info   Patient's [address] [facility] [facility]   Pt noted to be fro

## 2021-02-03 ENCOUNTER — ANESTHESIA EVENT (OUTPATIENT)
Dept: SURGERY | Facility: HOSPITAL | Age: 86
DRG: 987 | End: 2021-02-03
Payer: MEDICARE

## 2021-02-03 LAB
ALBUMIN SERPL-MCNC: 1.9 G/DL (ref 3.4–5)
ALBUMIN/GLOB SERPL: 0.5 {RATIO} (ref 1–2)
ALP LIVER SERPL-CCNC: 56 U/L
ALT SERPL-CCNC: 7 U/L
ANION GAP SERPL CALC-SCNC: 5 MMOL/L (ref 0–18)
AST SERPL-CCNC: 9 U/L (ref 15–37)
BASOPHILS # BLD AUTO: 0.03 X10(3) UL (ref 0–0.2)
BASOPHILS NFR BLD AUTO: 0.4 %
BILIRUB SERPL-MCNC: 0.2 MG/DL (ref 0.1–2)
BLOOD TYPE BARCODE: 6200
BUN BLD-MCNC: 14 MG/DL (ref 7–18)
BUN/CREAT SERPL: 14.1 (ref 10–20)
CALCIUM BLD-MCNC: 7.6 MG/DL (ref 8.5–10.1)
CHLORIDE SERPL-SCNC: 108 MMOL/L (ref 98–112)
CO2 SERPL-SCNC: 25 MMOL/L (ref 21–32)
COPPER, SERUM: 125.9 UG/DL
CREAT BLD-MCNC: 0.99 MG/DL
DEPRECATED RDW RBC AUTO: 50.6 FL (ref 35.1–46.3)
EOSINOPHIL # BLD AUTO: 0.15 X10(3) UL (ref 0–0.7)
EOSINOPHIL NFR BLD AUTO: 2.1 %
ERYTHROCYTE [DISTWIDTH] IN BLOOD BY AUTOMATED COUNT: 14.9 % (ref 11–15)
GLOBULIN PLAS-MCNC: 3.5 G/DL (ref 2.8–4.4)
GLUCOSE BLD-MCNC: 128 MG/DL (ref 70–99)
HCT VFR BLD AUTO: 26.5 %
HGB BLD-MCNC: 8.4 G/DL
IMM GRANULOCYTES # BLD AUTO: 0.04 X10(3) UL (ref 0–1)
IMM GRANULOCYTES NFR BLD: 0.6 %
LYMPHOCYTES # BLD AUTO: 1.74 X10(3) UL (ref 1–4)
LYMPHOCYTES NFR BLD AUTO: 24.8 %
M PROTEIN MFR SERPL ELPH: 5.4 G/DL (ref 6.4–8.2)
MCH RBC QN AUTO: 29.7 PG (ref 26–34)
MCHC RBC AUTO-ENTMCNC: 31.7 G/DL (ref 31–37)
MCV RBC AUTO: 93.6 FL
MONOCYTES # BLD AUTO: 0.62 X10(3) UL (ref 0.1–1)
MONOCYTES NFR BLD AUTO: 8.8 %
NEUTROPHILS # BLD AUTO: 4.44 X10 (3) UL (ref 1.5–7.7)
NEUTROPHILS # BLD AUTO: 4.44 X10(3) UL (ref 1.5–7.7)
NEUTROPHILS NFR BLD AUTO: 63.3 %
OSMOLALITY SERPL CALC.SUM OF ELEC: 288 MOSM/KG (ref 275–295)
PLATELET # BLD AUTO: 195 10(3)UL (ref 150–450)
POTASSIUM SERPL-SCNC: 3.3 MMOL/L (ref 3.5–5.1)
RBC # BLD AUTO: 2.83 X10(6)UL
SODIUM SERPL-SCNC: 138 MMOL/L (ref 136–145)
WBC # BLD AUTO: 7 X10(3) UL (ref 4–11)

## 2021-02-03 PROCEDURE — 99232 SBSQ HOSP IP/OBS MODERATE 35: CPT | Performed by: INTERNAL MEDICINE

## 2021-02-03 NOTE — ANESTHESIA PREPROCEDURE EVALUATION
PRE-OP EVALUATION    Patient Name: Alejandra Yu    Pre-op Diagnosis: INPT    Procedure(s):  CYSTOSCOPY, RIGHT RETROGRADE PYELOGRAM, LASER LITHOTRIPSY OF INCRUSTED STENT, RIGHT URETEROSCOPY, EXCHANGE RIGHT URETERAL STENT    Surgeon(s) and Role:     * Un (MIRALAX) powder packet 17 g, 17 g, Oral, Daily    •  Hydrocortisone Acetate (ANUSOL-HC) suppository 25 mg, 25 mg, Rectal, Nightly    •  iron sucrose (VENOFER) IV Push 200 mg, 200 mg, Intravenous, Daily    •  folic acid (FOLVITE) tab 1 mg, 1 mg, Oral, Shaun Ciprofloxacin Hcl      Anesthesia Evaluation    Patient summary reviewed. Anesthetic Complications           GI/Hepatic/Renal    Negative GI/hepatic/renal ROS. Cardiovascular      ECG reviewed.   Exercise tolerance: poor care planning and goals of care          Past Surgical History:   Procedure Laterality Date   • CYSTOSCOPY,INSERT URETERAL STENT       Social History    Tobacco Use      Smoking status: Former Smoker        Types: Cigarettes        Quit date: 6/25/1983

## 2021-02-03 NOTE — CONSULTS
[facility] Dosing Service  Antibiotic Dosing    Ginette Holloway is a 80year old for whom pharmacy is dosing cefazolin for treatment of  surgical prophylaxis. .  Other antibiotics (Not dosed by pharmacy): fluconazole prophylactic dose (on PTA).     Valerie Anand

## 2021-02-03 NOTE — PROGRESS NOTES
THE [facility] Hematology and Oncology Progress Note   Length of Stay: 2    Subjective: Plan for stent exchange with urology tomorrow. Hb is stable at 8.4 No calf pain. Vitals are stable.      ROS: 12 Point ROS completed and pertinent positives are above     Object PMH of RLE DVT (dx 02/04/20 on Xarelto 20 mg), Dementia, Right Hydronephrosis s/p ureteral stent, Sacral wound, COVID 19 (12/27/20) and anemia presented from assisted living with anemia.      History of RLE DVT: Dx 2/4/20, likely 2/2 immobility   - Per maged

## 2021-02-03 NOTE — CM/SW NOTE
BPCI-Advanced Medicare Program Note:  Plan of care reviewed for care coordination and discharge planning. Noted pt falls under  BPCI/Medicare program, with  for Gastrointestinal Hemorrhage. [address] recommendations for is home, pending pt progress.  Pt i

## 2021-02-03 NOTE — PLAN OF CARE
Patient received alert to self, unable to follow commands, all needs anticipated and met by staff. She was repositioned throughout shift as per facility policy.      Po medication taken with ss of aspiration, she rested uneventfully throughout the night document skin integrity  - Assess and document dressing/incision, wound bed, drain sites and surrounding tissue  - Implement wound care per orders  - Initiate isolation precautions as appropriate  - Initiate Pressure Ulcer prevention bundle as indicated  O

## 2021-02-03 NOTE — PROGRESS NOTES
[facility]  Progress Note    Vinny Cancer Patient Status:  Observation    1935 MRN [id number]   [facility] 4NW-A Attending Joann Vargas MD   Hosp Day # 2 PCP Disha Oliveira MD         SUBJECTIVE:  Subjective:  Mellisa Cano CREATSERUM 2.16* 1.39* 1.24* 0.99   CA 8.5 8.4* 8.7 7.6*   * 118* 69* 128*       Recent Labs   Lab 01/27/21  1335 01/30/21  2238 01/31/21  0701 02/01/21  1037 02/03/21  0727   ALT 12* 12*  --  9* 7*   AST 17 15  --  20 9*   ALB 2.5* 2.3*  --  2.1* grey scale, and duplex ultrasound was used to evaluate the lower extremity venous system. B-mode two-dimensional images of the vascular structures, Doppler spectral analysis, and color flow. Doppler imaging were performed.   The following veins were imaged Donepezil HCl  10 mg Oral Nightly   • escitalopram  10 mg Oral Daily   • [START ON 2/4/2021] fluconazole  150 mg Oral Weekly   • Levothyroxine Sodium  50 mcg Oral Before breakfast   • Memantine HCl  10 mg Oral BID   • Pantoprazole Sodium  40 mg Oral QA AC goals of care    Principal Problem:    Anemia, unspecified type  Active Problems:    Anemia    Gastrointestinal hemorrhage, unspecified gastrointestinal hemorrhage type    Dehydration    Chronic deep vein thrombosis (DVT) of proximal vein of right lower ex mouth intake due to advanced dementia, needed IV fluids at the nursing home, IV fluids To continue, monitor BUN and creatinine, calorie count, nutritionist consult, palliative care for goals of care      Dysphagia–diet per speech therapist    hx pressure w care, Our practice–nurse practitioner Brady Weber discussed with family and they are in agreement with palliative care consult    WEAKNESS- PT OT EVAL AND TREAT  DISPOSITION PER      See tests ordered,  Available and radiology reviewed  All co

## 2021-02-03 NOTE — PROGRESS NOTES
[facility]  Urology Progress Note    Elena De Jesus Patient Status:  Inpatient    1935 MRN [id number]   [facility] 4NW-A Attending Angelic Betancourt MD   Hosp Day # 2 PCP Anders Tamayo MD     Subjective:  Elena De Jesus is a(n) notified that her DNAR status would temporarily be rescinded for surgery. Daughter understands. Daughter also informed of new time for surgery tomorrow.       NPO after MN  Consent to be signed  Start Ancef - pharmacy to dose    Above discussed with nurse

## 2021-02-03 NOTE — SLP NOTE
SPEECH DAILY NOTE - INPATIENT    ASSESSMENT & PLAN   ASSESSMENT  Pt seen for dysphagia tx to assess tolerance with recommended diet, ensure proper utilization of aspiration precautions and provide pt/family education. Patient alert and reclined in bed.   Tejas Kline of 2    If you have any questions, please contact Socorro Birmingham MS CCC-SLP  Pager [phone]    Prior to entering room, SLP donned appropriate PPE for Patient level of isolation including gown, surgical mask, gloves, and eyewear.  Patient was

## 2021-02-03 NOTE — PLAN OF CARE
Pt. Alert to self only; confused, unable to follow commands at times. VSS. Afebrile. Able to take medications crushed in applesauce without difficulty. Pureed diet, nectar thick liquids; pt. Is an order and feed. Good appetite.  Wound care provided by Wound care as needed  Outcome: Progressing     Problem: SKIN/TISSUE INTEGRITY - ADULT  Goal: Incision(s), wounds(s) or drain site(s) healing without S/S of infection  Description: INTERVENTIONS:  - Assess and document risk factors for pressure ulcer development

## 2021-02-03 NOTE — OCCUPATIONAL THERAPY NOTE
OT eval and treat orders received via functional mobility screening. Per chart review and RN, patient is dependent with all self-care and receives 24 hour assist by LTC staff members. Patient not medically appropriate for skilled OT intervention.  Will si

## 2021-02-04 ENCOUNTER — ANESTHESIA (OUTPATIENT)
Dept: SURGERY | Facility: HOSPITAL | Age: 86
DRG: 987 | End: 2021-02-04
Payer: MEDICARE

## 2021-02-04 ENCOUNTER — APPOINTMENT (OUTPATIENT)
Dept: GENERAL RADIOLOGY | Facility: HOSPITAL | Age: 86
DRG: 987 | End: 2021-02-04
Attending: INTERNAL MEDICINE
Payer: MEDICARE

## 2021-02-04 LAB
ALBUMIN SERPL-MCNC: 1.9 G/DL (ref 3.4–5)
ALBUMIN/GLOB SERPL: 0.5 {RATIO} (ref 1–2)
ALP LIVER SERPL-CCNC: 57 U/L
ALT SERPL-CCNC: <6 U/L
ANION GAP SERPL CALC-SCNC: 5 MMOL/L (ref 0–18)
AST SERPL-CCNC: 10 U/L (ref 15–37)
BASOPHILS # BLD AUTO: 0.04 X10(3) UL (ref 0–0.2)
BASOPHILS NFR BLD AUTO: 0.6 %
BILIRUB SERPL-MCNC: 0.2 MG/DL (ref 0.1–2)
BUN BLD-MCNC: 13 MG/DL (ref 7–18)
BUN/CREAT SERPL: 13.7 (ref 10–20)
CALCIUM BLD-MCNC: 7.7 MG/DL (ref 8.5–10.1)
CHLORIDE SERPL-SCNC: 112 MMOL/L (ref 98–112)
CO2 SERPL-SCNC: 23 MMOL/L (ref 21–32)
CREAT BLD-MCNC: 0.95 MG/DL
DEPRECATED RDW RBC AUTO: 52.3 FL (ref 35.1–46.3)
EOSINOPHIL # BLD AUTO: 0.16 X10(3) UL (ref 0–0.7)
EOSINOPHIL NFR BLD AUTO: 2.6 %
ERYTHROCYTE [DISTWIDTH] IN BLOOD BY AUTOMATED COUNT: 15.4 % (ref 11–15)
GLOBULIN PLAS-MCNC: 3.5 G/DL (ref 2.8–4.4)
GLUCOSE BLD-MCNC: 111 MG/DL (ref 70–99)
HCT VFR BLD AUTO: 27.9 %
HGB BLD-MCNC: 8.7 G/DL
IMM GRANULOCYTES # BLD AUTO: 0.05 X10(3) UL (ref 0–1)
IMM GRANULOCYTES NFR BLD: 0.8 %
LYMPHOCYTES # BLD AUTO: 1.53 X10(3) UL (ref 1–4)
LYMPHOCYTES NFR BLD AUTO: 24.6 %
M PROTEIN MFR SERPL ELPH: 5.4 G/DL (ref 6.4–8.2)
MCH RBC QN AUTO: 29.6 PG (ref 26–34)
MCHC RBC AUTO-ENTMCNC: 31.2 G/DL (ref 31–37)
MCV RBC AUTO: 94.9 FL
MONOCYTES # BLD AUTO: 0.5 X10(3) UL (ref 0.1–1)
MONOCYTES NFR BLD AUTO: 8.1 %
NEUTROPHILS # BLD AUTO: 3.93 X10 (3) UL (ref 1.5–7.7)
NEUTROPHILS # BLD AUTO: 3.93 X10(3) UL (ref 1.5–7.7)
NEUTROPHILS NFR BLD AUTO: 63.3 %
OSMOLALITY SERPL CALC.SUM OF ELEC: 291 MOSM/KG (ref 275–295)
PLATELET # BLD AUTO: 186 10(3)UL (ref 150–450)
POTASSIUM SERPL-SCNC: 4.2 MMOL/L (ref 3.5–5.1)
RBC # BLD AUTO: 2.94 X10(6)UL
SODIUM SERPL-SCNC: 140 MMOL/L (ref 136–145)
WBC # BLD AUTO: 6.2 X10(3) UL (ref 4–11)

## 2021-02-04 PROCEDURE — 0TP98DZ REMOVAL OF INTRALUMINAL DEVICE FROM URETER, VIA NATURAL OR ARTIFICIAL OPENING ENDOSCOPIC: ICD-10-PCS | Performed by: UROLOGY

## 2021-02-04 PROCEDURE — 0TCB8ZZ EXTIRPATION OF MATTER FROM BLADDER, VIA NATURAL OR ARTIFICIAL OPENING ENDOSCOPIC: ICD-10-PCS | Performed by: UROLOGY

## 2021-02-04 PROCEDURE — 99232 SBSQ HOSP IP/OBS MODERATE 35: CPT | Performed by: INTERNAL MEDICINE

## 2021-02-04 PROCEDURE — 0TC68ZZ EXTIRPATION OF MATTER FROM RIGHT URETER, VIA NATURAL OR ARTIFICIAL OPENING ENDOSCOPIC: ICD-10-PCS | Performed by: UROLOGY

## 2021-02-04 PROCEDURE — 0T768ZZ DILATION OF RIGHT URETER, VIA NATURAL OR ARTIFICIAL OPENING ENDOSCOPIC: ICD-10-PCS | Performed by: UROLOGY

## 2021-02-04 PROCEDURE — BT1D1ZZ FLUOROSCOPY OF RIGHT KIDNEY, URETER AND BLADDER USING LOW OSMOLAR CONTRAST: ICD-10-PCS | Performed by: UROLOGY

## 2021-02-04 RX ORDER — ONDANSETRON 2 MG/ML
INJECTION INTRAMUSCULAR; INTRAVENOUS AS NEEDED
Status: DISCONTINUED | OUTPATIENT
Start: 2021-02-04 | End: 2021-02-04 | Stop reason: SURG

## 2021-02-04 RX ORDER — LIDOCAINE HYDROCHLORIDE 10 MG/ML
INJECTION, SOLUTION EPIDURAL; INFILTRATION; INTRACAUDAL; PERINEURAL AS NEEDED
Status: DISCONTINUED | OUTPATIENT
Start: 2021-02-04 | End: 2021-02-04 | Stop reason: SURG

## 2021-02-04 RX ORDER — SODIUM CHLORIDE 9 MG/ML
INJECTION, SOLUTION INTRAVENOUS CONTINUOUS
Status: DISCONTINUED | OUTPATIENT
Start: 2021-02-04 | End: 2021-02-08

## 2021-02-04 RX ORDER — EPHEDRINE SULFATE 50 MG/ML
INJECTION INTRAVENOUS AS NEEDED
Status: DISCONTINUED | OUTPATIENT
Start: 2021-02-04 | End: 2021-02-04 | Stop reason: SURG

## 2021-02-04 RX ORDER — SODIUM CHLORIDE 9 MG/ML
INJECTION, SOLUTION INTRAVENOUS ONCE
Status: COMPLETED | OUTPATIENT
Start: 2021-02-04 | End: 2021-02-04

## 2021-02-04 RX ORDER — HYDROMORPHONE HYDROCHLORIDE 1 MG/ML
0.4 INJECTION, SOLUTION INTRAMUSCULAR; INTRAVENOUS; SUBCUTANEOUS EVERY 5 MIN PRN
Status: DISCONTINUED | OUTPATIENT
Start: 2021-02-04 | End: 2021-02-04 | Stop reason: HOSPADM

## 2021-02-04 RX ORDER — SODIUM CHLORIDE 9 MG/ML
INJECTION, SOLUTION INTRAVENOUS CONTINUOUS PRN
Status: DISCONTINUED | OUTPATIENT
Start: 2021-02-04 | End: 2021-02-04 | Stop reason: SURG

## 2021-02-04 RX ORDER — NALOXONE HYDROCHLORIDE 0.4 MG/ML
80 INJECTION, SOLUTION INTRAMUSCULAR; INTRAVENOUS; SUBCUTANEOUS AS NEEDED
Status: DISCONTINUED | OUTPATIENT
Start: 2021-02-04 | End: 2021-02-04 | Stop reason: HOSPADM

## 2021-02-04 RX ORDER — SODIUM CHLORIDE, SODIUM LACTATE, POTASSIUM CHLORIDE, CALCIUM CHLORIDE 600; 310; 30; 20 MG/100ML; MG/100ML; MG/100ML; MG/100ML
INJECTION, SOLUTION INTRAVENOUS CONTINUOUS
Status: DISCONTINUED | OUTPATIENT
Start: 2021-02-04 | End: 2021-02-04 | Stop reason: HOSPADM

## 2021-02-04 RX ORDER — ASCORBIC ACID 500 MG
500 TABLET ORAL 3 TIMES DAILY
Qty: 90 TABLET | Refills: 0 | Status: SHIPPED | OUTPATIENT
Start: 2021-02-04 | End: 2021-02-08

## 2021-02-04 RX ORDER — LIDOCAINE HYDROCHLORIDE 20 MG/ML
JELLY TOPICAL AS NEEDED
Status: DISCONTINUED | OUTPATIENT
Start: 2021-02-04 | End: 2021-02-04 | Stop reason: HOSPADM

## 2021-02-04 RX ORDER — ACETAMINOPHEN 325 MG/1
650 TABLET ORAL EVERY 6 HOURS PRN
Status: DISCONTINUED | OUTPATIENT
Start: 2021-02-04 | End: 2021-02-08

## 2021-02-04 RX ADMIN — LIDOCAINE HYDROCHLORIDE 30 MG: 10 INJECTION, SOLUTION EPIDURAL; INFILTRATION; INTRACAUDAL; PERINEURAL at 08:44:00

## 2021-02-04 RX ADMIN — EPHEDRINE SULFATE 10 MG: 50 INJECTION INTRAVENOUS at 09:00:00

## 2021-02-04 RX ADMIN — ONDANSETRON 4 MG: 2 INJECTION INTRAMUSCULAR; INTRAVENOUS at 09:32:00

## 2021-02-04 RX ADMIN — SODIUM CHLORIDE: 9 INJECTION, SOLUTION INTRAVENOUS at 09:47:00

## 2021-02-04 RX ADMIN — SODIUM CHLORIDE: 9 INJECTION, SOLUTION INTRAVENOUS at 08:37:00

## 2021-02-04 NOTE — OPERATIVE REPORT
[facility]    PATIENT'S NAME: Eloymerle Mukesh   ATTENDING PHYSICIAN: Shanelle Hubbard M.D. OPERATING PHYSICIAN: Lou Tse M.D.    PATIENT ACCOUNT#:   [de-identified]    LOCATION:  [address] [place] [address]  MEDICAL RECORD #:   [id number]       DATE OF BIRTH:  [birth year]/ stone obstruction. They would like to proceed. OPERATIVE TECHNIQUE:  The patient was brought to the operating room after she was correctly identified and surgical consent was obtained. All questions were answered.   General anesthesia was induced witho and transferred to the recovery room in stable condition. I was present and performed the entire procedure as stated above. There were no complications. We will keep her on some perioperative antibiotics.   She can be discharged back to her skilled nursi

## 2021-02-04 NOTE — PLAN OF CARE
Alert to self. Answers simple questions at times. Contracted. Bunny boots in place. RA. No respiratory distress noted. NSR on tele. K replaced per [facility] protocol. Aspiration precautions. No s/s of pain noted. Repositioned in bed per staff.  NPO after midnight (90 degrees preferred)  - Offer food and liquids at a slow rate  - No straws  - Encourage small bites of food and small sips of liquid  - Offer pills one at a time, crush or deliver with applesauce as needed  - Discontinue feeding and notify MD (or speech

## 2021-02-04 NOTE — CM/SW NOTE
Spoke with Jayne Kam from [facility]. She called for an update, and anticipated DC needs.  &  to remain available and supportive for discharge planning needs.     Susan Suarez, RN Case Manager [phone]

## 2021-02-04 NOTE — INTERVAL H&P NOTE
Pre-op Diagnosis: INPT    The above referenced H&P was reviewed by Marky Reveles MD on 2/4/2021, the patient was examined and no significant changes have occurred in the patient's condition since the H&P was performed.   I discussed with the patient and/or le

## 2021-02-04 NOTE — ANESTHESIA POSTPROCEDURE EVALUATION
[address] [facility] Patient Status:  Inpatient   Age/Gender 80year old female MRN [id number]   [facility] SURGERY Attending Marisa Aldridge, [facility] Day # 3 PCP Shanelle Hubbard MD       Anesthesia Post-op Note    Procedure(s):

## 2021-02-04 NOTE — PROGRESS NOTES
THE [facility] Hematology and Oncology Progress Note   Length of Stay: 3    Subjective: Plan for stent exchange with urology today. Day 5 Venofer. Copper levels are normal. Hb 8.7 and Plt 186. No changes to leg. ROS: Limited d/t dementia.      Objective:   • ce of RLE DVT (dx 02/04/20 on Xarelto 20 mg), Dementia, Right Hydronephrosis s/p ureteral stent, Sacral wound, COVID 19 (12/27/20) and anemia presented from assisted living with anemia.      History of RLE DVT: Dx 2/4/20, likely 2/2 immobility   - Per mendoza

## 2021-02-04 NOTE — BRIEF OP NOTE
Pre-Operative Diagnosis: retained right ureter stent, history of bladder and kidney stone     Post-Operative Diagnosis: retained right ureter stent, history of bladder stone, nonobstructing kidney stone     Procedure Performed:   Procedure(s):  CYSTOSCOPY,

## 2021-02-04 NOTE — PLAN OF CARE
Problem: SAFETY ADULT - FALL  Goal: Free from fall injury  Description: INTERVENTIONS:  - Assess pt frequently for physical needs  - Identify cognitive and physical deficits and behaviors that affect risk of falls.   - [place] fall precautions as indica Progressing     Problem: MUSCULOSKELETAL - ADULT  Goal: Maintain proper alignment of affected body part  Description: INTERVENTIONS:  - Support and protect limb and body alignment per provider's orders  - Instruct and reinforce with patient and family use

## 2021-02-04 NOTE — PLAN OF CARE
Patient received alert and oriented to self, all needs anticipated by staff. Patient repositioned as per facility policy. Abts infused with no adverse effects noted.       Problem: SAFETY ADULT - FALL  Goal: Free from fall injury  Description: INTERVENT

## 2021-02-04 NOTE — CM/SW NOTE
SW spoke w/Ora who confirmed the pt is from [facility] not [facility]. Sent referral to the [facility] facility.

## 2021-02-04 NOTE — DIETARY MALNUTRITION NOTE
[facility]  NUTRITION FOLLOW-UP ASSESSMENT    Pt meets severe malnutrition criteria.     CRITERIA FOR MALNUTRITION DIAGNOSIS:  Criteria for severe malnutrition diagnosis: acute illness/injury related to wt loss greater than 5% in 1 month and energy int Gastrointestinal Hemorrhage, previously COVID-19 positive, dementia. Patient lethargic. ANTHROPOMETRICS:  Ht: 157.5 cm (5' 2\")  Wt: 41.1 kg (90 lb 8 oz). This is 100% of IBW  BMI: Body mass index is 16.55 kg/m². Ideal body weight: 50.1 kg (110 lb 7. 2 DATE: 2/8/2021    Yair Bhakta MS, RDN, LDN  Clinical Dietitian  Pager: [phone]

## 2021-02-05 LAB
ALBUMIN SERPL-MCNC: 1.8 G/DL (ref 3.4–5)
ALBUMIN/GLOB SERPL: 0.5 {RATIO} (ref 1–2)
ALP LIVER SERPL-CCNC: 56 U/L
ALT SERPL-CCNC: <6 U/L
ANION GAP SERPL CALC-SCNC: 6 MMOL/L (ref 0–18)
AST SERPL-CCNC: 10 U/L (ref 15–37)
BASOPHILS # BLD AUTO: 0.06 X10(3) UL (ref 0–0.2)
BASOPHILS NFR BLD AUTO: 0.6 %
BILIRUB SERPL-MCNC: 0.2 MG/DL (ref 0.1–2)
BUN BLD-MCNC: 15 MG/DL (ref 7–18)
BUN/CREAT SERPL: 12.3 (ref 10–20)
CALCIUM BLD-MCNC: 7.8 MG/DL (ref 8.5–10.1)
CHLORIDE SERPL-SCNC: 113 MMOL/L (ref 98–112)
CO2 SERPL-SCNC: 24 MMOL/L (ref 21–32)
CREAT BLD-MCNC: 1.22 MG/DL
DEPRECATED RDW RBC AUTO: 56.4 FL (ref 35.1–46.3)
EOSINOPHIL # BLD AUTO: 0.12 X10(3) UL (ref 0–0.7)
EOSINOPHIL NFR BLD AUTO: 1.2 %
ERYTHROCYTE [DISTWIDTH] IN BLOOD BY AUTOMATED COUNT: 16 % (ref 11–15)
GLOBULIN PLAS-MCNC: 3.5 G/DL (ref 2.8–4.4)
GLUCOSE BLD-MCNC: 104 MG/DL (ref 70–99)
HCT VFR BLD AUTO: 25.2 %
HGB BLD-MCNC: 7.7 G/DL
IMM GRANULOCYTES # BLD AUTO: 0.05 X10(3) UL (ref 0–1)
IMM GRANULOCYTES NFR BLD: 0.5 %
LYMPHOCYTES # BLD AUTO: 1.87 X10(3) UL (ref 1–4)
LYMPHOCYTES NFR BLD AUTO: 18.4 %
M PROTEIN MFR SERPL ELPH: 5.3 G/DL (ref 6.4–8.2)
MCH RBC QN AUTO: 30.2 PG (ref 26–34)
MCHC RBC AUTO-ENTMCNC: 30.6 G/DL (ref 31–37)
MCV RBC AUTO: 98.8 FL
MONOCYTES # BLD AUTO: 0.61 X10(3) UL (ref 0.1–1)
MONOCYTES NFR BLD AUTO: 6 %
NEUTROPHILS # BLD AUTO: 7.47 X10 (3) UL (ref 1.5–7.7)
NEUTROPHILS # BLD AUTO: 7.47 X10(3) UL (ref 1.5–7.7)
NEUTROPHILS NFR BLD AUTO: 73.3 %
OSMOLALITY SERPL CALC.SUM OF ELEC: 297 MOSM/KG (ref 275–295)
PLATELET # BLD AUTO: 198 10(3)UL (ref 150–450)
POTASSIUM SERPL-SCNC: 4 MMOL/L (ref 3.5–5.1)
RBC # BLD AUTO: 2.55 X10(6)UL
SODIUM SERPL-SCNC: 143 MMOL/L (ref 136–145)
T4 FREE SERPL-MCNC: 0.8 NG/DL (ref 0.8–1.7)
TSI SER-ACNC: 11 MIU/ML (ref 0.36–3.74)
WBC # BLD AUTO: 10.2 X10(3) UL (ref 4–11)

## 2021-02-05 PROCEDURE — 90792 PSYCH DIAG EVAL W/MED SRVCS: CPT | Performed by: OTHER

## 2021-02-05 RX ORDER — MEMANTINE HYDROCHLORIDE 5 MG/1
5 TABLET ORAL 2 TIMES DAILY
Status: DISCONTINUED | OUTPATIENT
Start: 2021-02-05 | End: 2021-02-08

## 2021-02-05 RX ORDER — CEPHALEXIN 500 MG/1
500 CAPSULE ORAL 3 TIMES DAILY
Qty: 15 CAPSULE | Refills: 0 | Status: SHIPPED | OUTPATIENT
Start: 2021-02-05 | End: 2021-02-08

## 2021-02-05 RX ORDER — DONEPEZIL HYDROCHLORIDE 5 MG/1
5 TABLET, FILM COATED ORAL NIGHTLY
Status: DISCONTINUED | OUTPATIENT
Start: 2021-02-05 | End: 2021-02-08

## 2021-02-05 RX ORDER — LEVOTHYROXINE SODIUM 0.07 MG/1
75 TABLET ORAL
Qty: 30 TABLET | Refills: 0 | Status: SHIPPED | OUTPATIENT
Start: 2021-02-06 | End: 2021-02-08

## 2021-02-05 RX ORDER — CLONAZEPAM 0.5 MG/1
0.25 TABLET ORAL NIGHTLY
Status: DISCONTINUED | OUTPATIENT
Start: 2021-02-05 | End: 2021-02-08

## 2021-02-05 RX ORDER — LEVOTHYROXINE SODIUM 0.07 MG/1
75 TABLET ORAL
Status: DISCONTINUED | OUTPATIENT
Start: 2021-02-06 | End: 2021-02-08

## 2021-02-05 NOTE — PROGRESS NOTES
PSYCH CONSULT    Date of Admission: 1/30/21  Date of Consult: 2/5/21  Reason for Consultation: Altered mental status, dementia    Impression: She has end stage dementia and has not recognized her children in 3-4 yrs.  She has needed help with all ADL's sinc

## 2021-02-05 NOTE — CONSULTS
[facility]  Report of Psychiatric Consultation    Maryanne Hernández Patient Status:  Inpatient    1935 MRN [id number]   [facility] 4NW-A Attending Anderson Saldana MD   [facility] Day # 4 PCP Makenzie Dixon MD     Date of Admission:  lithotripsy of retained Rt ureteral stent, irrigation and removal of bladder stones, and stent removal. She received anesthesia yesterday. Per daughter, she was dx with dementia about 12 yrs ago.  She has severe dementia and has not recognized her child NAUSEA ONLY    Medications:    Current Facility-Administered Medications:   •  [START ON 2/6/2021] Levothyroxine Sodium tab 75 mcg, 75 mcg, Oral, Before breakfast  •  acetaminophen (TYLENOL) tab 650 mg, 650 mg, Oral, Q6H PRN  •  0.9% NaCl infusion, , I assess  Judgment: unable to assess    Laboratory Data:  Lab Results   Component Value Date    WBC 10.2 02/05/2021    HGB 7.7 02/05/2021    HCT 25.2 02/05/2021    .0 02/05/2021    CREATSERUM 1.22 02/05/2021    BUN 15 02/05/2021     02/05/2021

## 2021-02-05 NOTE — PROGRESS NOTES
[facility]                INFECTIOUS DISEASE PROGRESS NOTE    Janae Blankenship Patient Status:  Inpatient    1935 MRN [id number]   [facility] 4NW-A Attending Marisa Aldridge MD   Hosp Day # 4 PCP Shanelle Hubbard MD     Antibiot BILT 0.2 0.2 0.2   TP 5.4* 5.4* 5.3*       No results found for: [facility] Encounter on 01/30/21   1.  URINE CULTURE, ROUTINE     Status: None    Collection Time: 01/31/21  1:26 AM    Specimen: Urine, clean catch   Result Value Ref R cefazolin  Can switch to po on dc      Marnie Balbuena MD  [facility]  [phone]

## 2021-02-05 NOTE — PROGRESS NOTES
[facility]  Progress Note    Josafat Corley Patient Status:  Inpatient    1935 MRN [id number]   [facility] 4NW-A Attending Nikky Burton MD   Hosp Day # 4 PCP Shay Ramirez MD         SUBJECTIVE:  Subjective:  Josafat Corley 23.0 24.0   BUN 25* 18 14 13 15   CREATSERUM 1.39* 1.24* 0.99 0.95 1.22*   CA 8.4* 8.7 7.6* 7.7* 7.8*   * 69* 128* 111* 104*       Recent Labs   Lab 01/30/21  2238 01/31/21  0701 02/01/21  1037 02/03/21  0727 02/04/21  0705 02/05/21  1006   ALT 12* 12:56 PM.  INDICATIONS:  Bilateral lower extremity edema  TECHNIQUE:  Real time, grey scale, and duplex ultrasound was used to evaluate the lower extremity venous system.  B-mode two-dimensional images of the vascular structures, Doppler spectral analysis, of the stent upper collecting system right kidney, lower portion of the stent left side of the urinary bladder. There is decrease in the hydronephrosis right kidney compared with CT scan from 12/24/2020.   Some layering milk of calcium-like calculi seen wi on the surface of this catheter. This could represent difficulty with removal of the catheter, and is therefore noted.   2. This material along the pigtail component in the bladder could also cause dysfunction of the pigtail catheter, if it restricts the p of the patient achieving goals; and the potential problems that might occur during recuperation.   I discussed reasonable alternatives to the procedure, including risks, benefits, steps to prevent infection and side effects related to the alternatives, and at the site. The patient tolerated the procedure well and had no complaints. There were no immediate complications. Routine post IVC filter insertion instructions were communicated to the patient. ESTIMATED BLOOD LOSS: Less than 5cc.   CONTRAST:  40 mL ondansetron HCl, acetaminophen       Assessment/Plan:   Patient Active Problem List:     Essential hypertension     Acute laryngitis, without mention of obstruction     Anxiety state, unspecified     Urinary tract infection, site not specified     Dysuria planning and goals of care          Plan:  Continue present management,  N39.0 URINARY TRACT INFECTION, SITE NOT SPECIFIED   F01.50 VASCULAR DEMENTIA WITHOUT BEHAVIORAL DISTURBANCE   E43 UNSPECIFIED SEVERE PROTEIN-CALORIE MALNUTRITION   K46.696 PRESSURE UL request wound care consult     Hypothyroidism–levothyroxine     Dementia–Aricept, Namenda     UTI–IV antibiotics, ID follow-up     Left ureteral calculi/history of hematuria  Right ureteral stent in place-  f/u  For details urology notes–  Had cystoscopy due to possible GI bleed, SCDs for now      Palliative care for goals of care,d/w  family and they are in agreement with palliative care consult     WEAKNESS- PT OT EVAL AND TREAT  DISPOSITION PER      See tests ordered,  Available and radiolo

## 2021-02-05 NOTE — PROGRESS NOTES
[facility]  Urology Progress Note    Edson Galeana Patient Status:  Inpatient    1935 MRN [id number]   [facility] 4NW-A Attending Manda Murdock MD   [facility] Day # 4 PCP Dustin Thomas MD     Subjective:  Edson Galeana is a(n)

## 2021-02-05 NOTE — PLAN OF CARE
Patient received alert to self, repositioned as per facility policy, no nonverbal indicators of pain. Patient trending hypotensive, md notified with new orders received. Blood pressured with improvement, will continue to monitor.    Problem: MUSCULOSKELETAL devices as appropriate  - Consider OT/PT consult to assist with strengthening/mobility  - Encourage toileting schedule  Outcome: Progressing

## 2021-02-06 LAB
ALBUMIN SERPL-MCNC: 1.9 G/DL (ref 3.4–5)
ALBUMIN/GLOB SERPL: 0.5 {RATIO} (ref 1–2)
ALP LIVER SERPL-CCNC: 62 U/L
ALT SERPL-CCNC: <6 U/L
ANION GAP SERPL CALC-SCNC: 6 MMOL/L (ref 0–18)
AST SERPL-CCNC: 12 U/L (ref 15–37)
BASOPHILS # BLD AUTO: 0.05 X10(3) UL (ref 0–0.2)
BASOPHILS NFR BLD AUTO: 0.5 %
BILIRUB SERPL-MCNC: 0.2 MG/DL (ref 0.1–2)
BUN BLD-MCNC: 13 MG/DL (ref 7–18)
BUN/CREAT SERPL: 11.1 (ref 10–20)
CALCIUM BLD-MCNC: 8.2 MG/DL (ref 8.5–10.1)
CHLORIDE SERPL-SCNC: 113 MMOL/L (ref 98–112)
CO2 SERPL-SCNC: 23 MMOL/L (ref 21–32)
CREAT BLD-MCNC: 1.17 MG/DL
DEPRECATED RDW RBC AUTO: 56.5 FL (ref 35.1–46.3)
EOSINOPHIL # BLD AUTO: 0.23 X10(3) UL (ref 0–0.7)
EOSINOPHIL NFR BLD AUTO: 2.4 %
ERYTHROCYTE [DISTWIDTH] IN BLOOD BY AUTOMATED COUNT: 16.2 % (ref 11–15)
GLOBULIN PLAS-MCNC: 3.8 G/DL (ref 2.8–4.4)
GLUCOSE BLD-MCNC: 81 MG/DL (ref 70–99)
HCT VFR BLD AUTO: 27 %
HGB BLD-MCNC: 8.2 G/DL
IMM GRANULOCYTES # BLD AUTO: 0.06 X10(3) UL (ref 0–1)
IMM GRANULOCYTES NFR BLD: 0.6 %
LYMPHOCYTES # BLD AUTO: 2.09 X10(3) UL (ref 1–4)
LYMPHOCYTES NFR BLD AUTO: 21.7 %
M PROTEIN MFR SERPL ELPH: 5.7 G/DL (ref 6.4–8.2)
MCH RBC QN AUTO: 29.8 PG (ref 26–34)
MCHC RBC AUTO-ENTMCNC: 30.4 G/DL (ref 31–37)
MCV RBC AUTO: 98.2 FL
MONOCYTES # BLD AUTO: 0.57 X10(3) UL (ref 0.1–1)
MONOCYTES NFR BLD AUTO: 5.9 %
NEUTROPHILS # BLD AUTO: 6.61 X10 (3) UL (ref 1.5–7.7)
NEUTROPHILS # BLD AUTO: 6.61 X10(3) UL (ref 1.5–7.7)
NEUTROPHILS NFR BLD AUTO: 68.9 %
OSMOLALITY SERPL CALC.SUM OF ELEC: 293 MOSM/KG (ref 275–295)
PLATELET # BLD AUTO: 178 10(3)UL (ref 150–450)
POTASSIUM SERPL-SCNC: 3.7 MMOL/L (ref 3.5–5.1)
RBC # BLD AUTO: 2.75 X10(6)UL
SODIUM SERPL-SCNC: 142 MMOL/L (ref 136–145)
WBC # BLD AUTO: 9.6 X10(3) UL (ref 4–11)

## 2021-02-06 NOTE — PROGRESS NOTES
Alert, sleeping sporadically. Talking to herself when awake. Incontinent twice. purwik in place. Contracted BLE. Vital signs stable. Afebrile. Compliant with medications. No distress noted.

## 2021-02-06 NOTE — PROGRESS NOTES
[facility]  Urology Progress Note    Sree Christensen Patient Status:  Inpatient    1935 MRN [id number]   [facility] 4NW-A Attending Adelaide Yee MD   Hosp Day # 5 PCP Rosa Del Rosario MD     Subjective:  Sree Christensen is a(n) services. We will sign off. Please let us know if we can be of any further assistance.     Vernon Hopper PA-C  [facility] Urology

## 2021-02-06 NOTE — PLAN OF CARE
Pt BP stable throughout the day. On 0.9ns tolerating well. Pt son given multiple updates throughout the day. Dr Cleo Hale consulted for medication adjustment. Daughter updated by physician. Pt has multiple wounds turned regularly throughout the day.  Pt is a fee

## 2021-02-06 NOTE — PROGRESS NOTES
[facility]  Progress Note    Leonie Regalado Patient Status:  Inpatient    1935 MRN [id number]   [facility] 4NW-A Attending Ernestina Carey MD   Hosp Day # 5 PCP Sushila Hicks MD         SUBJECTIVE:  Subjective:  Leonie Regalado 140 143 142   K 4.0 3.3* 4.2 4.0 3.7    108 112 113* 113*   CO2 21.0 25.0 23.0 24.0 23.0   BUN 18 14 13 15 13   CREATSERUM 1.24* 0.99 0.95 1.22* 1.17*   CA 8.7 7.6* 7.7* 7.8* 8.2*   GLU 69* 128* 111* 104* 81       Recent Labs   Lab 01/31/21  0701 02/ [address] & BOOK, US, US VENOUS DOPPLER LEG BILAT - DIAG IMG (CPT=93970), 5/13/2015, 12:56 PM.  INDICATIONS:  Bilateral lower extremity edema  TECHNIQUE:  Real time, grey scale, and duplex ultrasound was used to evaluate the lower extremity venous system.  B-mo FINDINGS:   KIDNEYS/BLADDER:   Right-sided nephro ureteral stent, with the upper aspect of the stent upper collecting system right kidney, lower portion of the stent left side of the urinary bladder.   There is decrease in the hydronephrosis right kidney co bladder component of the pigtail catheter, appearing as a concretion of stone-like material on the surface of this catheter. This could represent difficulty with removal of the catheter, and is therefore noted.   2. This material along the pigtail componen representative the potential benefits, risks, and side effects of this procedure, the likelihood of the patient achieving goals; and the potential problems that might occur during recuperation.   I discussed reasonable alternatives to the procedure, includi deployment of an infrarenal IVC filter. The introducer system was removed and hemostasis was achieved at the site. The patient tolerated the procedure well and had no complaints. There were no immediate complications.   Routine post IVC filter insertion sodium chloride 100 mL/hr at 02/05/21 3980     acetaminophen, bisacodyl, ondansetron HCl, acetaminophen       Assessment/Plan:   Patient Active Problem List:     Essential hypertension     Acute laryngitis, without mention of obstruction     Anxiety state, counseling/discussion    Palliative care encounter    Counseling regarding advance care planning and goals of care          Plan:  Continue present management,  N39.0 URINARY TRACT INFECTION, SITE NOT SPECIFIED   F01.50 VASCULAR DEMENTIA WITHOUT BEHAVIORAL better      Dysphagia–diet per speech therapist     hx pressure wounds upon admit to [facility], wound team was following at nursing home, will request wound care consult     Hypothyroidism–levothyroxine     Dementia–Aricept, Milly Delgadillo painful right lower extremity DVT otherwise off anticoagulation due to bleeding issues     GERD–pantoprazole     DVT prophylaxis–will avoid blood thinners due to possible GI bleed, SCDs for now      Palliative care for goals of care,d/w  family and they ar

## 2021-02-06 NOTE — PROGRESS NOTES
bedresting , bedbound , wakeful , oriented x1, skin pale , dry , afebrile , contractures all extremities , fed purred meals , appetite small , incontinent , Purewick device in use , dependant for all cares and safety multiple decubes , unable to move unass

## 2021-02-07 LAB
ALBUMIN SERPL-MCNC: 1.9 G/DL (ref 3.4–5)
ALBUMIN/GLOB SERPL: 0.5 {RATIO} (ref 1–2)
ALP LIVER SERPL-CCNC: 63 U/L
ALT SERPL-CCNC: <6 U/L
ANION GAP SERPL CALC-SCNC: 6 MMOL/L (ref 0–18)
AST SERPL-CCNC: 6 U/L (ref 15–37)
BASOPHILS # BLD AUTO: 0.06 X10(3) UL (ref 0–0.2)
BASOPHILS NFR BLD AUTO: 0.7 %
BILIRUB SERPL-MCNC: 0.2 MG/DL (ref 0.1–2)
BUN BLD-MCNC: 12 MG/DL (ref 7–18)
BUN/CREAT SERPL: 12.4 (ref 10–20)
CALCIUM BLD-MCNC: 7.7 MG/DL (ref 8.5–10.1)
CHLORIDE SERPL-SCNC: 112 MMOL/L (ref 98–112)
CO2 SERPL-SCNC: 23 MMOL/L (ref 21–32)
CREAT BLD-MCNC: 0.97 MG/DL
DEPRECATED RDW RBC AUTO: 57.2 FL (ref 35.1–46.3)
EOSINOPHIL # BLD AUTO: 0.22 X10(3) UL (ref 0–0.7)
EOSINOPHIL NFR BLD AUTO: 2.7 %
ERYTHROCYTE [DISTWIDTH] IN BLOOD BY AUTOMATED COUNT: 15.9 % (ref 11–15)
GLOBULIN PLAS-MCNC: 3.7 G/DL (ref 2.8–4.4)
GLUCOSE BLD-MCNC: 112 MG/DL (ref 70–99)
HCT VFR BLD AUTO: 27.6 %
HGB BLD-MCNC: 8.4 G/DL
IMM GRANULOCYTES # BLD AUTO: 0.05 X10(3) UL (ref 0–1)
IMM GRANULOCYTES NFR BLD: 0.6 %
LYMPHOCYTES # BLD AUTO: 1.96 X10(3) UL (ref 1–4)
LYMPHOCYTES NFR BLD AUTO: 24.4 %
M PROTEIN MFR SERPL ELPH: 5.6 G/DL (ref 6.4–8.2)
MCH RBC QN AUTO: 30 PG (ref 26–34)
MCHC RBC AUTO-ENTMCNC: 30.4 G/DL (ref 31–37)
MCV RBC AUTO: 98.6 FL
MONOCYTES # BLD AUTO: 0.39 X10(3) UL (ref 0.1–1)
MONOCYTES NFR BLD AUTO: 4.9 %
NEUTROPHILS # BLD AUTO: 5.35 X10 (3) UL (ref 1.5–7.7)
NEUTROPHILS # BLD AUTO: 5.35 X10(3) UL (ref 1.5–7.7)
NEUTROPHILS NFR BLD AUTO: 66.7 %
OSMOLALITY SERPL CALC.SUM OF ELEC: 293 MOSM/KG (ref 275–295)
PLATELET # BLD AUTO: 216 10(3)UL (ref 150–450)
POTASSIUM SERPL-SCNC: 3.4 MMOL/L (ref 3.5–5.1)
RBC # BLD AUTO: 2.8 X10(6)UL
SODIUM SERPL-SCNC: 141 MMOL/L (ref 136–145)
WBC # BLD AUTO: 8 X10(3) UL (ref 4–11)

## 2021-02-07 NOTE — PLAN OF CARE
Pt is ao to self on room air and not tele. Pt  vitals stable and pt has been afebrile during this shift. Pt tolerated meds with applesauce. Will continue to monitor pt during this shift.  Pt sleeping in bed  Problem: GASTROINTESTINAL - ADULT  Goal: Maintains wound bed, drain sites and surrounding tissue  - Implement wound care per orders  - Initiate isolation precautions as appropriate  - Initiate Pressure Ulcer prevention bundle as indicated  Outcome: Progressing     Problem: MUSCULOSKELETAL - ADULT  Goal: Ma

## 2021-02-07 NOTE — PROGRESS NOTES
[facility]  Progress Note    Gayla Haro Patient Status:  Inpatient    1935 MRN [id number]   [facility] 4NW-A Attending Josh Landeros MD   [facility] Day # 6 PCP Hugo Morrissey MD         SUBJECTIVE:  Subjective:  Gayla Haro 24.0 23.0   BUN 18 14 13 15 13   CREATSERUM 1.24* 0.99 0.95 1.22* 1.17*   CA 8.7 7.6* 7.7* 7.8* 8.2*   GLU 69* 128* 111* 104* 81       Recent Labs   Lab 02/01/21  1037 02/03/21  0727 02/04/21  0705 02/05/21  1006 02/06/21  0715   ALT 9* 7* <6* <6* <6*   AS Real time, grey scale, and duplex ultrasound was used to evaluate the lower extremity venous system. B-mode two-dimensional images of the vascular structures, Doppler spectral analysis, and color flow. Doppler imaging were performed.   The following veins the stent left side of the urinary bladder. There is decrease in the hydronephrosis right kidney compared with CT scan from 12/24/2020.   Some layering milk of calcium-like calculi seen within the central posterior collecting system right kidney, and there with removal of the catheter, and is therefore noted.   2. This material along the pigtail component in the bladder could also cause dysfunction of the pigtail catheter, if it restricts the passage of urine through the catheter, but the hydronephrosis on th might occur during recuperation. I discussed reasonable alternatives to the procedure, including risks, benefits, steps to prevent infection and side effects related to the alternatives, and risks related to not receiving this procedure.  A witnessed flower complaints. There were no immediate complications. Routine post IVC filter insertion instructions were communicated to the patient. ESTIMATED BLOOD LOSS: Less than 5cc.   CONTRAST:  40 mL of Omnipaque 350  FLUOROSCOPY TIME/DOSE: 1.0 minutes  AIR KERMA: 5 hypertension     Acute laryngitis, without mention of obstruction     Anxiety state, unspecified     Urinary tract infection, site not specified     Dysuria     Unspecified vitamin deficiency     Diarrhea     Essential hypertension, benign     Pernicious a [facility])          Plan:  Continue present management,  . S/ p  following cystoscopy, laser lithotripsy of retained right ureteral stent, irrigation and removal of bladder stones, right RGPG, right URS, right ureteral stent removal.  Afebrile, HD stable.  Urine on floor  dvt prophylaxis reviewed  PT and/or OT  Bird Willis MD

## 2021-02-08 VITALS
HEART RATE: 83 BPM | WEIGHT: 93.19 LBS | HEIGHT: 62 IN | RESPIRATION RATE: 20 BRPM | DIASTOLIC BLOOD PRESSURE: 92 MMHG | SYSTOLIC BLOOD PRESSURE: 145 MMHG | TEMPERATURE: 98 F | BODY MASS INDEX: 17.15 KG/M2 | OXYGEN SATURATION: 98 %

## 2021-02-08 LAB
ALBUMIN SERPL ELPH-MCNC: 2.96 G/DL (ref 3.75–5.21)
ALBUMIN SERPL-MCNC: 1.9 G/DL (ref 3.4–5)
ALBUMIN/GLOB SERPL: 0.5 {RATIO} (ref 1–2)
ALBUMIN/GLOB SERPL: 0.86 {RATIO} (ref 1–2)
ALP LIVER SERPL-CCNC: 67 U/L
ALPHA1 GLOB SERPL ELPH-MCNC: 0.58 G/DL (ref 0.19–0.46)
ALPHA2 GLOB SERPL ELPH-MCNC: 1.08 G/DL (ref 0.48–1.05)
ALT SERPL-CCNC: <6 U/L
ANION GAP SERPL CALC-SCNC: 7 MMOL/L (ref 0–18)
AST SERPL-CCNC: 7 U/L (ref 15–37)
B-GLOBULIN SERPL ELPH-MCNC: 0.83 G/DL (ref 0.68–1.23)
BASOPHILS # BLD AUTO: 0.05 X10(3) UL (ref 0–0.2)
BASOPHILS NFR BLD AUTO: 0.5 %
BILIRUB SERPL-MCNC: 0.2 MG/DL (ref 0.1–2)
BUN BLD-MCNC: 11 MG/DL (ref 7–18)
BUN/CREAT SERPL: 11.1 (ref 10–20)
CALCIUM BLD-MCNC: 7.6 MG/DL (ref 8.5–10.1)
CHLORIDE SERPL-SCNC: 107 MMOL/L (ref 98–112)
CO2 SERPL-SCNC: 24 MMOL/L (ref 21–32)
CREAT BLD-MCNC: 0.99 MG/DL
DEPRECATED RDW RBC AUTO: 54.8 FL (ref 35.1–46.3)
EOSINOPHIL # BLD AUTO: 0.25 X10(3) UL (ref 0–0.7)
EOSINOPHIL NFR BLD AUTO: 2.7 %
ERYTHROCYTE [DISTWIDTH] IN BLOOD BY AUTOMATED COUNT: 15.7 % (ref 11–15)
GAMMA GLOB SERPL ELPH-MCNC: 0.95 G/DL (ref 0.62–1.7)
GLOBULIN PLAS-MCNC: 3.9 G/DL (ref 2.8–4.4)
GLUCOSE BLD-MCNC: 99 MG/DL (ref 70–99)
HCT VFR BLD AUTO: 29.1 %
HGB BLD-MCNC: 9 G/DL
IMM GRANULOCYTES # BLD AUTO: 0.06 X10(3) UL (ref 0–1)
IMM GRANULOCYTES NFR BLD: 0.7 %
KAPPA LC FREE SER-MCNC: 7.25 MG/DL (ref 0.33–1.94)
KAPPA LC FREE/LAMBDA FREE SER NEPH: 1.23 {RATIO} (ref 0.26–1.65)
LAMBDA LC FREE SERPL-MCNC: 5.89 MG/DL (ref 0.57–2.63)
LYMPHOCYTES # BLD AUTO: 2.08 X10(3) UL (ref 1–4)
LYMPHOCYTES NFR BLD AUTO: 22.7 %
M PROTEIN MFR SERPL ELPH: 5.8 G/DL (ref 6.4–8.2)
M PROTEIN MFR SERPL ELPH: 6.4 G/DL (ref 6.4–8.2)
MCH RBC QN AUTO: 30 PG (ref 26–34)
MCHC RBC AUTO-ENTMCNC: 30.9 G/DL (ref 31–37)
MCV RBC AUTO: 97 FL
MONOCYTES # BLD AUTO: 0.43 X10(3) UL (ref 0.1–1)
MONOCYTES NFR BLD AUTO: 4.7 %
NEUTROPHILS # BLD AUTO: 6.3 X10 (3) UL (ref 1.5–7.7)
NEUTROPHILS # BLD AUTO: 6.3 X10(3) UL (ref 1.5–7.7)
NEUTROPHILS NFR BLD AUTO: 68.7 %
OSMOLALITY SERPL CALC.SUM OF ELEC: 285 MOSM/KG (ref 275–295)
PLATELET # BLD AUTO: 231 10(3)UL (ref 150–450)
POTASSIUM SERPL-SCNC: 3.4 MMOL/L (ref 3.5–5.1)
RBC # BLD AUTO: 3 X10(6)UL
SODIUM SERPL-SCNC: 138 MMOL/L (ref 136–145)
WBC # BLD AUTO: 9.2 X10(3) UL (ref 4–11)

## 2021-02-08 RX ORDER — CEPHALEXIN 500 MG/1
500 CAPSULE ORAL 3 TIMES DAILY
Qty: 15 CAPSULE | Refills: 0 | Status: SHIPPED | OUTPATIENT
Start: 2021-02-08 | End: 2021-02-13

## 2021-02-08 RX ORDER — CLONAZEPAM 0.5 MG/1
0.25 TABLET ORAL NIGHTLY
Qty: 30 TABLET | Refills: 0 | Status: SHIPPED | OUTPATIENT
Start: 2021-02-08

## 2021-02-08 RX ORDER — LEVOTHYROXINE SODIUM 0.07 MG/1
75 TABLET ORAL
Qty: 30 TABLET | Refills: 0 | Status: SHIPPED | OUTPATIENT
Start: 2021-02-08

## 2021-02-08 RX ORDER — ASCORBIC ACID 500 MG
500 TABLET ORAL 3 TIMES DAILY
Qty: 90 TABLET | Refills: 0 | Status: SHIPPED | OUTPATIENT
Start: 2021-02-08 | End: 2021-03-10

## 2021-02-08 RX ORDER — DONEPEZIL HYDROCHLORIDE 5 MG/1
5 TABLET, FILM COATED ORAL NIGHTLY
Qty: 30 TABLET | Refills: 0 | Status: SHIPPED | OUTPATIENT
Start: 2021-02-08 | End: 2021-02-08

## 2021-02-08 RX ORDER — MEMANTINE HYDROCHLORIDE 5 MG/1
5 TABLET ORAL 2 TIMES DAILY
Qty: 60 TABLET | Refills: 0 | Status: SHIPPED | OUTPATIENT
Start: 2021-02-08 | End: 2021-02-08

## 2021-02-08 NOTE — PROGRESS NOTES
[facility]  Progress Note    Biscoe Ill Patient Status:  Inpatient    1935 MRN [id number]   [facility] 4NW-A Attending Radha La MD   [facility] Day # 7 PCP Kranthi Ortega MD         SUBJECTIVE:  Subjective:  Biscoe Ill Recent Labs   Lab 02/04/21  0705 02/05/21  1006 02/06/21  0715 02/07/21  1027 02/08/21  0701   ALT <6* <6* <6* <6* <6*   AST 10* 10* 12* 6* 7*   ALB 1.9* 1.8* 1.9* 1.9* 1.9*       Recent Labs   Lab 02/01/21  1156   PGLU 87       No results for input( images of the vascular structures, Doppler spectral analysis, and color flow. Doppler imaging were performed.   The following veins were imaged bilaterally:  Common, deep, and superficial femoral, popliteal, sapheno-femoral junction, and posterior tibial v 12/24/2020. Some layering milk of calcium-like calculi seen within the central posterior collecting system right kidney, and there is also a non layering measurable stone in the mid-lower pole right kidney image 120 measuring 4 x 6 mm.   Stone left mid kid cause dysfunction of the pigtail catheter, if it restricts the passage of urine through the catheter, but the hydronephrosis on the right actually appears improved compared with September 2020.  3.  The right hydronephrosis has decreased in size since Srinivas prevent infection and side effects related to the alternatives, and risks related to not receiving this procedure. A witnessed verbal and signed consent was obtained and documented in the patient's chart.   IV was checked and maintained by the nurse and 100 the patient. ESTIMATED BLOOD LOSS: Less than 5cc. CONTRAST:  40 mL of Omnipaque 350  FLUOROSCOPY TIME/DOSE: 1.0 minutes  AIR KERMA: 5.42 mGy            CONCLUSION: Successful placement of a permanent [employer] IVC filter.     Dictated by (CST): Gordo mention of obstruction     Anxiety state, unspecified     Urinary tract infection, site not specified     Dysuria     Unspecified vitamin deficiency     Diarrhea     Essential hypertension, benign     Pernicious anemia     Unspecified hypothyroidism     He today, dementia medications have been discontinued as per psych, she will no longer take namenda and donepezil, her klonopin has been reduced to nightly, please hold if she is somnolent    .  S/ p  following cystoscopy, laser lithotripsy of retained right u in agreement with palliative care consult     WEAKNESS- PT OT EVAL AND TREAT  DISPOSITION PER      See tests ordered,  Available and radiology reviewed  All consultant notes reviewed  Discussed with nursing on floor  dvt prophylaxis reviewed

## 2021-02-08 NOTE — PROGRESS NOTES
I called and gave report to nurse Amelie Winston at [facility] rehab facility. Patient's physical and history were relayed to nursing staff and included past medical history, admitting diagnosis of anemia.  Patient will be picked up via [facility]

## 2021-02-08 NOTE — CM/SW NOTE
02/08/21 1200   Discharge disposition   Expected discharge disposition Skilled Nurs   Name of Facillity/Home Care/Hospice Cheryl Granados   Discharge transportation [facility]   BLS to arrive at 1:30pm. RN aware.  RN to call report and inform family o

## 2021-02-08 NOTE — PLAN OF CARE
Pt alert, disoriented. Able to follow some commands. Contracted. Has hands in splints. Took pills crushed in applesauce. Order and feed. Had good appetite throughout the day. HGB stable. Possible discharge tomorrow on PO antibiotics.  Daughter updated on PO

## 2021-02-08 NOTE — CM/SW NOTE
Pt ready to dc. Sent clinical updates via Aidin. Ora aware of dc. Will set up the pt to leave at 130pm. Rn aware of this.  Will ask RN to inform family of dc and call report    [address]

## 2021-02-08 NOTE — DISCHARGE SUMMARY
[facility]  Discharge Summary    Pepe Seaman Patient Status:  Inpatient    1935 MRN [id number]   [facility] 4NW-A Attending Isak Adames MD   Hosp Day # 7 PCP Olive Smith MD     Date of Admission: 2021    Date of D URINE CULTURE, ROUTINE     Status: None    Collection Time: 01/31/21  1:26 AM    Specimen: Urine, clean catch   Result Value Ref Range    Urine Culture  N/A     <10,000 cfu/ml Multiple species present- probable contamination.        Xr Abdomen (1 View) (cpt SAPHENOFEMORAL JUNCTION:  No reflux. THROMBI:  Thrombus in the right popliteal vein. Left common femoral, profunda and proximal left superficial femoral vein are not identified. COMPRESSION:  Normal compressibility, phasicity, and augmentation.  OTHER:  Ne kidney. No sign of left hydronephrosis. No ureteral calculus identified either side, with note made that as best can be seen there is no stone seen along the hyperdense stent within the right ureter, and no stone seen within the left ureter.   There is no There is no sign of any definite stone alongside the hyperdense stent. Bilateral kidney stones are present. No left hydronephrosis. Parapelvic cysts are present left kidney.     Dictated by (CST): Joe Angeles MD on 2/01/2021 at 8:59 AM     Finalized saturation, heart rate, and blood pressure by the nursing staff and myself during the exam  The patient was placed supine on the angiographic table and the right neck was prepped and covered with a full body drape in the usual sterile fashion.   All operato MD on 2/02/2021 at 4:03 PM       Xr Or - N/c    Result Date: 2/4/2021  PROCEDURE:  XR OR - N/C  INDICATIONS:  Cysto  COMPARISON:  [facility] , CT, CT ABDOMEN+PELVIS KIDNEYSTONE 2D RNDR(NO IV,NO ORAL)(CPT=74176), 2/01/2021, 8:30 AM.  TECHNIQUE:   FLUOROSCOPY IM Recent Labs   Lab 02/04/21  0705 02/05/21  0643 02/06/21  0715 02/07/21  1027 02/08/21  0701   WBC 6.2 10.2 9.6 8.0 9.2   HGB 8.7* 7.7* 8.2* 8.4* 9.0*   MCV 94.9 98.8 98.2 98.6 97.0   .0 198.0 178.0 216.0 231.0                 Recent Labs   Lab 0 calcifications to the left of L4. Right ureteral stent.     Dictated by (CST): Susana Clifford MD on 1/31/2021 at 2:41 PM     Finalized by (CST): Susana Clifford MD on 1/31/2021 at 2:42 PM        Us Venous Doppler Leg Bilat - Diag Img (cpt=93970)     Result scanner workstation to localize potential stones in the cranio-caudal plane. Dose reduction techniques were used.  Dose information is transmitted to the [facility] and the [facility] [employer] of Radiology) Xiao Farley [address] ([address]) which includes the Dose In in the colon. No signs of ascites. Decrease in amount of stool in the rectum. ABDOMINAL WALL:  Unremarkable. BONES:  No acute process seen. PELVIC ORGANS:  Unremarkable. LUNG BASES:  Right lower lobe atelectasis.               CONCLUSION:   1.  It mu PM     Finalized by (CST): Shaila Lopez MD on 1/30/2021 at 11:37 PM        Ir Ivc Filter Procedure     Result Date: 2/2/2021  PROCEDURE:  IR IVC FILTER PLACEMENT  COMPARISON:  None. INDICATIONS:  DVT and bleeding.   Per minute filter placement per  demonstrates a single, normal caliber IVC with normal renal vein inflow. No evidence of caval thrombus. The level of the renal veins was calibrated with a ruler.   The filter deployment system was advanced over an Amplatz wire and the filter was deployed in Oral Nightly   • Memantine HCl  5 mg Oral BID   • ClonazePAM  0.25 mg Oral Nightly   • ceFAZolin  1 g Intravenous Q12H   • docusate sodium  100 mg Oral BID   • atorvastatin  20 mg Oral Nightly   • cyanocobalamin  100 mcg Oral Daily   • escitalopram  10 mg care planning and goals of care     Severe dementia [facility])     Principal Problem:    Anemia, unspecified type  Active Problems:    Anemia    Gastrointestinal hemorrhage, unspecified gastrointestinal hemorrhage type    Dehydration    Chronic deep vein thromb antibiotics, ID follow-up        Dyslipidemia–statin     Acute renal failure due to poor intake due to dehydration–IV fluids     History of thrombosis–was on Xarelto, currently on hold   Venous Doppler showing DVT–hematology following  IVC filter placement needed. R buttock    cyanocobalamin 1000 MCG Oral Tab  Take 1,000 mcg by mouth daily. ferrous sulfate 325 (65 FE) MG Oral Tab EC  Take 325 mg by mouth 3 (three) times daily with meals. escitalopram 10 MG Oral Tab  Take 10 mg by mouth daily.     !!

## 2021-02-08 NOTE — PLAN OF CARE
Patient alert, disoriented. Confused. Contracted with hand splints. Vital signs stable. Afebrile. Room air. No SOB. No complaints of pain. Not in distress. Maintained on IV antibiotics tolerated well. Fall and Aspiration precautions in place.  Will continue

## 2021-03-15 ENCOUNTER — HOSPITAL ENCOUNTER (INPATIENT)
Facility: HOSPITAL | Age: 86
LOS: 2 days | Discharge: NURSING, REHAB OR LONG-TERM CARE | DRG: 579 | End: 2021-03-18
Attending: INTERNAL MEDICINE | Admitting: INTERNAL MEDICINE
Payer: MEDICARE

## 2021-03-15 DIAGNOSIS — L89.90 DECUBITAL ULCER: ICD-10-CM

## 2021-03-15 DIAGNOSIS — L89.153 PRESSURE INJURY OF SACRAL REGION, STAGE 3 (HCC): Primary | ICD-10-CM

## 2021-03-15 DIAGNOSIS — N30.00 ACUTE CYSTITIS WITHOUT HEMATURIA: ICD-10-CM

## 2021-03-15 PROCEDURE — 99222 1ST HOSP IP/OBS MODERATE 55: CPT | Performed by: SURGERY

## 2021-03-15 PROCEDURE — 11042 DBRDMT SUBQ TIS 1ST 20SQCM/<: CPT | Performed by: SURGERY

## 2021-03-15 RX ORDER — METOPROLOL TARTRATE 5 MG/5ML
5 INJECTION INTRAVENOUS EVERY 6 HOURS PRN
Status: DISCONTINUED | OUTPATIENT
Start: 2021-03-15 | End: 2021-03-18

## 2021-03-15 RX ORDER — ONDANSETRON 2 MG/ML
4 INJECTION INTRAMUSCULAR; INTRAVENOUS EVERY 6 HOURS PRN
Status: DISCONTINUED | OUTPATIENT
Start: 2021-03-15 | End: 2021-03-18

## 2021-03-15 RX ORDER — SENNA AND DOCUSATE SODIUM 50; 8.6 MG/1; MG/1
2 TABLET, FILM COATED ORAL DAILY
Status: CANCELLED | OUTPATIENT
Start: 2021-03-15

## 2021-03-15 RX ORDER — HEPARIN SODIUM 5000 [USP'U]/ML
5000 INJECTION, SOLUTION INTRAVENOUS; SUBCUTANEOUS EVERY 8 HOURS SCHEDULED
Status: DISCONTINUED | OUTPATIENT
Start: 2021-03-15 | End: 2021-03-18

## 2021-03-15 RX ORDER — CLONAZEPAM 0.5 MG/1
0.25 TABLET ORAL NIGHTLY
Status: CANCELLED | OUTPATIENT
Start: 2021-03-15

## 2021-03-15 RX ORDER — CLONAZEPAM 0.5 MG/1
0.25 TABLET ORAL NIGHTLY
Status: DISCONTINUED | OUTPATIENT
Start: 2021-03-15 | End: 2021-03-18

## 2021-03-15 RX ORDER — MELATONIN
1000 DAILY
Status: CANCELLED | OUTPATIENT
Start: 2021-03-15

## 2021-03-15 RX ORDER — LEVOTHYROXINE SODIUM 0.07 MG/1
75 TABLET ORAL
Status: CANCELLED | OUTPATIENT
Start: 2021-03-15

## 2021-03-15 RX ORDER — ACETAMINOPHEN 325 MG/1
650 TABLET ORAL EVERY 4 HOURS PRN
COMMUNITY

## 2021-03-15 RX ORDER — BISACODYL 10 MG
10 SUPPOSITORY, RECTAL RECTAL
Status: CANCELLED | OUTPATIENT
Start: 2021-03-15

## 2021-03-15 RX ORDER — ONDANSETRON 2 MG/ML
4 INJECTION INTRAMUSCULAR; INTRAVENOUS EVERY 4 HOURS PRN
Status: DISCONTINUED | OUTPATIENT
Start: 2021-03-15 | End: 2021-03-15 | Stop reason: SDUPTHER

## 2021-03-15 RX ORDER — MELATONIN
1000 DAILY
Status: DISCONTINUED | OUTPATIENT
Start: 2021-03-16 | End: 2021-03-18

## 2021-03-15 RX ORDER — SENNA AND DOCUSATE SODIUM 50; 8.6 MG/1; MG/1
2 TABLET, FILM COATED ORAL DAILY
Status: DISCONTINUED | OUTPATIENT
Start: 2021-03-16 | End: 2021-03-18

## 2021-03-15 RX ORDER — HYDROMORPHONE HYDROCHLORIDE 1 MG/ML
0.5 INJECTION, SOLUTION INTRAMUSCULAR; INTRAVENOUS; SUBCUTANEOUS EVERY 30 MIN PRN
Status: ACTIVE | OUTPATIENT
Start: 2021-03-15 | End: 2021-03-15

## 2021-03-15 RX ORDER — SODIUM CHLORIDE 9 MG/ML
1000 INJECTION, SOLUTION INTRAVENOUS ONCE
Status: COMPLETED | OUTPATIENT
Start: 2021-03-15 | End: 2021-03-15

## 2021-03-15 RX ORDER — BISACODYL 10 MG
10 SUPPOSITORY, RECTAL RECTAL
Status: DISCONTINUED | OUTPATIENT
Start: 2021-03-15 | End: 2021-03-18

## 2021-03-15 RX ORDER — ESCITALOPRAM OXALATE 10 MG/1
10 TABLET ORAL DAILY
Status: DISCONTINUED | OUTPATIENT
Start: 2021-03-15 | End: 2021-03-18

## 2021-03-15 RX ORDER — ACETAMINOPHEN 325 MG/1
650 TABLET ORAL EVERY 4 HOURS PRN
Status: DISCONTINUED | OUTPATIENT
Start: 2021-03-15 | End: 2021-03-18

## 2021-03-15 RX ORDER — ACETAMINOPHEN 325 MG/1
650 TABLET ORAL EVERY 4 HOURS PRN
Status: CANCELLED | OUTPATIENT
Start: 2021-03-15

## 2021-03-15 RX ORDER — ESCITALOPRAM OXALATE 10 MG/1
10 TABLET ORAL DAILY
Status: CANCELLED | OUTPATIENT
Start: 2021-03-15

## 2021-03-15 RX ORDER — MELATONIN
325
Status: CANCELLED | OUTPATIENT
Start: 2021-03-15

## 2021-03-15 RX ORDER — MELATONIN
325
Status: DISCONTINUED | OUTPATIENT
Start: 2021-03-15 | End: 2021-03-18

## 2021-03-15 RX ORDER — HYDROMORPHONE HYDROCHLORIDE 1 MG/ML
INJECTION, SOLUTION INTRAMUSCULAR; INTRAVENOUS; SUBCUTANEOUS
Status: DISCONTINUED
Start: 2021-03-15 | End: 2021-03-15 | Stop reason: WASHOUT

## 2021-03-15 RX ORDER — SULFAMETHOXAZOLE AND TRIMETHOPRIM 400; 80 MG/1; MG/1
1 TABLET ORAL EVERY OTHER DAY
Status: CANCELLED | OUTPATIENT
Start: 2021-03-15

## 2021-03-15 RX ORDER — LEVOTHYROXINE SODIUM 0.07 MG/1
75 TABLET ORAL
Status: DISCONTINUED | OUTPATIENT
Start: 2021-03-16 | End: 2021-03-18

## 2021-03-15 RX ORDER — SODIUM CHLORIDE 9 MG/ML
INJECTION, SOLUTION INTRAVENOUS CONTINUOUS
Status: ACTIVE | OUTPATIENT
Start: 2021-03-15 | End: 2021-03-15

## 2021-03-15 RX ORDER — SODIUM CHLORIDE 9 MG/ML
INJECTION, SOLUTION INTRAVENOUS CONTINUOUS
Status: DISCONTINUED | OUTPATIENT
Start: 2021-03-15 | End: 2021-03-18

## 2021-03-15 NOTE — ED PROVIDER NOTES
Patient Seen in: [facility] Emergency Department      History   Patient presents with:   Other    Stated Complaint: wound assessment    HPI/Subjective:   HPI    59-year-old female sent here by her primary care physician to be evaluated for worsening Vitals [03/15/21 1213]   /66   Pulse 65   Resp 18   Temp 98.6 °F (37 °C)   Temp src Temporal   SpO2 99 %   O2 Device None (Room air)       Current:/63   Pulse 85   Temp 98.6 °F (37 °C) (Temporal)   Resp 20   Wt 72.3 kg   SpO2 97%   BMI 29.15 kg Prelim 8.02 (*)     Neutrophil Absolute 8.02 (*)     All other components within normal limits   RAPID SARS-COV-2 BY PCR - Normal   CBC WITH DIFFERENTIAL WITH PLATELET    Narrative:      The following orders were created for panel order CBC WITH DIFFERENTIA

## 2021-03-15 NOTE — CONSULTS
INFECTIOUS DISEASE CONSULT NOTE    Celestino Goyal Patient Status:  Observation    1935 MRN [id number]   [facility] 5NW-A Attending Pascale Parish MD   Hosp Day # 0 PCP Blaze Morgan Q4H PRN  •  cefTRIAXone Sodium (ROCEPHIN) 1 g in sodium chloride 0.9% 100 mL IVPB-ADDV, 1 g, Intravenous, Once  •  ondansetron HCl (ZOFRAN) injection 4 mg, 4 mg, Intravenous, Q6H PRN  •  Heparin Sodium (Porcine) 5000 UNIT/ML injection 5,000 Units, 5,000 Un [address] 1.018   GLUUR Negative   BILUR Negative   KETUR Negative   BLOODURINE Negative   PHURINE 5.0   PROUR 100 *   UROBILINOGEN <2.0   NITRITE Negative   LEUUR Moderate*   WBCUR >50*   RBCUR 3-5*   BACUR None S

## 2021-03-15 NOTE — PROGRESS NOTES
NURSING ADMISSION NOTE      Patient admitted via Cart  Oriented to room. Safety precautions initiated. Bed in low position. Call light in reach. Unable to assess patients orientation, verbal responses incomprehensible.  Dr. Ana Rendon at bedside to ass

## 2021-03-15 NOTE — CONSULTS
[facility] Dosing Service    Initial Pharmacokinetic Consult for Vancomycin Dosing     Leonie Regalado is a 80year old patient who is being treated for cellulitis/sacral ulcer. Pharmacy has been asked to dose Vancomycin by Dr. Neris Whitt. Allergies:   At

## 2021-03-15 NOTE — CONSULTS
[facility]  Report of  Surgical Consultation with History and Physical Exam    Steve Aranda Patient Status:  Emergency    1935 MRN [id number]   Location [address] [facility] Attending Asya Lozano MD   Hosp Day # 0 [facility] Sa Recurrent UTI    • Syncope    • Thyroid disease     hypothyroid   • Unspecified essential hypertension      Past Surgical History:   Procedure Laterality Date   • CYSTOSCOPY URETEROSCOPY Right 2/4/2021    Performed by Boaz West MD at [facility] MAIN OR   • CYST signs.    Buttock: Two areas of skin breakdown over the sacrum. Small, shallow area of breakdown more anteriorly with what appears to be fibrinous exudate within the base.  Larger, deeper pressure ulcer posteriorly with small amount of devitalized tissue at cystitis with hematuria     Acute kidney injury ([facility] [address]     Severe dehydration     COVID-19     Anemia, unspecified type     Gastrointestinal hemorrhage, unspecified gastrointestinal hemorrhage type     Dehydration     Chronic deep vein thrombosis (DVT) of p Minimally responsive. Cooperative. No apparent distress. Sacral wound: Measures approximately 2 cm x 2 cm at the skin opening. It does track down to the sacrum with about 1 cm of undermining circumferentially.   There is a layer of devitalized tissue at

## 2021-03-16 ENCOUNTER — ANESTHESIA EVENT (OUTPATIENT)
Dept: SURGERY | Facility: HOSPITAL | Age: 86
DRG: 579 | End: 2021-03-16
Payer: MEDICARE

## 2021-03-16 ENCOUNTER — ANESTHESIA (OUTPATIENT)
Dept: SURGERY | Facility: HOSPITAL | Age: 86
DRG: 579 | End: 2021-03-16
Payer: MEDICARE

## 2021-03-16 PROCEDURE — 0KBP0ZZ EXCISION OF LEFT HIP MUSCLE, OPEN APPROACH: ICD-10-PCS | Performed by: SURGERY

## 2021-03-16 PROCEDURE — 0KBN0ZZ EXCISION OF RIGHT HIP MUSCLE, OPEN APPROACH: ICD-10-PCS | Performed by: SURGERY

## 2021-03-16 RX ORDER — NALOXONE HYDROCHLORIDE 0.4 MG/ML
80 INJECTION, SOLUTION INTRAMUSCULAR; INTRAVENOUS; SUBCUTANEOUS AS NEEDED
Status: DISCONTINUED | OUTPATIENT
Start: 2021-03-16 | End: 2021-03-16 | Stop reason: HOSPADM

## 2021-03-16 RX ORDER — ACETAMINOPHEN 500 MG
1000 TABLET ORAL ONCE AS NEEDED
Status: DISCONTINUED | OUTPATIENT
Start: 2021-03-16 | End: 2021-03-16 | Stop reason: HOSPADM

## 2021-03-16 RX ORDER — ETOMIDATE 2 MG/ML
INJECTION INTRAVENOUS AS NEEDED
Status: DISCONTINUED | OUTPATIENT
Start: 2021-03-16 | End: 2021-03-16 | Stop reason: SURG

## 2021-03-16 RX ORDER — ONDANSETRON 2 MG/ML
4 INJECTION INTRAMUSCULAR; INTRAVENOUS AS NEEDED
Status: DISCONTINUED | OUTPATIENT
Start: 2021-03-16 | End: 2021-03-16 | Stop reason: HOSPADM

## 2021-03-16 RX ORDER — LIDOCAINE HYDROCHLORIDE 10 MG/ML
INJECTION, SOLUTION EPIDURAL; INFILTRATION; INTRACAUDAL; PERINEURAL AS NEEDED
Status: DISCONTINUED | OUTPATIENT
Start: 2021-03-16 | End: 2021-03-16 | Stop reason: SURG

## 2021-03-16 RX ORDER — PHENYLEPHRINE HCL 10 MG/ML
VIAL (ML) INJECTION AS NEEDED
Status: DISCONTINUED | OUTPATIENT
Start: 2021-03-16 | End: 2021-03-16 | Stop reason: SURG

## 2021-03-16 RX ORDER — HYDROMORPHONE HYDROCHLORIDE 1 MG/ML
0.4 INJECTION, SOLUTION INTRAMUSCULAR; INTRAVENOUS; SUBCUTANEOUS EVERY 5 MIN PRN
Status: DISCONTINUED | OUTPATIENT
Start: 2021-03-16 | End: 2021-03-16 | Stop reason: HOSPADM

## 2021-03-16 RX ORDER — SODIUM CHLORIDE, SODIUM LACTATE, POTASSIUM CHLORIDE, CALCIUM CHLORIDE 600; 310; 30; 20 MG/100ML; MG/100ML; MG/100ML; MG/100ML
INJECTION, SOLUTION INTRAVENOUS CONTINUOUS
Status: DISCONTINUED | OUTPATIENT
Start: 2021-03-16 | End: 2021-03-16 | Stop reason: HOSPADM

## 2021-03-16 RX ORDER — METOCLOPRAMIDE HYDROCHLORIDE 5 MG/ML
10 INJECTION INTRAMUSCULAR; INTRAVENOUS AS NEEDED
Status: DISCONTINUED | OUTPATIENT
Start: 2021-03-16 | End: 2021-03-16 | Stop reason: HOSPADM

## 2021-03-16 RX ORDER — MIDAZOLAM HYDROCHLORIDE 1 MG/ML
1 INJECTION INTRAMUSCULAR; INTRAVENOUS EVERY 5 MIN PRN
Status: DISCONTINUED | OUTPATIENT
Start: 2021-03-16 | End: 2021-03-16 | Stop reason: HOSPADM

## 2021-03-16 RX ORDER — LABETALOL HYDROCHLORIDE 5 MG/ML
5 INJECTION, SOLUTION INTRAVENOUS EVERY 5 MIN PRN
Status: DISCONTINUED | OUTPATIENT
Start: 2021-03-16 | End: 2021-03-16 | Stop reason: HOSPADM

## 2021-03-16 RX ORDER — HYDROCODONE BITARTRATE AND ACETAMINOPHEN 5; 325 MG/1; MG/1
2 TABLET ORAL AS NEEDED
Status: DISCONTINUED | OUTPATIENT
Start: 2021-03-16 | End: 2021-03-16 | Stop reason: HOSPADM

## 2021-03-16 RX ORDER — HYDROCODONE BITARTRATE AND ACETAMINOPHEN 5; 325 MG/1; MG/1
1 TABLET ORAL AS NEEDED
Status: DISCONTINUED | OUTPATIENT
Start: 2021-03-16 | End: 2021-03-16 | Stop reason: HOSPADM

## 2021-03-16 RX ORDER — MEPERIDINE HYDROCHLORIDE 25 MG/ML
12.5 INJECTION INTRAMUSCULAR; INTRAVENOUS; SUBCUTANEOUS AS NEEDED
Status: DISCONTINUED | OUTPATIENT
Start: 2021-03-16 | End: 2021-03-16 | Stop reason: HOSPADM

## 2021-03-16 RX ADMIN — ETOMIDATE 10 MG: 2 INJECTION INTRAVENOUS at 15:53:00

## 2021-03-16 RX ADMIN — SODIUM CHLORIDE: 9 INJECTION, SOLUTION INTRAVENOUS at 16:29:00

## 2021-03-16 RX ADMIN — ETOMIDATE 4 MG: 2 INJECTION INTRAVENOUS at 16:08:00

## 2021-03-16 RX ADMIN — LIDOCAINE HYDROCHLORIDE 50 MG: 10 INJECTION, SOLUTION EPIDURAL; INFILTRATION; INTRACAUDAL; PERINEURAL at 15:54:00

## 2021-03-16 RX ADMIN — PHENYLEPHRINE HCL 50 MCG: 10 MG/ML VIAL (ML) INJECTION at 16:07:00

## 2021-03-16 NOTE — WOUND PROGRESS NOTE
[facility]  Inpatient Wound Care Contact Note    Yvette Menesess Patient Status:  Observation    1935 MRN [id number]   [facility] SURGERY Attending Anusha Davis MD   Hosp Day # 0 PCP Myles Hernandes MD     Attempted to see pa

## 2021-03-16 NOTE — SLP NOTE
SLP order received to evaluate oropharyngeal swallow. Discussed with RN who reported pt to remain NPO pending surgery this afternoon. SLP will attempt when medically appropriate and available.     Addendum   Patient not yet down for surgical debridement

## 2021-03-16 NOTE — PLAN OF CARE
Problem: RISK FOR INFECTION - ADULT  Goal: Absence of fever/infection during anticipated neutropenic period  Description: INTERVENTIONS  - Monitor WBC  - Administer growth factors as ordered  - Implement neutropenic guidelines  3/16/2021 0104 by Rachel Ross social support system  3/16/2021 0104 by Lloyd Newberry RN  Outcome: Progressing  3/16/2021 0101 by Lloyd Newberry, RN  Outcome: Progressing     Problem: Altered Communication/Language Barrier  Goal: Patient/Family is able to understand and partic RN  Outcome: Progressing  3/16/2021 0101 by Magalie Shanks RN  Outcome: Progressing  Goal: Hemodynamic stability and optimal renal function maintained  Description: INTERVENTIONS:  - Monitor labs and assess for signs and symptoms of volume excess or d Implement preventative oral hygiene regimen  - Implement oral medicated treatments as ordered  3/16/2021 0104 by Shay Stanford RN  Outcome: Progressing  3/16/2021 0101 by Shay Stanford RN  Outcome: Progressing     Problem: MUSCULOSKELETAL - JAKI patient to participate in ADLs to maximize function  - Promote sitting position while performing ADLs such as feeding, grooming, and bathing  - Educate and encourage patient/family in tolerated functional activity level and precautions during self-care goals for specific interventions  Outcome: Progressing     Pt alert and oriented to self. Incomprehensible speech, hx of advanced dementia. O2 96%, room air. Tele, NSR. Afebrile. Electrolyte protocol. Heparin. Incontinent x2, briefed. Purewick in place.  To

## 2021-03-16 NOTE — PHYSICAL THERAPY NOTE
PT eval received per Functional Mobility Score. Pt is total assist, non-ambulatory, with history of dementia and agitation and does not follow commands. No skilled IPPT is warranted. Will sign off.

## 2021-03-16 NOTE — PROGRESS NOTES
INFECTIOUS DISEASE PROGRESS NOTE    Celestino Goyal Patient Status:  Observation    1935 MRN [id number]   [facility] 5NW-A Attending Janiya Lopez MD   Hosp Day # 0 PCP Gabriel Harris 121/75, pulse 56, temperature 97.7 °F (36.5 °C), resp. rate 16, weight 97 lb 11.2 oz (44.3 kg), SpO2 94 %, not currently breastfeeding. HEENT: no scleral icterus or conjunctival injection. No obvious lesions.  Exam limited due to poor cooperation  Neck: No UTI.  5. VESTA- mild elevation of cr above baseline, may have had a component of vol depletion. Improving.   6. L ischial wound- did not seem infected    PLAN:  - empiric zosyn and vanco  - surgery eval appreciated, plans for OR with cultures later today  - f

## 2021-03-16 NOTE — PROGRESS NOTES
Virginia Allison ([phone]) the POA to attempt consent. He stated that Dr. Brandie Dumont, his sister Carlos Kael and himself are having a scheduled 3 way call about today's debridement at 1100. Carlos Mina stated that if 3 way call does not happen nurse can call for consent.  C

## 2021-03-16 NOTE — ANESTHESIA POSTPROCEDURE EVALUATION
[address] Patient Status:  Observation   Age/Gender 80year old female MRN [id number]   [facility] SURGERY Attending Marisa Aldridge MD   Hosp Day # 0 PCP Shanelle Hubbard MD       Anesthesia Post-op Note    Procedure(s

## 2021-03-16 NOTE — H&P
34 [facility] Patient Status:  Observation    1935 MRN [id number]   [facility] 5NW-A Attending Cleo Albrecht MD   Hosp Day # 0 PCP Shilpa Waller MD     Date:  3/16/2021  Date of Admission: with the current plan and do understand poor prognosis.   I agree with Dr. Wray from infectious disease that overall prognosis remains poor and wounds may not heal despite all best medical and or surgical efforts but we will continue to respect and follow No    Allergies/Medications: Allergies:   Ativan [Lorazepam]        Ciprofloxacin Hcl       NAUSEA ONLY  acetaminophen 325 MG Oral Tab, Take 650 mg by mouth every 4 (four) hours as needed for Pain.   clonazePAM 0.5 MG Oral Tab, Take 0.5 tablets (0.25 mg t auscultation bilaterally, respirations unlabored       Heart:    Regular rate and rhythm, S1 and S2 normal,    Abdomen:     Soft, non-tender, bowel sounds active all four quadrants,                Extremities:    no cyanosis, icterus or edema         [facility] ([facility]     Hypothyroidism in adult     Cellulitis and abscess of leg     Thrombocytopenia (HCC)     Altered mental status     Acute encephalopathy     Physical deconditioning     Altered mental status, unspecified altered mental status type     Hypokalemia expected to span two midnight's based on the clinical documentation in H+P. Based on patients current state of illness, I anticipate that, after discharge, patient will require TBD.       WEAKNESS- PT OT EVAL AND TREAT  DISPOSITION PER     Se

## 2021-03-16 NOTE — ANESTHESIA PREPROCEDURE EVALUATION
PRE-OP EVALUATION    Patient Name: Selena Peter    Pre-op Diagnosis: Decubital ulcer [L89.90]    Procedure(s):      Surgeon(s) and Role:     Dereck Coffman MD - Primary    Pre-op vitals reviewed.   Temp: 97.7 °F (36.5 °C)  Pulse: 56  Resp: 16  B mg total) by mouth nightly., Disp: 30 tablet, Rfl: 0, 3/14/2021 at 2100  Levothyroxine Sodium 75 MCG Oral Tab, Take 1 tablet (75 mcg total) by mouth before breakfast., Disp: 30 tablet, Rfl: 0, 3/15/2021 at 0800  bisacodyl (DULCOLAX) 10 MG Rectal Suppos, Pl impairment  Dementia, in, senility, with behavioral disturbance (HCC) Hypothyroidism in adult  Cellulitis and abscess of leg Thrombocytopenia (HCC)  Altered mental status Acute encephalopathy  Physical deconditioning Altered mental status, unspecified alte 03/16/2021    K 4.0 03/16/2021     (H) 03/16/2021    CO2 23.0 03/16/2021    BUN 32 (H) 03/16/2021    CREATSERUM 1.06 (H) 03/16/2021    GLU 90 03/16/2021    CA 8.3 (L) 03/16/2021            Airway      Mallampati: III  Mouth opening: 3 FB  TM distance

## 2021-03-16 NOTE — PROGRESS NOTES
PROBLEM: Infected stage III sacral decubitus     ASSESSMENT: Pt is confused, fatigued - [facility] orientation due to advanced dementia. Speech is incomprehensible. Becomes agitated when repositioned.  Maintaining O2 sat WNL on RA, lungs clear, no signs of respira

## 2021-03-16 NOTE — OPERATIVE REPORT
[facility]  Op Note    [facility] Location: OR   [facility] [id number] MRN [id number]   Admission Date 3/15/2021 Operation Date 3/16/2021   Attending Physician Brittany Pham MD Operating Physician Lola Cardona MD   DATE OF OPERATION: 3/16/2021 condition.   FINDINGS: The wound measures 4 cm x 2.5 cm x 2 cm deep  Noah Max MD

## 2021-03-16 NOTE — DIETARY MALNUTRITION NOTE
[facility]  NUTRITION ASSESSMENT    Pt meets chronic - moderate malnutrition criteria at this time.      CRITERIA FOR MALNUTRITION DIAGNOSIS:  Criteria for non-severe malnutrition diagnosis: acute illness/injury related to wt loss 7.5% in 3 months, bod Encounters:  03/15/21 : 44.3 kg (97 lb 11.2 oz)  02/08/21 : 42.3 kg (93 lb 3.2 oz)  01/01/21 : 50.2 kg (110 lb 10.7 oz)  08/17/18 : 49.9 kg (110 lb)  07/07/18 : 49 kg (108 lb)  12/31/17 : 61.2 kg (134 lb 14.7 oz)  04/14/17 : 61.2 kg (135 lb)  03/18/17 : 64

## 2021-03-17 PROBLEM — Z51.5 PALLIATIVE CARE BY SPECIALIST: Status: ACTIVE | Noted: 2021-02-02

## 2021-03-17 PROCEDURE — 99222 1ST HOSP IP/OBS MODERATE 55: CPT | Performed by: INTERNAL MEDICINE

## 2021-03-17 NOTE — PROGRESS NOTES
INFECTIOUS DISEASE PROGRESS NOTE    Racquel Rincon Patient Status:  Observation    1935 MRN [id number]   [facility] 5NW-A Attending Brittany Pham MD   Hosp Day # 1 PCP Teresa Cuello (!) 47, temperature 98.7 °F (37.1 °C), temperature source Axillary, resp. rate 14, weight 97 lb 11.2 oz (44.3 kg), SpO2 94 %, not currently breastfeeding. HEENT: no scleral icterus or conjunctival injection. No obvious lesions.  Exam limited due to poor co Wound not down to bone per OR report  2. Leukocytosis. Resolved. 3. Advanced dementia  4. Abnl UA/ pyuria but urine cultures negative. Unable to determine if any sxs of UTI.   5. VESTA- mild elevation of cr above baseline, may have had a component of vol dep

## 2021-03-17 NOTE — PLAN OF CARE
Problem: RISK FOR INFECTION - ADULT  Goal: Absence of fever/infection during anticipated neutropenic period  Description: INTERVENTIONS  - Monitor WBC  - Administer growth factors as ordered  - Implement neutropenic guidelines  Outcome: Progressing     P deliver with applesauce as needed  - Discontinue feeding and notify MD (or speech pathologist) if coughing or persistent throat clearing or wet/gurgly vocal quality is noted  Outcome: Progressing     Problem: Patient/Family Goals  Goal: Patient/Family Marta Donaldson

## 2021-03-17 NOTE — SLP NOTE
ADULT SWALLOWING EVALUATION    ASSESSMENT    ASSESSMENT/OVERALL IMPRESSION:  Orders were received for a bedside swallowing evaluation as patient admitted on an altered diet. Patient known to our department from previous admits.  This is 3rd swallowing evalu following the assessment with review of diet recommendations with understanding verbalized. Collaborated with RN following the assessment in regards to diet and need for consideration of palliative care consult with agreement verbalized.               Sophie Burks Natural;Functional  Symmetry: Within Functional Limits  Strength: Unable to assess  Tone: Within Functional Limits  Range of Motion: Unable to assess  Rate of Motion: Unable to assess    Voice Quality: Weak  Respiratory Status: Unlabored  Consistencies Tri

## 2021-03-17 NOTE — PROGRESS NOTES
[facility]  Progress Note    Lidia Cordova Patient Status:  Observation    1935 MRN [id number]   [facility] 5NW-A Attending Leanne Briceno MD   Hosp Day # 0 PCP Charli Bartlett MD         SUBJECTIVE:  Subjective:  Marc Reed ALB 2.4* 2.1*       No results for input(s): PGLU in the last 168 hours. No results for input(s): URINE, CULTI, BLDSMR in the last 168 hours. Hospital Encounter on 03/15/21   1.  TISSUE AEROBIC CULTURE     Status: None (Preliminary result)    [facility] Cognitive impairment     Dementia, in, senility, with behavioral disturbance (HCC)     Hypothyroidism in adult     Cellulitis and abscess of leg     Thrombocytopenia (HCC)     Altered mental status     Acute encephalopathy     Physical deconditioning     A follow     Hypothyroid–levothyroxine     DVT prophylaxis–subcu heparin 5000 units every 8 hours     Constipation–senna as needed     Anemia–multifactorial–anemia of chronic disease, monitor H&H transfuse for less than 7     Hypernatremia, hyperchloremia–du

## 2021-03-17 NOTE — PLAN OF CARE
Pt is alert to self, incomprehensible speech, hx advanced dementia. RA, tele NSR/SB. Heparin, electrolyte protocol. Purewick in place. Incontinent of bowel. Bilateral bunny boots. Bedbound. IVF, IV abx. Seen by speech today, see note.  Placed on puree diet intake as appropriate  - Instruct patient on fluid and nutrition restrictions as appropriate  Outcome: Progressing     Problem: MUSCULOSKELETAL - ADULT  Goal: Return mobility to safest level of function  Description: INTERVENTIONS:  - Assess patient stabil

## 2021-03-17 NOTE — OCCUPATIONAL THERAPY NOTE
OT eval received per Functional Mobility Score. Pt is total assist, non-ambulatory, with history of dementia and agitation and does not follow commands. No skilled IOT is warranted. Will sign off.

## 2021-03-17 NOTE — CONSULTS
[facility]  Report of Inpatient Wound Care Consultation     Sree Christensen Patient Status:  Inpatient    1935 MRN [id number]   [facility] 5NW-A Attending Adelaide Yee MD   Hosp Day # 1 PCP Rosa Del Rosario MD     [facility] HCT 30.5* 27.7* 30.4*   .0 220.0 238.0   K 3.9 4.0  --    CREATSERUM 1.38* 1.06*  --    BUN 43* 32*  --    * 90  --    CA 8.6 8.3*  --    ALB 2.4* 2.1*  --    TP 6.8 5.8*  --        Imaging:   See EMR  Microbiology:   See EMR    ASSESSMENT/

## 2021-03-17 NOTE — CONSULTS
[id number] [facility] Patient Status:  Inpatient    1935 MRN [id number]   [facility] 5NW-A Attending Amanda Huntley MD   Hosp Day # 1 PCP Vipul Duron MD     Date of Consult: 3/17/2021 not think that her wounds will heal. He expressed understanding.   --talked about Trajectory of Dementia  briefly and that we anticipate that pt will continue to decline with more needs.      We discussed current clinical condition and overall  prognosis pe Consciosness  100   Full         Normal                      Full               Normal                         Full           No Disease            90     Full         Normal                      Full                   Normal                         Full Rectal, Q24H PRN  •  Senna-Docusate Sodium (SENOKOT S) 8.6-50 MG tab 2 tablet, 2 tablet, Oral, Daily  •  clonazePAM (KLONOPIN) tab 0.25 mg, 0.25 mg, Oral, Nightly  •  Levothyroxine Sodium tab 75 mcg, 75 mcg, Oral, Before breakfast  •  acetaminophen (TYLENO present:  NO    Physical Exam:  General: sleeping and in no apparent respiratory distress, frail   HEENT: No focal deficits.    Lungs:Normal excursions and effort  Abdomen: Soft, non-tender, non-distended, normal bowel sounds X 4 quadrants, no rebound or gu

## 2021-03-17 NOTE — PROGRESS NOTES
[facility]  Progress Note    Imagene Levels Patient Status:  Inpatient    1935 MRN [id number]   [facility] 5NW-A Attending Lizzie Craig MD   Hosp Day # 1 PCP Jess Gutierrez MD     Subjective:  Pt resting in bed non-communicati Hyperglycemia     Acute cystitis with hematuria     Acute kidney injury (HCC)     Severe dehydration     COVID-19     Anemia, unspecified type     Gastrointestinal hemorrhage, unspecified gastrointestinal hemorrhage type     Dehydration     Chronic deep ve

## 2021-03-18 VITALS
SYSTOLIC BLOOD PRESSURE: 122 MMHG | OXYGEN SATURATION: 99 % | HEART RATE: 61 BPM | WEIGHT: 97.69 LBS | RESPIRATION RATE: 13 BRPM | TEMPERATURE: 98 F | BODY MASS INDEX: 18 KG/M2 | DIASTOLIC BLOOD PRESSURE: 76 MMHG

## 2021-03-18 RX ORDER — LEVOFLOXACIN 750 MG/1
750 TABLET ORAL
Qty: 4 TABLET | Refills: 0 | Status: ON HOLD | OUTPATIENT
Start: 2021-03-18 | End: 2021-03-23

## 2021-03-18 RX ORDER — AMOXICILLIN AND CLAVULANATE POTASSIUM 875; 125 MG/1; MG/1
1 TABLET, FILM COATED ORAL 2 TIMES DAILY
Qty: 16 TABLET | Refills: 0 | Status: ON HOLD | OUTPATIENT
Start: 2021-03-18 | End: 2021-03-23

## 2021-03-18 NOTE — CONSULTS
120 [facility] dosing service    Follow-up Pharmacokinetic Consult for Vancomycin Dosing     Zara Hudson is a 80year old patient who is being treated for Infected sacral decub ulcer with surrounding cellulitis .    Patient is on day 4 of Vancomycin and (Preliminary result)    Collection Time: 03/16/21  4:09 PM    Specimen: Back; Tissue   Result Value Ref Range    Anaerobic Culture Pending N/A   3.  BLOOD CULTURE     Status: None (Preliminary result)    Collection Time: 03/15/21  6:25 PM    Specimen: Blood

## 2021-03-18 NOTE — PROGRESS NOTES
Pt is alert to self, incomprehensible speech, hx advanced dementia. RA, tele NSR/SB. Heparin, electrolyte protocol. Purewick in place. Incontinent of bowel. Bilateral bunny boots. Bedbound. IVF, IV abx. puree diet nectar thick liquids, tolerating well.  Pt

## 2021-03-18 NOTE — PROGRESS NOTES
INFECTIOUS DISEASE PROGRESS NOTE    Matheus Reyes Patient Status:  Observation    1935 MRN [id number]   [facility] 5NW-A Attending Eric Brown MD   Hosp Day # 2 PCP Antonieta Ferrell Supple. Respiratory: Clear to auscultation bilaterally. No wheezes. No rhonchi. Poor effort  Cardiovascular: S1, S2.  Regular rate and rhythm. Abdomen: Soft, nontender, nondistended. Positive bowel sounds. Musculoskeletal: contracted.   Integument: N elevation of cr above baseline, may have had a component of vol depletion. Improving.   6. L ischial wound- did not seem infected    PLAN:  - continue IV zosyn  - will dc vanco and start dapto (will check CK level prior to starting datpo)   - follow blood c

## 2021-03-18 NOTE — PROGRESS NOTES
[facility]  Progress Note    Maycol Rosado Patient Status:  Observation    1935 MRN [id number]   [facility] 5NW-A Attending Sana Maldonado MD   Hosp Day # 2 PCP Marisa Vidal MD         SUBJECTIVE:  Subjective:  Vashti Giordano 03/15/21  1257 03/16/21  0543 03/18/21  0611   ALT 18 13 14   AST 28 20 19   ALB 2.4* 2.1* 2.0*       No results for input(s): PGLU in the last 168 hours. No results for input(s): URINE, CULTI, BLDSMR in the last 168 hours.     Hospital Encounter on 03/1 g Intravenous Q8H   • vancomycin  15 mg/kg Intravenous Q24H     • sodium chloride 83 mL/hr at 03/17/21 0548     bisacodyl, acetaminophen, ondansetron HCl, metoprolol Tartrate       Assessment/Plan:   Patient Active Problem List:     Essential hypertension OF OPERATION: 3/16/2021  PREOPERATIVE DIAGNOSIS: Sacral decubitus ulcer  POSTOPERATIVE DIAGNOSIS: Sacral decubitus ulcer  PROCEDURE PERFORMED: Irrigation and debridement of sacral decubitus ulcer  SURGEON:  Armida Lundborg, MD     Pressure injury of sa as per the notes, he seems realistic about poor wound healing as well as poor prognosis overall and poor chances of any meaningful or sustainable recovery       See tests ordered,  Available and radiology reviewed  All consultant notes reviewed  Discussed

## 2021-03-18 NOTE — PROGRESS NOTES
[facility]  Progress Note    Elena De Jesus Patient Status:  Inpatient    1935 MRN [id number]   [facility] 5NW-A Attending Angelic Betancourt MD   Hosp Day # 2 PCP Anders Tamayo MD     Subjective:  Pt resting in bed non-communicati adult     Cellulitis and abscess of leg     Thrombocytopenia (HCC)     Altered mental status     Acute encephalopathy     Physical deconditioning     Altered mental status, unspecified altered mental status type     Hypokalemia     Transient cerebral ische

## 2021-03-18 NOTE — DIETARY MALNUTRITION NOTE
[facility]  NUTRITION ASSESSMENT    Pt meets chronic - moderate malnutrition criteria at this time.      CRITERIA FOR MALNUTRITION DIAGNOSIS:  Criteria for non-severe malnutrition diagnosis: acute illness/injury related to wt loss 7.5% in 3 months, bod wound debridement today. Will follow for [address]. ANTHROPOMETRICS:  Ht:  5' 2\"  Wt: 44.3 kg (97 lb 11.2 oz). This is 87% of IBW  BMI: Body mass index is 17.87 kg/m².   IBW: 50 kg    WEIGHT HISTORY:   Patient Weight(s) for the past 336 hrs:   Weight   03/15 MONITOR AND EVALUATE/NUTRITION GOALS:  1. PO intake of 75% of meals TID - New  2. Weight stable within 1 to 2 lbs during admission - New  3. Provide nutrition adequate for wound healing - New  4.  Return to PO intake in 24-48 hrs - New    MEDICATIONS: I

## 2021-03-18 NOTE — CDS QUERY
Clarification of Procedure- Debridement  Clinical Documentation Clarification Form  Dear Dr. Tho Young information (provided below) indicates a debridement was done.  For accurate ICD-10-PCS code assignment,  PLEASE “X” THE MOST APPROPRIATE STATEMENT FOR EA

## 2021-03-18 NOTE — CM/SW NOTE
03/18/21 1400   CM/SW Screening   Referral Source Social Work (self-referral)   Information Source Chart review   Patient's Mental Status Memory Impairments;Confused   Patient's [address] [facility] Name   ([address]

## 2021-03-18 NOTE — CM/SW NOTE
03/18/21 1432   Discharge disposition   Expected discharge disposition Skilled Nurs   Name of Facillity/Home Care/Hospice [facility]   Discharge transportation [employer]     SW confirmed with Kizzy King at [facility] - pt able to

## 2021-03-18 NOTE — DISCHARGE PLANNING
NURSING DISCHARGE NOTE    Discharged to [facility] via Ambulance  Accompanied by Support staff  Belongings Taken by patient/family. Discharge navigator complete.   Discharge instructions reviewed, report successfully called to RN at

## 2021-03-18 NOTE — PLAN OF CARE
Pt is A&Ox1, h/o advanced dementia. VSS, afebrile. Maintaining O2 sats >94% on RA. Tele, NSR-SB. Heparin. EP. Last BM 3/17. Tolerating pureed, necture thickened diet, pills crushed in a/s. Incontinent, briefed, [facility] in place. Bedrest, contracted x4.  Pt own health  - Refer to Case Management Department for coordinating discharge planning if the patient needs post-hospital services based on physician/LIP order or complex needs related to functional status, cognitive ability or social support system  Outcom and assess for signs and symptoms of volume excess or deficit  - Monitor intake, output and patient weight  - Monitor urine specific gravity, serum osmolarity and serum sodium as indicated or ordered  - Monitor response to interventions for patient's volum PT/OT consults as needed  - Advance activity as appropriate  - Communicate ordered activity level and limitations with patient/family  Outcome: Progressing  Goal: Maintain proper alignment of affected body part  Description: INTERVENTIONS:  - Support and p Progressing     Problem: Impaired Communication  Goal: Patient will achieve maximal communication potential  Description: Interventions:    Outcome: Progressing     Problem: Patient/Family Goals  Goal: Patient/Family Short Term Goal  Description: Patient's

## 2021-03-18 NOTE — PROGRESS NOTES
120 [facility] dosing service    Follow-up Pharmacokinetic Consult for Vancomycin Dosing     Zara Hudson is a 80year old patient who is being treated for  Infected sacral decub ulcer with surrounding cellulitis .    Patient is on day 3 of Vancomycin and 4:09 PM    Specimen: Back; Tissue   Result Value Ref Range    Anaerobic Culture Pending N/A   3.  BLOOD CULTURE     Status: None (Preliminary result)    Collection Time: 03/15/21  6:25 PM    Specimen: Blood,peripheral   Result Value Ref Range    Blood Cultu

## 2021-03-18 NOTE — CDS QUERY
Impaired Nutritional Status  DOCUMENTATION CLARIFICATION FORM  Dear Doctor:  Clinical information suggests impaired nutritional status.  For accurate ICD-10-CM code assignment,   PLEASE “X” THE  DIAGNOSIS THAT APPLIES TO CURRENT NUTRITIONAL STATUS:     [ dysphagia, esophageal reflux, HLD. Chart reviewed d/t PU, MST 3 for wt loss/decreased appetite, and low BMI of 17.87 kg/m2. Per EMR pt with multiple stage 2 and stage 3 pressure injuries to hips, legs, feet and coccyx.  Pt currently NPO pending SLP eval. Un

## 2021-03-18 NOTE — DISCHARGE SUMMARY
[facility]  Discharge Summary    Ginette Holloway Patient Status:  Inpatient    1935 MRN [id number]   [facility] 5NW-A Attending Tereza York MD   [facility] Day # 2 PCP Magaly Fung MD     Date of Admission: 3/15/2021    Date of Al Yoder without hematuria    No results found. Hospital Encounter on 03/15/21   1. TISSUE AEROBIC CULTURE     Status: Abnormal (Preliminary result)    Collection Time: 03/16/21  4:09 PM    Specimen: Back;  Tissue   Result Value Ref Range    Tissue Culture Result Temp: [97.5 °F (36.4 °C)-99 °F (37.2 °C)] 97.5 °F (36.4 °C)   Pulse: [47-80] 55   Resp: [14-18] 18   BP: (112-132)/(59-94) 126/61   Intake/Output:     Intake/Output Summary (Last 24 hours) at 3/18/2021 0902   Last data filed at 3/18/2021 9541       Gross tablet Oral Daily   • clonazePAM 0.25 mg Oral Nightly   • Levothyroxine Sodium 75 mcg Oral Before breakfast   • Heparin Sodium (Porcine) 5,000 Units Subcutaneous Q8H [facility]   • piperacillin-tazobactam 3.375 g Intravenous Q8H   • vancomycin 15 mg/kg Intravenou Palliative care by specialist   Acute cystitis without hematuria     Plan:   Continue present management,   DATE OF OPERATION: 3/16/2021   PREOPERATIVE DIAGNOSIS: Sacral decubitus ulcer   POSTOPERATIVE DIAGNOSIS: Sacral decubitus ulcer   PROCEDURE PERFOR continue current care but not leaning towards any further wound debridements of hospitalization as per the notes, he seems realistic about poor wound healing as well as poor prognosis overall and poor chances of any meaningful or sustainable recovery   See

## 2021-03-22 ENCOUNTER — APPOINTMENT (OUTPATIENT)
Dept: GENERAL RADIOLOGY | Facility: HOSPITAL | Age: 86
DRG: 503 | End: 2021-03-22
Attending: EMERGENCY MEDICINE
Payer: MEDICARE

## 2021-03-22 ENCOUNTER — HOSPITAL ENCOUNTER (INPATIENT)
Facility: HOSPITAL | Age: 86
LOS: 4 days | Discharge: NURSING, REHAB OR LONG-TERM CARE | DRG: 503 | End: 2021-03-26
Attending: EMERGENCY MEDICINE | Admitting: INTERNAL MEDICINE
Payer: MEDICARE

## 2021-03-22 DIAGNOSIS — F03.90 SEVERE DEMENTIA (HCC): ICD-10-CM

## 2021-03-22 DIAGNOSIS — Z51.5 PALLIATIVE CARE BY SPECIALIST: ICD-10-CM

## 2021-03-22 DIAGNOSIS — M86.9 OSTEOMYELITIS OF RIGHT FOOT, UNSPECIFIED TYPE (HCC): Primary | ICD-10-CM

## 2021-03-22 LAB
ALBUMIN SERPL-MCNC: 2.5 G/DL (ref 3.4–5)
ALBUMIN/GLOB SERPL: 0.6 {RATIO} (ref 1–2)
ALP LIVER SERPL-CCNC: 62 U/L
ALT SERPL-CCNC: 31 U/L
ANION GAP SERPL CALC-SCNC: 8 MMOL/L (ref 0–18)
AST SERPL-CCNC: 35 U/L (ref 15–37)
BASOPHILS # BLD AUTO: 0.04 X10(3) UL (ref 0–0.2)
BASOPHILS NFR BLD AUTO: 0.4 %
BILIRUB SERPL-MCNC: 0.1 MG/DL (ref 0.1–2)
BUN BLD-MCNC: 17 MG/DL (ref 7–18)
BUN/CREAT SERPL: 16.7 (ref 10–20)
CALCIUM BLD-MCNC: 8.9 MG/DL (ref 8.5–10.1)
CHLORIDE SERPL-SCNC: 110 MMOL/L (ref 98–112)
CO2 SERPL-SCNC: 23 MMOL/L (ref 21–32)
CREAT BLD-MCNC: 1.02 MG/DL
DEPRECATED RDW RBC AUTO: 56.7 FL (ref 35.1–46.3)
EOSINOPHIL # BLD AUTO: 0.15 X10(3) UL (ref 0–0.7)
EOSINOPHIL NFR BLD AUTO: 1.4 %
ERYTHROCYTE [DISTWIDTH] IN BLOOD BY AUTOMATED COUNT: 15.7 % (ref 11–15)
GLOBULIN PLAS-MCNC: 4 G/DL (ref 2.8–4.4)
GLUCOSE BLD-MCNC: 94 MG/DL (ref 70–99)
HCT VFR BLD AUTO: 30.9 %
HGB BLD-MCNC: 10 G/DL
IMM GRANULOCYTES # BLD AUTO: 0.04 X10(3) UL (ref 0–1)
IMM GRANULOCYTES NFR BLD: 0.4 %
LYMPHOCYTES # BLD AUTO: 2.7 X10(3) UL (ref 1–4)
LYMPHOCYTES NFR BLD AUTO: 25.8 %
M PROTEIN MFR SERPL ELPH: 6.5 G/DL (ref 6.4–8.2)
MCH RBC QN AUTO: 32.3 PG (ref 26–34)
MCHC RBC AUTO-ENTMCNC: 32.4 G/DL (ref 31–37)
MCV RBC AUTO: 99.7 FL
MONOCYTES # BLD AUTO: 0.56 X10(3) UL (ref 0.1–1)
MONOCYTES NFR BLD AUTO: 5.3 %
NEUTROPHILS # BLD AUTO: 6.98 X10 (3) UL (ref 1.5–7.7)
NEUTROPHILS # BLD AUTO: 6.98 X10(3) UL (ref 1.5–7.7)
NEUTROPHILS NFR BLD AUTO: 66.7 %
OSMOLALITY SERPL CALC.SUM OF ELEC: 293 MOSM/KG (ref 275–295)
PLATELET # BLD AUTO: 307 10(3)UL (ref 150–450)
POTASSIUM SERPL-SCNC: 4.2 MMOL/L (ref 3.5–5.1)
RBC # BLD AUTO: 3.1 X10(6)UL
SARS-COV-2 RNA RESP QL NAA+PROBE: NOT DETECTED
SODIUM SERPL-SCNC: 141 MMOL/L (ref 136–145)
WBC # BLD AUTO: 10.5 X10(3) UL (ref 4–11)

## 2021-03-22 PROCEDURE — 73630 X-RAY EXAM OF FOOT: CPT | Performed by: EMERGENCY MEDICINE

## 2021-03-22 PROCEDURE — 0QBN0ZZ EXCISION OF RIGHT METATARSAL, OPEN APPROACH: ICD-10-PCS | Performed by: PODIATRIST

## 2021-03-22 RX ORDER — BISACODYL 10 MG
10 SUPPOSITORY, RECTAL RECTAL
Status: DISCONTINUED | OUTPATIENT
Start: 2021-03-22 | End: 2021-03-26

## 2021-03-22 RX ORDER — CEPHALEXIN 500 MG/1
500 CAPSULE ORAL 3 TIMES DAILY
Status: ON HOLD | COMMUNITY
End: 2021-03-26

## 2021-03-22 RX ORDER — CLONAZEPAM 0.5 MG/1
0.25 TABLET ORAL NIGHTLY
Status: DISCONTINUED | OUTPATIENT
Start: 2021-03-22 | End: 2021-03-26

## 2021-03-22 RX ORDER — HEPARIN SODIUM 5000 [USP'U]/ML
5000 INJECTION, SOLUTION INTRAVENOUS; SUBCUTANEOUS EVERY 8 HOURS SCHEDULED
Status: DISCONTINUED | OUTPATIENT
Start: 2021-03-22 | End: 2021-03-26

## 2021-03-22 RX ORDER — MELATONIN
325
Status: DISCONTINUED | OUTPATIENT
Start: 2021-03-22 | End: 2021-03-26

## 2021-03-22 RX ORDER — SODIUM CHLORIDE 9 MG/ML
INJECTION, SOLUTION INTRAVENOUS CONTINUOUS
Status: DISCONTINUED | OUTPATIENT
Start: 2021-03-22 | End: 2021-03-26

## 2021-03-22 RX ORDER — SODIUM CHLORIDE 9 MG/ML
INJECTION, SOLUTION INTRAVENOUS CONTINUOUS
Status: ACTIVE | OUTPATIENT
Start: 2021-03-22 | End: 2021-03-22

## 2021-03-22 RX ORDER — ACETAMINOPHEN 325 MG/1
650 TABLET ORAL EVERY 6 HOURS PRN
Status: DISCONTINUED | OUTPATIENT
Start: 2021-03-22 | End: 2021-03-26

## 2021-03-22 RX ORDER — LEVOTHYROXINE SODIUM 0.07 MG/1
75 TABLET ORAL
Status: DISCONTINUED | OUTPATIENT
Start: 2021-03-22 | End: 2021-03-26

## 2021-03-22 RX ORDER — ESCITALOPRAM OXALATE 10 MG/1
10 TABLET ORAL DAILY
Status: DISCONTINUED | OUTPATIENT
Start: 2021-03-22 | End: 2021-03-26

## 2021-03-22 RX ORDER — COLLAGENASE SANTYL 250 [ARB'U]/G
OINTMENT TOPICAL DAILY
COMMUNITY

## 2021-03-22 NOTE — PLAN OF CARE
Pt arrived from ED on cart. No family at bedside. VSS. Oriented to self only. Bed alarm on. Skin check completed with 2 staff. Photos on chart. BM at arrival to floor. Due to void. Paged Dr. Ghanshyam Correia from [facility] for further antibiotics.  Paged Dr. Quentin Min for new

## 2021-03-22 NOTE — ED PROVIDER NOTES
Patient Seen in: [facility] Emergency Department      History   Patient presents with:  Abnormal Result    Stated Complaint: osteo    HPI/Subjective:   HPI    Patient is an unfortunate 80-year-old female with advanced dementia essentially bedbound a Comment: social    Drug use: No             Review of Systems    Positive for stated complaint: osteo  Other systems are as noted in HPI. Constitutional and vital signs reviewed. All other systems reviewed and negative except as noted above.     Physi Final result                 Please view results for these tests on the individual orders.    RAINBOW DRAW BLUE   RAINBOW DRAW LAVENDER   RAINBOW DRAW LIGHT GREEN   RAINBOW DRAW GOLD   BLOOD CULTURE   BLOOD CULTURE   AEROBIC BACTERIAL CULTURE   RA

## 2021-03-22 NOTE — ED INITIAL ASSESSMENT (HPI)
Pt A/O x1 at baseline. Sent from nursing home for chronic foot wounds. Recent xray showing osteomyelitis.

## 2021-03-22 NOTE — PROGRESS NOTES
FAMILY CONFERENCE CARE NOTES       Please note this is documentation of the phone call I just had with patient's son Keren Roberson on phone when I was added on the call per his request by nurse practitioner Pat Andrew, and with his permission there was nurse [facility] problems as listed.  He has verbalized understanding of this but he has chosen not to pursue palliative and comfort care as he has been recommended by all providers including but not limited to wound care and infectious disease at [facility].   Cecil carrillo

## 2021-03-23 LAB
ALBUMIN SERPL-MCNC: 2.4 G/DL (ref 3.4–5)
ALBUMIN/GLOB SERPL: 0.7 {RATIO} (ref 1–2)
ALP LIVER SERPL-CCNC: 57 U/L
ALT SERPL-CCNC: 35 U/L
ANION GAP SERPL CALC-SCNC: 6 MMOL/L (ref 0–18)
AST SERPL-CCNC: 32 U/L (ref 15–37)
BASOPHILS # BLD AUTO: 0.07 X10(3) UL (ref 0–0.2)
BASOPHILS NFR BLD AUTO: 1 %
BILIRUB SERPL-MCNC: 0.3 MG/DL (ref 0.1–2)
BUN BLD-MCNC: 14 MG/DL (ref 7–18)
BUN/CREAT SERPL: 16.7 (ref 10–20)
CALCIUM BLD-MCNC: 8.6 MG/DL (ref 8.5–10.1)
CHLORIDE SERPL-SCNC: 111 MMOL/L (ref 98–112)
CO2 SERPL-SCNC: 23 MMOL/L (ref 21–32)
CREAT BLD-MCNC: 0.84 MG/DL
DEPRECATED RDW RBC AUTO: 58.5 FL (ref 35.1–46.3)
EOSINOPHIL # BLD AUTO: 0.38 X10(3) UL (ref 0–0.7)
EOSINOPHIL NFR BLD AUTO: 5.5 %
ERYTHROCYTE [DISTWIDTH] IN BLOOD BY AUTOMATED COUNT: 15.5 % (ref 11–15)
GLOBULIN PLAS-MCNC: 3.6 G/DL (ref 2.8–4.4)
GLUCOSE BLD-MCNC: 86 MG/DL (ref 70–99)
HCT VFR BLD AUTO: 28.9 %
HGB BLD-MCNC: 8.9 G/DL
IMM GRANULOCYTES # BLD AUTO: 0.03 X10(3) UL (ref 0–1)
IMM GRANULOCYTES NFR BLD: 0.4 %
LYMPHOCYTES # BLD AUTO: 2.28 X10(3) UL (ref 1–4)
LYMPHOCYTES NFR BLD AUTO: 32.8 %
M PROTEIN MFR SERPL ELPH: 6 G/DL (ref 6.4–8.2)
MCH RBC QN AUTO: 31.6 PG (ref 26–34)
MCHC RBC AUTO-ENTMCNC: 30.8 G/DL (ref 31–37)
MCV RBC AUTO: 102.5 FL
MONOCYTES # BLD AUTO: 0.36 X10(3) UL (ref 0.1–1)
MONOCYTES NFR BLD AUTO: 5.2 %
NEUTROPHILS # BLD AUTO: 3.83 X10 (3) UL (ref 1.5–7.7)
NEUTROPHILS # BLD AUTO: 3.83 X10(3) UL (ref 1.5–7.7)
NEUTROPHILS NFR BLD AUTO: 55.1 %
OSMOLALITY SERPL CALC.SUM OF ELEC: 290 MOSM/KG (ref 275–295)
PLATELET # BLD AUTO: 250 10(3)UL (ref 150–450)
POTASSIUM SERPL-SCNC: 3.8 MMOL/L (ref 3.5–5.1)
RBC # BLD AUTO: 2.82 X10(6)UL
SODIUM SERPL-SCNC: 140 MMOL/L (ref 136–145)
WBC # BLD AUTO: 7 X10(3) UL (ref 4–11)

## 2021-03-23 PROCEDURE — 99222 1ST HOSP IP/OBS MODERATE 55: CPT | Performed by: INTERNAL MEDICINE

## 2021-03-23 RX ORDER — HEPARIN SODIUM 5000 [USP'U]/ML
5000 INJECTION, SOLUTION INTRAVENOUS; SUBCUTANEOUS EVERY 8 HOURS SCHEDULED
Qty: 270 SYRINGE | Refills: 3 | Status: SHIPPED | OUTPATIENT
Start: 2021-03-23

## 2021-03-23 NOTE — CM/SW NOTE
MSW left a message for the patient's daughter Neymar Jones to complete discharge planning assessment. During rounds it was discussed that son and daughter have conflicting wishes for medical plan of care. Unknown if patient has HCPOA.   Document is not found in

## 2021-03-23 NOTE — CONSULTS
Steve Aranda  1/4/1935  [id number]      PODIATRY PROGRESS NOTE    HPI: Steve Aranda is a 80year old  female with advanced dementia, bedbound and cachectic who was admitted through ED after concerns of an x-ray showing osteomyelitis at the right MTP hematuria     Acute kidney injury ([facility] [address]     Severe dehydration     COVID-19     Anemia, unspecified type     Gastrointestinal hemorrhage, unspecified gastrointestinal hemorrhage type     Dehydration     Chronic deep vein thrombosis (DVT) of proximal vein o Cigarettes        Quit date: 1983        Years since quittin.7      Smokeless tobacco: Never Used    Vaping Use      Vaping Use: Never used    Substance and Sexual Activity      Alcohol use: Not Currently        Comment: social      Drug use:  No General :  AA&O x3, NAD. VSS, Afebrile. Dressings are clean, dry, and intact to bilateral foot with offloading boots in place. Integument: Skin is warm and dry, moist praveena.  Ulceration noted to lateral 5th MTH right about 1 cm squared with smal mOsm/kg    GFR, Non- 50 (L) >=60    GFR, -American 58 (L) >=60    AST 35 15 - 37 U/L    ALT 31 13 - 56 U/L    Alkaline Phosphatase 62 55 - 142 U/L    Bilirubin, Total 0.1 0.1 - 2.0 mg/dL    Total Protein 6.5 6.4 - 8.2 g/dL    Albumin :  Pt was seen and evaluated at bedside, reviewed chart  Radiographs reviewed in chart  Pt is afebrile with VSS  WBC 10.5  No urgent need for surgery or amputation. Excisional debridement performed to right foot wound to and including level of bone.   Good

## 2021-03-23 NOTE — SLP NOTE
ADULT SWALLOWING EVALUATION    ASSESSMENT    ASSESSMENT/OVERALL IMPRESSION:  Patient is an 80year-old female who was admitted to ED from nursing home on 3/22/2021 with c/o chronic foot wounds.  PMHx includes but not limited to dysphagia, esophageal reflux, Administration Recommendations: Crushed in puree  Treatment Plan/Recommendations: No further inpatient SLP service warranted  Discharge Recommendations/Plan: Undetermined    HISTORY   MEDICAL HISTORY  Reason for Referral: Altered diet consistency    Proble Elevation Timing: Appears intact  Laryngeal Elevation Strength: Appears impaired  Laryngeal Elevation Coordination: Appears intact  (Please note: Silent aspiration cannot be evaluated clinically.  Videofluoroscopic Swallow Study is required to rule-out sile

## 2021-03-23 NOTE — H&P
34 [facility] Patient Status:  Inpatient    1935 MRN [id number]   [facility] 3SW-A Attending Roberto Carlos Crowley MD   Hosp Day # 1 PCP Martínez Dinh MD     Date:  3/23/2021  Date of Admission:  3 But he is well aware of overall advanced end-stage dementia with poor prognosis riddled with multiple complications and hospitalization and risk of these ulceration getting worse with new ulcers with risk of infection, sepsis, and possible death.   Please r Alcohol use: Not Currently      Comment: social    Drug use: No    Allergies/Medications: Allergies:   Ativan [Lorazepam]        Ciprofloxacin Hcl       NAUSEA ONLY  collagenase (SANTYL) 250 UNIT/GM External Ointment, Apply topically daily.   Levothyroxin °C)  Pulse:  [63-97] 64  Resp:  [14-20] 20  BP: (116-151)/(67-87) 148/67    Intake/Output:    Intake/Output Summary (Last 24 hours) at 3/23/2021 1105  Last data filed at 3/23/2021 0904  Gross per 24 hour   Intake 120 ml   Output —   Net 120 ml       Exam <0.046 12/31/2017     03/18/2021    B12 371 01/31/2021       XR FOOT, COMPLETE (MIN 3 VIEWS), RIGHT (CPT=73630)    Result Date: 3/22/2021  CONCLUSION:  1.  Findings concerning for osteomyelitis adjacent to the 5th metatarsal phalangeal joint as detail List:     Essential hypertension     Acute laryngitis, without mention of obstruction     Anxiety state, unspecified     Urinary tract infection, site not specified     Dysuria     Unspecified vitamin deficiency     Diarrhea     Essential hypertension, amanda podiatry–\"Partial 5th ray amputation would leave incision about the area much larger than current wound. With her sacral wound and the need to lie on her side, there is risk for dehiscence of surgery site and possible worsened scenario.     At this time w reviewed  PT and/or OT  Margot Laughlin MD  3/23/2021

## 2021-03-23 NOTE — CONSULTS
[facility]  Report of Inpatient Wound Care Consultation     Carmella Arroyo Patient Status:  Inpatient    1935 MRN [id number]   [facility] 3SW-A Attending Wilfred Arthur MD   Hosp Day # 1 PCP Nicole Kilpatrick MD     [facility] 03/23/21  0826   WBC 5.2  --  10.5 7.0   HGB 8.5*  --  10.0* 8.9*   HCT 27.4*  --  30.9* 28.9*   .0  --  307.0 250.0   K 3.6  --  4.2 3.8   CK  --  152  --   --    CREATSERUM 0.92  --  1.02 0.84   BUN 19*  --  17 14   GLU 70  --  94 86   CA 8.1*  -- 24591 [phone]  Inpatient Wound Care Pager #[phone]

## 2021-03-23 NOTE — CM/SW NOTE
03/23/21 1500   CM/SW Referral Data   Referral Source Physician   Reason for Referral Discharge planning   Informant Children   Social History   Recreational Drug/Alcohol Use no   Major Changes Last 6 Months no   Domestic/Partner Violence no   Suicidal [employer] Per Francisco Kaplan at [facility], the children often have conflicting views when it comes to medical decision making. Palliative Care will be seeing the patient upon this admission. She will be receiving IV abx.   She is current with

## 2021-03-23 NOTE — PLAN OF CARE
Discussed plan of care with dgtr, Marc Genao. She agrees with non-surgical management. She does not wish to pursue any surgical interventions due to poor healing potential. Will monitor.

## 2021-03-23 NOTE — DIETARY MALNUTRITION NOTE
[facility]  NUTRITION ASSESSMENT    Pt meets chronic - moderate malnutrition criteria at this time.      CRITERIA FOR MALNUTRITION DIAGNOSIS:  Criteria for non-severe malnutrition diagnosis: acute illness/injury related to wt loss 7.5% in 3 months, bod Regular/General diet Is Patient on Accuchecks? No  Oral Supplements: None at this time    FOOD/NUTRITION RELATED HISTORY:   Appetite: Poor  Intake: <50%  Intake Meeting Needs: No  Food Allergies: No  Cultural/Ethnic/Yazdanism Preferences Addresses:  Yes

## 2021-03-23 NOTE — CONSULTS
INFECTIOUS DISEASE CONSULTATION    Sree Christensen Patient Status:  Inpatient    1935 MRN [id number]   [facility] 3SW-A Attending Adelaide Yee MD   [facility] Day # 1 PCP Rosa Del Rosario, ONLY    Medications:    Current Facility-Administered Medications:   •  acetaminophen (TYLENOL) tab 650 mg, 650 mg, Oral, Q6H PRN  •  bisacodyl (DULCOLAX) rectal suppository 10 mg, 10 mg, Rectal, Daily PRN  •  clonazePAM (KLONOPIN) tab 0.25 mg, 0.25 mg, Or daily for 8 days. , Disp: 16 tablet, Rfl: 0  acetaminophen 325 MG Oral Tab, Take 650 mg by mouth every 4 (four) hours as needed for Pain., Disp: , Rfl:   clonazePAM 0.5 MG Oral Tab, Take 0.5 tablets (0.25 mg total) by mouth nightly., Disp: 30 tablet, Rfl: 0 list:  Patient Active Problem List:     Essential hypertension     Acute laryngitis, without mention of obstruction     Anxiety state, unspecified     Urinary tract infection, site not specified     Dysuria     Unspecified vitamin deficiency     Diarrhea

## 2021-03-23 NOTE — PLAN OF CARE
Pt a/o x self. RA/. Off loading boots to bilateral heels on. Strict turn q2hrs. Pt has been sleeping all shift. Awakes when repositioning. Pt incontinent of urine  and stool. Voiding in brief. Last bowel movement 3/22. IVF infusing.  Pt has multiple woun

## 2021-03-23 NOTE — CONSULTS
[id number] [facility] Patient Status:  Inpatient    1935 MRN [id number]   [facility] 3SW-A Attending Josh Landeros MD   Hosp Day # 1 PCP Hugo Morrissey MD     Date of Consult: 3/23/2021 though amputation bec of concern of healing. She is aware that pt will need IV antibiotics on discharge. -- she also voiced understanding that pt's sacral wound cannot heal bec of her nutritional status and her being bed bound.    --She mentioned that she Consciosness  100   Full         Normal                      Full               Normal                         Full           No Disease            90     Full         Normal                      Full                   Normal clonazePAM (KLONOPIN) tab 0.25 mg, 0.25 mg, Oral, Nightly  •  escitalopram (LEXAPRO) tablet 10 mg, 10 mg, Oral, Daily  •  ferrous sulfate EC tab 325 mg, 325 mg, Oral, TID CC  •  Levothyroxine Sodium tab 75 mcg, 75 mcg, Oral, Before breakfast  •  0.9% NaCl distress.   Lungs: Normal excursions and effort  Abdomen: Soft, non-tender, non-distended, normal bowel sounds X 4 quadrants, no rebound or guarding  Extremities:b/l foot dressing   Neurologic: sleeping at time of visit, advanced dementia   Psychiatric:no a

## 2021-03-23 NOTE — PLAN OF CARE
Pt appears to be resting comfortably. VSS. Repositioned frequently. Voiding per brief. ID to see. Zosyn infusing per orders. Plan: TBD, eventual back to MBM. Will monitor.

## 2021-03-23 NOTE — PROGRESS NOTES
Spoke with Erlin Yoder will attempt to come see pt tonight requested supplies to be at bedside with anaerobic and aerobic swabs for cultures.

## 2021-03-24 LAB
ALBUMIN SERPL-MCNC: 2.4 G/DL (ref 3.4–5)
ALBUMIN/GLOB SERPL: 0.6 {RATIO} (ref 1–2)
ALP LIVER SERPL-CCNC: 66 U/L
ALT SERPL-CCNC: 26 U/L
ANION GAP SERPL CALC-SCNC: 7 MMOL/L (ref 0–18)
AST SERPL-CCNC: 17 U/L (ref 15–37)
BASOPHILS # BLD AUTO: 0.06 X10(3) UL (ref 0–0.2)
BASOPHILS NFR BLD AUTO: 0.7 %
BILIRUB SERPL-MCNC: 0.3 MG/DL (ref 0.1–2)
BUN BLD-MCNC: 11 MG/DL (ref 7–18)
BUN/CREAT SERPL: 12 (ref 10–20)
CALCIUM BLD-MCNC: 8.6 MG/DL (ref 8.5–10.1)
CHLORIDE SERPL-SCNC: 107 MMOL/L (ref 98–112)
CO2 SERPL-SCNC: 24 MMOL/L (ref 21–32)
CREAT BLD-MCNC: 0.92 MG/DL
DEPRECATED RDW RBC AUTO: 55.2 FL (ref 35.1–46.3)
EOSINOPHIL # BLD AUTO: 0.16 X10(3) UL (ref 0–0.7)
EOSINOPHIL NFR BLD AUTO: 1.8 %
ERYTHROCYTE [DISTWIDTH] IN BLOOD BY AUTOMATED COUNT: 14.9 % (ref 11–15)
GLOBULIN PLAS-MCNC: 3.9 G/DL (ref 2.8–4.4)
GLUCOSE BLD-MCNC: 119 MG/DL (ref 70–99)
HCT VFR BLD AUTO: 33.1 %
HGB BLD-MCNC: 10.6 G/DL
IMM GRANULOCYTES # BLD AUTO: 0.05 X10(3) UL (ref 0–1)
IMM GRANULOCYTES NFR BLD: 0.6 %
LYMPHOCYTES # BLD AUTO: 1.9 X10(3) UL (ref 1–4)
LYMPHOCYTES NFR BLD AUTO: 21.6 %
M PROTEIN MFR SERPL ELPH: 6.3 G/DL (ref 6.4–8.2)
MCH RBC QN AUTO: 32 PG (ref 26–34)
MCHC RBC AUTO-ENTMCNC: 32 G/DL (ref 31–37)
MCV RBC AUTO: 100 FL
MONOCYTES # BLD AUTO: 0.49 X10(3) UL (ref 0.1–1)
MONOCYTES NFR BLD AUTO: 5.6 %
NEUTROPHILS # BLD AUTO: 6.13 X10 (3) UL (ref 1.5–7.7)
NEUTROPHILS # BLD AUTO: 6.13 X10(3) UL (ref 1.5–7.7)
NEUTROPHILS NFR BLD AUTO: 69.7 %
OSMOLALITY SERPL CALC.SUM OF ELEC: 287 MOSM/KG (ref 275–295)
PLATELET # BLD AUTO: 289 10(3)UL (ref 150–450)
POTASSIUM SERPL-SCNC: 3.8 MMOL/L (ref 3.5–5.1)
RBC # BLD AUTO: 3.31 X10(6)UL
SODIUM SERPL-SCNC: 138 MMOL/L (ref 136–145)
WBC # BLD AUTO: 8.8 X10(3) UL (ref 4–11)

## 2021-03-24 NOTE — PROGRESS NOTES
Family declined PICC line placement at this time. Asked for MD following patient to reach out and discuss different options outside of IV abx.   Per conversation with son, Mark Felton, not interested in discussing hospice at this time until all available options

## 2021-03-24 NOTE — VASCULAR ACCESS
VASCULAR NOTE:  Spoke with daughter, Radha Brown, who did not want any invasive procedures completed on her mother. Would not give consent for PICC. Report given to Augusto, primary care RN. Spoke with Dr Bruce Radford.   Will revisit options for patient w

## 2021-03-24 NOTE — PROGRESS NOTES
[facility]  Progress Note    Abraham Dunaway Patient Status:  Inpatient    1935 MRN [id number]   [facility] 3SW-A Attending Jaja Rubio MD   Hosp Day # 2 PCP Mary Hare MD         SUBJECTIVE:  Subjective:  Abraham Dunaway limited exam, contractures, end-stage dementia with failure to thrive  Skin–I would defer any assessment, management, precautions, follow-up, treatment to in-house wound team and nursing as wound care is beyond the realm of my speciality limits evaluation of the osseous structures. Mild multilevel joint space narrowing suggests degenerative change. Slight soft tissue irregularity adjacent to the base of the 5th metatarsal and 5th metatarsal phalangeal joint.   More decreased bone minerali unspecified type     Hypernatremia     Anemia     Azotemia     Hyperglycemia     Acute cystitis with hematuria     Acute kidney injury (HCC)     Severe dehydration     COVID-19     Anemia, unspecified type     Gastrointestinal hemorrhage, unspecified gastr Kael Salomon, please refer to my detailed discussion family conference note from yesterday that is 3/22  Per palliative care MD discussion–----\"Goals of Care Discussion:   I discussed reason for palliative care consultation with patient's son. therapist     **Certification        PHYSICIAN Certification of Need for Inpatient Hospitalization - Initial Certification     Patient will require inpatient services that will reasonably be expected to span two midnight's based on the clinical documentati

## 2021-03-24 NOTE — PROGRESS NOTES
[facility]                INFECTIOUS DISEASE PROGRESS NOTE    Ginette Jewel Patient Status:  Inpatient    1935 MRN [id number]   [facility] 3SW-A Attending Tereza York MD   Hosp Day # 2 PCP Magaly Fung MD     Antibiot BACTERIAL CULTURE     Status: Abnormal (Preliminary result)    Collection Time: 03/22/21  3:06 PM    Specimen: Foot;  Other   Result Value Ref Range    Aerobic Culture Result 2+ growth Enterobacter cloacae (A) N/A    Aerobic Smear No WBCs seen N/A    Kimi Florez dementia ([facility] [address]     Pressure injury of sacral region, stage 3 (HCC)     Acute cystitis without hematuria     Osteomyelitis of right foot (HCC)     Osteomyelitis of right foot, unspecified type ([facility])        ASSESSMENT/PLAN:  1.  Osteomyelitis sp excicional de

## 2021-03-24 NOTE — PLAN OF CARE
Pt a/ox self. RA/. VSS. SCD/bilateral heel protectors. Pt tolerated eating few bites of apple sauce. Pt incontinent of urine and stool. Voiding in brief. Last bowel movement 3/24.  Pt has been sleeping most of the shift arouses when repositioned but goe

## 2021-03-24 NOTE — PLAN OF CARE
Pt alert to self baseline, speech incomprehensible. R.A. resting comfortably without signs of pain. Pt turned q2, contractures to all extremities. Pillow between knees. Dressing to sacrum, bilateral hips, and bilateral feet changed. VS remain stable.   I

## 2021-03-25 LAB
BASOPHILS # BLD AUTO: 0.05 X10(3) UL (ref 0–0.2)
BASOPHILS NFR BLD AUTO: 0.6 %
DEPRECATED RDW RBC AUTO: 54.4 FL (ref 35.1–46.3)
EOSINOPHIL # BLD AUTO: 0.31 X10(3) UL (ref 0–0.7)
EOSINOPHIL NFR BLD AUTO: 3.9 %
ERYTHROCYTE [DISTWIDTH] IN BLOOD BY AUTOMATED COUNT: 15 % (ref 11–15)
HCT VFR BLD AUTO: 31.6 %
HGB BLD-MCNC: 10.1 G/DL
IMM GRANULOCYTES # BLD AUTO: 0.02 X10(3) UL (ref 0–1)
IMM GRANULOCYTES NFR BLD: 0.3 %
LYMPHOCYTES # BLD AUTO: 2.67 X10(3) UL (ref 1–4)
LYMPHOCYTES NFR BLD AUTO: 33.5 %
MCH RBC QN AUTO: 32.1 PG (ref 26–34)
MCHC RBC AUTO-ENTMCNC: 32 G/DL (ref 31–37)
MCV RBC AUTO: 100.3 FL
MONOCYTES # BLD AUTO: 0.33 X10(3) UL (ref 0.1–1)
MONOCYTES NFR BLD AUTO: 4.1 %
NEUTROPHILS # BLD AUTO: 4.6 X10 (3) UL (ref 1.5–7.7)
NEUTROPHILS # BLD AUTO: 4.6 X10(3) UL (ref 1.5–7.7)
NEUTROPHILS NFR BLD AUTO: 57.6 %
PLATELET # BLD AUTO: 320 10(3)UL (ref 150–450)
RBC # BLD AUTO: 3.15 X10(6)UL
WBC # BLD AUTO: 8 X10(3) UL (ref 4–11)

## 2021-03-25 RX ORDER — LEVOFLOXACIN 250 MG/1
250 TABLET ORAL DAILY
Qty: 28 TABLET | Refills: 0 | Status: SHIPPED | OUTPATIENT
Start: 2021-03-25 | End: 2021-04-22

## 2021-03-25 RX ORDER — METRONIDAZOLE 500 MG/1
250 TABLET ORAL 2 TIMES DAILY
Qty: 28 TABLET | Refills: 0 | Status: SHIPPED | OUTPATIENT
Start: 2021-03-25 | End: 2021-04-22

## 2021-03-25 NOTE — PROGRESS NOTES
[facility]  Progress Note    [place] Patient Status:  Inpatient    1935 MRN [id number]   [facility] 3SW-A Attending Radha La MD   Hosp Day # 3 PCP Kranthi Ortega MD         SUBJECTIVE:  Subjective:  [place] CO2 23.0 23.0 24.0   BUN 17 14 11   CREATSERUM 1.02 0.84 0.92   CA 8.9 8.6 8.6   GLU 94 86 119*       Recent Labs   Lab 03/22/21  1506 03/23/21  0826 03/24/21  0550   ALT 31 35 26   AST 35 32 17   ALB 2.5* 2.4* 2.4*       No results for input(s): PGLU in adjacent to the 5th metatarsal phalangeal joint concerning for osteomyelitis. No dislocation. CONCLUSION:  1. Findings concerning for osteomyelitis adjacent to the 5th metatarsal phalangeal joint as detailed above.   MRI of the right foot can be of proximal vein of right lower extremity (HCC)     Folic acid deficiency     Acute deep vein thrombosis (DVT) of popliteal vein of right lower extremity (HCC)     Goals of care, counseling/discussion     Palliative care by specialist     Counseling regard heparin     Hypothyroid–levothyroxine     Anemia–multifactorial, anemia of chronic disease and iron deficiency–iron sulfate     Malnourishment due to poor p.o. intake due to dementia–diet per speech and nutritionist to follow  Please note patient's POA Alec

## 2021-03-25 NOTE — PLAN OF CARE
Pt alert to self baseline hx of dementia. RA/. Bilateral heel protectors in place. Pt incontinent of bladder and bowel. Voiding in brief. Last bowel movement 3/24. Turn q2hrs. Pt able to eat pudding this evening no sign of dypagia noted. IVF infusing.  VS

## 2021-03-25 NOTE — CM/SW NOTE
Spoke with pt's RN who stated pt can discharge to NH on oral antibiotics today. Spoke with pt's dtr, Joo Dillon ([phone]) who expressed concern about plan for oral antibiotics.   She stated she would like to speak with MD about IV vs oral antibiotic and t

## 2021-03-25 NOTE — PROGRESS NOTES
[facility]                INFECTIOUS DISEASE PROGRESS NOTE    Ginette Jewel Patient Status:  Inpatient    1935 MRN [id number]   [facility] 3SW-A Attending Tereza York MD   Hosp Day # 3 PCP Magaly Fung MD     Antibiot Status: Abnormal    Collection Time: 03/22/21  3:06 PM    Specimen: Foot;  Other   Result Value Ref Range    Aerobic Culture Result 2+ growth Enterobacter cloacae (A) N/A    Aerobic Smear No WBCs seen N/A    Aerobic Smear No organisms seen N/A       Suscept vein thrombosis (DVT) of proximal vein of right lower extremity (HCC)     Folic acid deficiency     Acute deep vein thrombosis (DVT) of popliteal vein of right lower extremity (HCC)     Goals of care, counseling/discussion     Palliative care by specialist

## 2021-03-25 NOTE — PLAN OF CARE
Dr. Colt Irvin aware that family wanted to speak to him this afternoon.   Dr. Colt Irvin attempted to call daughter Denny Zavala, but there was no answer at approx 3:30/4:00pm.

## 2021-03-25 NOTE — PLAN OF CARE
Pt remains confused, hx dementia. Repositioned q2h. Dressings changed as per order and prn. Good appetite. Tolerates pureed food and nectar thick liquid. Receiving IV antibiotic as ordered. Incontinent of urine. Large BM. Family updated on poc.

## 2021-03-25 NOTE — PLAN OF CARE
Family called back to say they spoke to Dr. Marc Romero. They are OK to have pt discharged back to NH on oral antibiotics, but want to speak to podiatrist first.  DR. Ann Jean paged and notified. States she will call family now.

## 2021-03-26 VITALS
OXYGEN SATURATION: 94 % | WEIGHT: 97 LBS | DIASTOLIC BLOOD PRESSURE: 76 MMHG | BODY MASS INDEX: 18 KG/M2 | HEART RATE: 88 BPM | RESPIRATION RATE: 18 BRPM | SYSTOLIC BLOOD PRESSURE: 141 MMHG | TEMPERATURE: 97 F

## 2021-03-26 LAB
ANION GAP SERPL CALC-SCNC: 6 MMOL/L (ref 0–18)
BASOPHILS # BLD AUTO: 0.05 X10(3) UL (ref 0–0.2)
BASOPHILS NFR BLD AUTO: 0.7 %
BUN BLD-MCNC: 9 MG/DL (ref 7–18)
BUN/CREAT SERPL: 12.9 (ref 10–20)
CALCIUM BLD-MCNC: 8.8 MG/DL (ref 8.5–10.1)
CHLORIDE SERPL-SCNC: 109 MMOL/L (ref 98–112)
CO2 SERPL-SCNC: 25 MMOL/L (ref 21–32)
CREAT BLD-MCNC: 0.7 MG/DL
DEPRECATED RDW RBC AUTO: 54.6 FL (ref 35.1–46.3)
EOSINOPHIL # BLD AUTO: 0.26 X10(3) UL (ref 0–0.7)
EOSINOPHIL NFR BLD AUTO: 3.8 %
ERYTHROCYTE [DISTWIDTH] IN BLOOD BY AUTOMATED COUNT: 14.8 % (ref 11–15)
GLUCOSE BLD-MCNC: 92 MG/DL (ref 70–99)
HCT VFR BLD AUTO: 30.4 %
HGB BLD-MCNC: 9.8 G/DL
IMM GRANULOCYTES # BLD AUTO: 0.03 X10(3) UL (ref 0–1)
IMM GRANULOCYTES NFR BLD: 0.4 %
LYMPHOCYTES # BLD AUTO: 2.42 X10(3) UL (ref 1–4)
LYMPHOCYTES NFR BLD AUTO: 35.7 %
MCH RBC QN AUTO: 32.2 PG (ref 26–34)
MCHC RBC AUTO-ENTMCNC: 32.2 G/DL (ref 31–37)
MCV RBC AUTO: 100 FL
MONOCYTES # BLD AUTO: 0.41 X10(3) UL (ref 0.1–1)
MONOCYTES NFR BLD AUTO: 6 %
NEUTROPHILS # BLD AUTO: 3.61 X10 (3) UL (ref 1.5–7.7)
NEUTROPHILS # BLD AUTO: 3.61 X10(3) UL (ref 1.5–7.7)
NEUTROPHILS NFR BLD AUTO: 53.4 %
OSMOLALITY SERPL CALC.SUM OF ELEC: 288 MOSM/KG (ref 275–295)
PLATELET # BLD AUTO: 279 10(3)UL (ref 150–450)
POTASSIUM SERPL-SCNC: 4.1 MMOL/L (ref 3.5–5.1)
RBC # BLD AUTO: 3.04 X10(6)UL
SODIUM SERPL-SCNC: 140 MMOL/L (ref 136–145)
WBC # BLD AUTO: 6.8 X10(3) UL (ref 4–11)

## 2021-03-26 NOTE — PLAN OF CARE
Patient confused has dementia. Appeared comfortable at [facility]. Vitals stable. Wounds present to bilateral hips, bilateral feet, and sacrum. Dressings are CDI, changed per orders on previous shift. Incontinent bowel and bladder, repositioning q2 hours.  BM x2 o

## 2021-03-26 NOTE — PROGRESS NOTES
[facility]  Progress Note    Shannon Brenda Patient Status:  Inpatient    1935 MRN [id number]   [facility] 3SW-A Attending Nelson Ford MD   [facility] Day # 4 PCP Jenn Kyle MD         SUBJECTIVE:  Subjective:  Shannon Gutierrez 03/23/21  0826 03/24/21  0550   ALT 31 35 26   AST 35 32 17   ALB 2.5* 2.4* 2.4*       No results for input(s): PGLU in the last 168 hours. No results for input(s): URINE, CULTI, BLDSMR in the last 168 hours. Hospital Encounter on 03/22/21   1.  AEROB for osteomyelitis adjacent to the 5th metatarsal phalangeal joint as detailed above. MRI of the right foot can be performed for further evaluation as clinically indicated.    Dictated by (CST): Nelson Meyer MD on 3/22/2021 at 3:39 PM     Finalized by (CST): lower extremity ([address]     Goals of care, counseling/discussion     Palliative care by specialist     Counseling regarding advance care planning and goals of care     Severe dementia ([address]     Pressure injury of sacral region, stage 3 ([address]     Acute cystit poor p.o. intake due to dementia–diet per speech and nutritionist to follow  Please note patient's Allierchristian Phylicia has refused PEG tube in prior admission as well as in my last discussion with him on 3/22     Dysphagia–aspiration precaution and diet per speech th

## 2021-03-26 NOTE — PLAN OF CARE
Alert to person only,all extremities contracted, repositioned 2 hrs ATC, dsg changes to sacrum, bilat hips & Rt foot done prior to dc, Tylenol ordered for pain, IV abx infusing but will be on po abx at [facility]- scripts in chart, daughter updated per ALICIA, fall p

## 2021-03-26 NOTE — CM/SW NOTE
Attempted to reach pt's dtrAlisa to discuss DC planning and possible return to NH today - message left. Updates sent to [facility] via Sherlene [facility]. Facility confirmed pt can be accepted at DC. Await medical clearance for discharge.   Jamilah Baer

## 2021-03-28 NOTE — DISCHARGE SUMMARY
[facility]  Discharge Summary    Elena De Jesus Patient Status:  Inpatient    1935 MRN [id number]   [facility] 3SW-A Attending No att. providers found   [facility] Day # 4 PCP Anders Tamayo MD     Date of Admission: 3/22/2021    Date region, stage 3 ([facility] [address]     Acute cystitis without hematuria     Osteomyelitis of right foot ([facility] [address]     Osteomyelitis of right foot, unspecified type [facility])    Hospital Encounter on 03/22/21   1.  AEROBIC BACTERIAL CULTURE     Status: Abnormal    Collection Ceasar Chamberlain MRI of the right foot can be performed for further evaluation as clinically indicated.    Dictated by (CST): Delaney Luis MD on 3/22/2021 at 3:39 PM     Finalized by (CST): Delaney Luis MD on 3/22/2021 at 3:43 PM       Reason for Admission: see below    Physic to chart for details  Procedures:  Please refer to chart for details    Disposition: SNF    Discharge Condition: Stable    Discharge Medications: Discharge Medication List as of 3/26/2021  4:07 PM    START taking these medications    levofloxacin 250 MG Or their recommendation/ refer to chart for details. 3 days    Kirby [facility]  3/28/2021  10:47 AM

## 2022-04-06 ENCOUNTER — HOSPITAL ENCOUNTER (EMERGENCY)
Facility: HOSPITAL | Age: 87
Discharge: HOME | End: 2022-04-06
Attending: EMERGENCY MEDICINE
Payer: MEDICARE

## 2022-04-06 VITALS
BODY MASS INDEX: 18 KG/M2 | SYSTOLIC BLOOD PRESSURE: 103 MMHG | WEIGHT: 97 LBS | RESPIRATION RATE: 15 BRPM | TEMPERATURE: 98 F | HEART RATE: 86 BPM | DIASTOLIC BLOOD PRESSURE: 84 MMHG | OXYGEN SATURATION: 100 %

## 2022-04-06 DIAGNOSIS — E86.0 DEHYDRATION: ICD-10-CM

## 2022-04-06 DIAGNOSIS — E88.09 HYPOALBUMINEMIA DUE TO PROTEIN-CALORIE MALNUTRITION (HCC): ICD-10-CM

## 2022-04-06 DIAGNOSIS — N17.9 AKI (ACUTE KIDNEY INJURY) (HCC): ICD-10-CM

## 2022-04-06 DIAGNOSIS — D64.9 CHRONIC ANEMIA: ICD-10-CM

## 2022-04-06 DIAGNOSIS — E87.0 HYPERNATREMIA: Primary | ICD-10-CM

## 2022-04-06 DIAGNOSIS — E46 HYPOALBUMINEMIA DUE TO PROTEIN-CALORIE MALNUTRITION (HCC): ICD-10-CM

## 2022-04-06 DIAGNOSIS — F03.90 DEMENTIA WITHOUT BEHAVIORAL DISTURBANCE, UNSPECIFIED DEMENTIA TYPE (HCC): ICD-10-CM

## 2022-04-06 LAB
ALBUMIN SERPL-MCNC: 1.3 G/DL (ref 3.4–5)
ALBUMIN/GLOB SERPL: 0.3 {RATIO} (ref 1–2)
ALP LIVER SERPL-CCNC: 119 U/L
ALT SERPL-CCNC: 6 U/L
ANION GAP SERPL CALC-SCNC: 3 MMOL/L (ref 0–18)
AST SERPL-CCNC: 8 U/L (ref 15–37)
ATRIAL RATE: 80 BPM
BASOPHILS # BLD AUTO: 0.01 X10(3) UL (ref 0–0.2)
BASOPHILS NFR BLD AUTO: 0.1 %
BILIRUB SERPL-MCNC: 0.3 MG/DL (ref 0.1–2)
BUN BLD-MCNC: 54 MG/DL (ref 7–18)
CALCIUM BLD-MCNC: 8.9 MG/DL (ref 8.5–10.1)
CHLORIDE SERPL-SCNC: 133 MMOL/L (ref 98–112)
CO2 SERPL-SCNC: 25 MMOL/L (ref 21–32)
CREAT BLD-MCNC: 1.71 MG/DL
EOSINOPHIL # BLD AUTO: 0 X10(3) UL (ref 0–0.7)
EOSINOPHIL NFR BLD AUTO: 0 %
ERYTHROCYTE [DISTWIDTH] IN BLOOD BY AUTOMATED COUNT: 18.5 %
GLOBULIN PLAS-MCNC: 5.2 G/DL (ref 2.8–4.4)
GLUCOSE BLD-MCNC: 122 MG/DL (ref 70–99)
HCT VFR BLD AUTO: 24.9 %
HGB BLD-MCNC: 7.3 G/DL
IMM GRANULOCYTES # BLD AUTO: 0.06 X10(3) UL (ref 0–1)
IMM GRANULOCYTES NFR BLD: 0.6 %
LYMPHOCYTES # BLD AUTO: 1.76 X10(3) UL (ref 1–4)
LYMPHOCYTES NFR BLD AUTO: 17.1 %
MAGNESIUM SERPL-MCNC: 2.7 MG/DL (ref 1.6–2.6)
MCH RBC QN AUTO: 30.5 PG (ref 26–34)
MCHC RBC AUTO-ENTMCNC: 29.3 G/DL (ref 31–37)
MCV RBC AUTO: 104.2 FL
MONOCYTES # BLD AUTO: 0.31 X10(3) UL (ref 0.1–1)
MONOCYTES NFR BLD AUTO: 3 %
NEUTROPHILS # BLD AUTO: 8.16 X10 (3) UL (ref 1.5–7.7)
NEUTROPHILS # BLD AUTO: 8.16 X10(3) UL (ref 1.5–7.7)
NEUTROPHILS NFR BLD AUTO: 79.2 %
OSMOLALITY SERPL CALC.SUM OF ELEC: 348 MOSM/KG (ref 275–295)
P AXIS: 84 DEGREES
P-R INTERVAL: 130 MS
PLATELET # BLD AUTO: 326 10(3)UL (ref 150–450)
POTASSIUM SERPL-SCNC: 3.6 MMOL/L (ref 3.5–5.1)
PROT SERPL-MCNC: 6.5 G/DL (ref 6.4–8.2)
Q-T INTERVAL: 306 MS
QRS DURATION: 84 MS
QTC CALCULATION (BEZET): 352 MS
R AXIS: 30 DEGREES
RBC # BLD AUTO: 2.39 X10(6)UL
SODIUM SERPL-SCNC: 161 MMOL/L (ref 136–145)
T AXIS: 45 DEGREES
VENTRICULAR RATE: 80 BPM
WBC # BLD AUTO: 10.3 X10(3) UL (ref 4–11)

## 2022-04-06 PROCEDURE — 96361 HYDRATE IV INFUSION ADD-ON: CPT

## 2022-04-06 PROCEDURE — 80053 COMPREHEN METABOLIC PANEL: CPT | Performed by: EMERGENCY MEDICINE

## 2022-04-06 PROCEDURE — 93010 ELECTROCARDIOGRAM REPORT: CPT

## 2022-04-06 PROCEDURE — 99284 EMERGENCY DEPT VISIT MOD MDM: CPT

## 2022-04-06 PROCEDURE — 96360 HYDRATION IV INFUSION INIT: CPT

## 2022-04-06 PROCEDURE — 93005 ELECTROCARDIOGRAM TRACING: CPT

## 2022-04-06 PROCEDURE — 83735 ASSAY OF MAGNESIUM: CPT | Performed by: EMERGENCY MEDICINE

## 2022-04-06 PROCEDURE — 85025 COMPLETE CBC W/AUTO DIFF WBC: CPT | Performed by: EMERGENCY MEDICINE

## 2022-04-06 RX ORDER — SODIUM CHLORIDE 9 MG/ML
125 INJECTION, SOLUTION INTRAVENOUS CONTINUOUS
Status: DISCONTINUED | OUTPATIENT
Start: 2022-04-06 | End: 2022-04-06

## 2023-03-17 NOTE — PLAN OF CARE
Impaired Functional Mobility    • Achieve highest/safest level of mobility/gait Not Progressing          NEUROLOGICAL - ADULT    • Achieves stable or improved neurological status Progressing        Patient/Family Goals    • Patient/Family Long Term Goal Pr Snoring

## 2023-03-22 NOTE — PROGRESS NOTES
His wife reports he needs his metformin refilled to [facility]    [facility]  Progress Note     Lidia Cordova Patient Status:  Inpatient    1935 MRN [id number]   [facility] 5NW-A Attending Morro Tse MD   Hosp Day # 8 PCP Liam Colindres MD     Chief Complaint: increased confusion    S: P atorvastatin  20 mg Oral Nightly   • escitalopram  10 mg Oral Daily   • Levothyroxine Sodium  50 mcg Oral Before breakfast   • Memantine HCl  10 mg Oral BID       ASSESSMENT / PLAN:     1.  suspected UTI-rt ureteral Stent in place since November - finishe

## 2023-05-26 NOTE — SLP NOTE
ADULT SWALLOWING EVALUATION    ASSESSMENT    ASSESSMENT/OVERALL IMPRESSION:  Patient seen to reassess swallow function. Patient alert though tended to keep her eyes closed for much of the eval.  She was verbal but statements were not appropriate.   She did type  Active Problems:    Anemia    Gastrointestinal hemorrhage, unspecified gastrointestinal hemorrhage type    Dehydration    Chronic deep vein thrombosis (DVT) of proximal vein of right lower extremity [facility])      Past Medical History  Past Medical Histo EXAMINATION  Dentition: Functional  Symmetry: Unable to assess  Strength: Unable to assess  Tone: Unable to assess  Range of Motion: Unable to assess  Rate of Motion: Unable to assess    Voice Quality: Clear  Respiratory Status: Unlabored  Consistencies Tr 3033    Prior to entering room, SLP donned appropriate PPE for Patient level of isolation including gown, gloves, and eyewear. Patient was not masked. No

## 2023-08-31 NOTE — ANESTHESIA PROCEDURE NOTES
Airway  Urgency: elective    Airway not difficult    General Information and Staff    Patient location during procedure: OR  Anesthesiologist: Alyse Arroyo MD  Resident/CRNA: Nadya Stark CRNA  Performed: CRNA     Indications and Patient Condition 23

## (undated) DEVICE — ABDOMINAL PAD: Brand: DERMACEA

## (undated) DEVICE — STERILE SYNTHETIC POLYISOPRENE POWDER-FREE SURGICAL GLOVES WITH HYDROGEL COATING: Brand: PROTEXIS

## (undated) DEVICE — TIGERTAIL 5F FLXTIP 70CM

## (undated) DEVICE — SOL  .9 3000ML

## (undated) DEVICE — LIGHT HANDLE

## (undated) DEVICE — CHLORAPREP 26ML APPLICATOR

## (undated) DEVICE — Device

## (undated) DEVICE — ZIPWIRE GUIDEWIRE .038X150 STR

## (undated) DEVICE — KENDALL SCD EXPRESS SLEEVES, KNEE LENGTH, MEDIUM: Brand: KENDALL SCD

## (undated) DEVICE — 3M(TM) MICROPORE TAPE DISPENSER 1535-2: Brand: 3M™ MICROPORE™

## (undated) DEVICE — CYSTO CDS-LF: Brand: MEDLINE INDUSTRIES, INC.

## (undated) DEVICE — SYSTEM PUMPING SINGLE ACTION

## (undated) DEVICE — SOL  .9 1000ML BTL

## (undated) DEVICE — LAPAROTOMY CDS: Brand: MEDLINE INDUSTRIES, INC.

## (undated) DEVICE — SPNG DRESS 8X4IN NLTX STRL 12

## (undated) DEVICE — STERILE LATEX POWDER-FREE SURGICAL GLOVES WITH HYDROGEL COATING, SMOOTH FINISH, STRAIGHT FINGER: Brand: PROTEXIS

## (undated) NOTE — LETTER
[address]audia [address]  [address] [facility]  Authorization for Surgical Operation and Procedure     Date:___________                                                                                                         Time:__________ 4.   Should the need arise during my operation or immediate post-operative period, I also consent to the administration of blood and/or blood products.   Further, I understand that despite careful testing and screening of blood or blood products by nona 8.   I recognize that in the event my procedure results in extended X-Ray/fluoroscopy time, I may develop a skin reaction. 9.  If I have a Do Not Attempt Resuscitation (DNAR) order in place, that status will be suspended while in the operating room, proc 1. IJosy agree to be cared for by an anesthesiologist, who is specially trained to monitor me and give me medicine to put me to sleep or keep me comfortable during my procedure    I understand that my anesthesiologist is not an employee or ag 5. My doctor has explained to me other choices available to me for my care (alternatives).   6. Pregnant Patients (“epidural”):  I understand that the risks of having an epidural (medicine given into my back to help control pain during labor), include itchi

## (undated) NOTE — IP AVS SNAPSHOT
Patient Demographics     Address  [place] T[address]   [address] [address] Phone  [phone] [facility]) *Preferred*  [phone] [facility]) E-mail Address  [email or web address]      Emergency Contact(s)     Name Relation Home Work Mobile    joslyn burton cyanocobalamin 1000 MCG Tabs  Next dose due: Tomorrow morning      Take 1,000 mcg by mouth daily. Dulcolax 10 MG Supp  Generic drug: bisacodyl      Place 10 mg rectally Q24H PRN.           escitalopram 10 MG Tabs  Commonly known as: LEXAPRO  Next d each of these medications  levofloxacin 250 MG Tabs  metRONIDAZOLE 500 MG Tabs           [id number] - MAR ACTION REPORT  (last 24 hrs)    ** SITE UNKNOWN **     Order ID Medication Name Action Time Action Reason Comments    [id number] Levothyroxine Sodium ta [address] BARRON ([facility])    Specimen Information    Type Source Collected On   Blood — 03/26/21 0510          Components    Component Value Reference Range Flag Lab   Glucose 92 70 - 99 mg/dL — [facility])   Sodium 140 136 - 145 mm Culture FREQ X 2 [[id number]] Collected: 03/22/21 1506    Order Status: Completed Lab Status: Preliminary result Updated: 03/26/21 1600    Specimen: Blood,peripheral      Blood Culture Result No Growth 4 Days    Blood Culture FREQ X 2 [[id number]] Collected: Ubaldo Patient Status:  Inpatient    1935 MRN [id number]   [facility] 3SW-A Attending Pascale Parish MD   [facility] Day # 1 PCP Lennox Ahr, MD     Date:  3/23/2021  Date of Admission:  3/22/2021    History provided by:patient/ ER MD/NOTE dementia with poor prognosis riddled with multiple complications and hospitalization and risk of these ulceration getting worse with new ulcers with risk of infection, sepsis, and possible death.   Please refer to my detailed family conference note with 5 m Drug use: No    Allergies/Medications: Allergies:   Ativan [Lorazepam]        Ciprofloxacin Hcl       NAUSEA ONLY  collagenase (SANTYL) 250 UNIT/GM External Ointment, Apply topically daily.   Levothyroxine Sodium 75 MCG Oral Tab, Take 1 tablet (75 mcg tot (403-151)/(13-83) 148/67    Intake/Output:    Intake/Output Summary (Last 24 hours) at 3/23/2021 1105  Last data filed at 3/23/2021 0904  Gross per 24 hour   Intake 120 ml   Output —   Net 120 ml       Exam  General Appearance:   General condition poor, ba 01/31/2021       XR FOOT, COMPLETE (MIN 3 VIEWS), RIGHT (CPT=73630)    Result Date: 3/22/2021  CONCLUSION:  1. Findings concerning for osteomyelitis adjacent to the 5th metatarsal phalangeal joint as detailed above.   MRI of the right foot can be performed laryngitis, without mention of obstruction     Anxiety state, unspecified     Urinary tract infection, site not specified     Dysuria     Unspecified vitamin deficiency     Diarrhea     Essential hypertension, benign     Pernicious anemia     Unspecified h about the area much larger than current wound. With her sacral wound and the need to lie on her side, there is risk for dehiscence of surgery site and possible worsened scenario. At this time would favor local wound care and IV abx per ID.   Cultures pe 3/23/2021[SK.1]    Electronically signed by Jb Higgins MD on 3/23/2021 11:13 AM   Attribution Key    SK. 1 - Jb Higgins MD on 3/23/2021 11:05 AM  SK. 2 - Jb Higgins MD on 3/23/2021 11:12 AM               H&P signed by Jb Higgins MD at 3/2 further ulceration despite all possible and feasible medical and/or surgical modalities and or wound prevention measures currently readmitted at the request of podiatrist Dr. Lexi Gaspar at [facility] who spoke with our team as well as spoke with 10/2020   • DEBRIDEMENT OF SACRAL DECUBITUS ULCER N/A 3/16/2021    Performed by aKrey Deutsch MD at [facility] MAIN OR   • REMOVE BLADDER STONE,<2.5CM Right 02/04/2021    laser litho retained stent and right ureter stent removal     Family History   Problem [DISCONTINUED] Zinc Oxide 15 % External Cream, Apply 1 Application topically as needed. R buttock  escitalopram 10 MG Oral Tab, Take 10 mg by mouth daily. Review of Systems:   Pertinent items are noted in HPI.   A comprehensive 10 point review of sys HCT 28.9 (L) 03/23/2021    .0 03/23/2021    CREATSERUM 0.84 03/23/2021    BUN 14 03/23/2021     03/23/2021    K 3.8 03/23/2021     03/23/2021    CO2 23.0 03/23/2021    GLU 86 03/23/2021    CA 8.6 03/23/2021    ALB 2.4 (L) 03/23/2021    A the 5th metatarsal phalangeal joint as detailed above. MRI of the right foot can be performed for further evaluation as clinically indicated.    Dictated by (CST): Shelli Bernal MD on 3/22/2021 at 3:39 PM     Finalized by (CST): Shelli Bernal MD on 3/22/2021 at care planning and goals of care     Severe dementia ([facility]     Pressure injury of sacral region, stage 3 ([address]     Acute cystitis without hematuria     Osteomyelitis of right foot (HCC)     Osteomyelitis of right foot, unspecified type ([facility]        Osteomye will require inpatient services that will reasonably be expected to span two midnight's based on the clinical documentation in H+P. Based on patients current state of illness, I anticipate that, after discharge, patient will require TBD.       WEAKNESS- P from family. Our palliative care service was asked by Dr. Korey Craig to evaluate the patient for a goals of care discussion. Today is day #1 of hospitalization.      When I entered the patient's room, she was sleeping and did not wake up when called by name is the fifth hospitalization in the past 6  months.       Medical History:[HG.1]  Past Medical History:   Diagnosis Date   • Anemia    • Anemia    • Anxiety state    • Bladder stone    • Deep vein thrombosis (HCC)    • Dementia ([facility]    • Depression    • Cathern Roel work      Partial Assist   Normal or reduced      Full or confused                                   Extensive Disease    40   Mainly in bed    Can't do any work      Mainly Assist    Normal or reduced     Full or confused 02/01/2021[HG.2]       Chemistry:[HG.1]  Lab Results   Component Value Date    CREATSERUM 0.84 03/23/2021    BUN 14 03/23/2021     03/23/2021    K 3.8 03/23/2021     03/23/2021    CO2 23.0 03/23/2021    GLU 86 03/23/2021    CA 8.6 03/23/2021 because of pt's nutritional status and bed bound status. -- they wish to continue with Palliative care at the [facility]. Palliative Care Follow Up:will sign off at this time as goals of care are clear.      A total of   50 minutes were spent on this consult, nursing home on 3/22/2021 with c/o chronic foot wounds. PMHx includes but not limited to dysphagia, esophageal reflux,[NR.1] dementia,[NR.2] hypolipidemia, and thyroid disease. Patient was hospitalized last week d/t decubitus.  Patient evaluated today d/t m warranted  Discharge Recommendations/Plan: Undetermined    HISTORY   MEDICAL HISTORY  Reason for Referral: Altered diet consistency    Problem List  Principal Problem:    Osteomyelitis of right foot, unspecified type [facility])  Active Problems:    Palliative c Appears intact  (Please note: Silent aspiration cannot be evaluated clinically.  Videofluoroscopic Swallow Study is required to rule-out silent aspiration.)    Esophageal Phase of Swallow: No complaints consistent with possible esophageal involvement

## (undated) NOTE — ED AVS SNAPSHOT
[facility] Emergency Department    [place]  [address] [id number]    Phone:  [phone]    Fax:  [address] Kraig Silva   MRN: [id number]    Department:  [facility] Emergency Department   Date of Visit:  3/1 IF THERE IS ANY CHANGE OR WORSENING OF YOUR CONDITION, CALL YOUR PRIMARY CARE PHYSICIAN AT ONCE OR RETURN IMMEDIATELY TO THE EMERGENCY DEPARTMENT.     If you have been prescribed any medication(s), please fill your prescription right away and begin taking t

## (undated) NOTE — IP AVS SNAPSHOT
Patient Demographics     Address  [place] [facility] [place] [address] Phone  [phone] [facility]) *Preferred*  [phone] [facility]) E-mail Address  [email or web address]      Emergency Contact(s)     Name Relation Home Work [phone] [address] Mckenzie,Agustin 200 Dulcolax 10 MG Supp  Generic drug: bisacodyl      Place 10 mg rectally Q24H PRN. escitalopram 10 MG Tabs  Commonly known as: LEXAPRO  Next dose due: Tuesday at 9am      Take 10 mg by mouth daily.           ferrous sulfate 325 (65 FE) MG Tbe [id number] Pantoprazole Sodium (PROTONIX) EC tab 40 mg 02/08/21 0600 Given      [id number] Vitamin B-12 (VITAMIN B12) tab 100 mcg 02/08/21 0927 Given      [id number] atorvastatin (LIPITOR) tab 20 mg 02/07/21 2040 Given      [id number] ceFAZolin (ANCEF) IVPB 1 Type Source Collected On   Blood — 01/30/21 2304            SERUM MONOCLONAL PROTEIN STUDY [[id number]] (Abnormal)  Resulted: 02/08/21 0959, Result status: Final result   Ordering provider: Neil Boyce MD  01/31/21 1152 Resulting lab: [facility] Sodium 138 136 - 145 mmol/L — Edward [facility])   Potassium 3.4 3.5 - 5.1 mmol/L L [facility])   Chloride 107 98 - 112 mmol/L — [facility] ([facility])   CO2 24.0 21.0 - 32.0 mmol/L — [place] ([facility])   Anion Gap 7 0 - 18 mmol/L — [facility])   BUN 11 7 2817 [facility]) [address] [facility]) Rosa Bean  [phone] 03/19/20 1441 - Present    [address] [place] [address]) [place] [facility]) Sheri Crigler. Barbra ROLDAN Elderly female with advanced dementia with multiple advanced complex Somewhat irreversible comorbiditiesWas recently assigned to our service as a new patient at [address] [place] [address] where she was noted to have anemia with hemoglobin of 6.8 on routine •  Zinc Oxide 15 % External Cream, Apply 1 Application topically as needed. R buttock    •  Dextrose-Sodium Chloride (DEXTROSE-NACL) 5-0.45 % Intravenous Solution, Inject 83 mL/hr into the vein continuous.  Every shift for elevated BUN and sodium 3L    •  c Heart:    Regular rate and rhythm, S1 and S2 normal,    Abdomen:     Soft, non-tender, bowel sounds active all four quadrants,                Extremities:    no cyanosis, icterus or edema         Neurologic :                                  General weakne Sinus rhythm with Premature atrial complexes Otherwise normal ECG When compared with ECG of 07-JUL-2018 17:22, Vent. rate has increased BY  31 BPM Non-specific change in ST segment in Inferior leads[SK. 2]       Xr Chest Ap Portable  (cpt=71045)    Result D Acute kidney injury ([facility]     Severe dehydration     COVID-19     Anemia, unspecified type     Gastrointestinal hemorrhage, unspecified gastrointestinal hemorrhage type     Dehydration[SK. 2]    N39.0 URINARY TRACT INFECTION, SITE NOT SPECIFIED   F01.50 History of thrombosis–was on Xarelto, currently on hold    GERD–pantoprazole    DVT prophylaxis–will avoid blood thinners due to possible GI bleed, SCDs for now      WEAKNESS- PT OT EVAL AND TREAT  DISPOSITION PER     See tests ordered,  Avail Bladder stones, removal of retained Rt ureteral stent, Hx RLE DVT. Anemia. COVID-19 infection in 12/202[RH.2]0. Free T4 normal.[RH.3]    Recommendations:  1) Taper OFF Aricept. Decrease to 5mg nightly. When discharged, stop it.  Daughter wants meds streamli Past Psychiatric History:[RH.1] Severe dementia. [RH.3]     Psych Family History:[RH.1] None[RH.3]    Social and Developmental History:[RH.1] . She has a son and daughter. She lives in a skilled nursing facility. [RH.3]     Past Medical History:   Felicia •  Memantine HCl (NAMENDA) tab 10 mg, 10 mg, Oral, BID  •  Pantoprazole Sodium (PROTONIX) EC tab 40 mg, 40 mg, Oral, QAM AC  •  bisacodyl (DULCOLAX) rectal suppository 10 mg, 10 mg, Rectal, Q24H PRN  •  ondansetron HCl (ZOFRAN) injection 4 mg, 4 mg, Leslie Beets Electronically signed by Enrico Gotti MD on 2/6/2021 11:50 AM   Attribution Miller    RH. 1 - Enrico Gotti MD on 2/5/2021  1:13 PM  RH. 2 - Enrico Gotti MD on 2/5/2021  1:57 PM  RH. 3 - Enrico Gotti MD on 2/6/2021 11:36 AM  RH. 4 - Enrico Gotti MD on 2/5/202 Azotemia     Hyperglycemia     Acute cystitis with hematuria     Acute kidney injury (HCC)     Severe dehydration     COVID-19     Anemia, unspecified type     Gastrointestinal hemorrhage, unspecified gastrointestinal hemorrhage type     Dehydration PROCEDURE:  US VENOUS DOPPLER LEG BILAT - DIAG IMG (CPT=93970)  COMPARISON:  [address], US, US VENOUS DOPPLER LEG BILAT - DIAG IMG (CPT=93970), 5/13/2015, 12:56 PM.  INDICATIONS:  Bilateral lower extremity edema  TECHNIQUE:  Real time, grey scale, and du PROCEDURE:  CT ABDOMEN+PELVIS KIDNEYSTONE 2D RNDR(NO IV,NO ORAL)(CPT=74176)  COMPARISON:  [facility] , CT, CT ABDOMEN+PELVIS(CPT=74176), 12/24/2020, 9:55 PM.  INDICATIONS:  Urolithiasis  TECHNIQUE:  Unenhanced multislice CT scanning from above the kidneys to b tract imaging, with inherent limitations thereof. ADRENALS:  Unremarkable. LIVER:  Unremarkable. BILIARY:  Unremarkable. PANCREAS:  Unremarkable. SPLEEN:  Unremarkable. AORTA/VASCULAR:  No aortic aneurysm. RETROPERITONEUM:  Unremarkable.    BOW PROCEDURE:  XR CHEST AP PORTABLE  (CPT=71045)  TECHNIQUE:  AP chest radiograph was obtained.   COMPARISON:  [facility] , XR, XR CHEST AP PORTABLE  (CPT=71045), 12/22/2020, 5:43 PM.  INDICATIONS:  Abnormal Labs  PATIENT STATED HISTORY: (As transcribed by Valentine Fix PROCEDURE:  IR IVC FILTER PLACEMENT  COMPARISON:  None. INDICATIONS:  DVT and bleeding.   Per minute filter placement per Dr. Jaquelin Rodriguez:  Prior to the procedure, I discussed with the patient and/or legal representative the potential benefits, risks, calibrated with a ruler. The filter deployment system was advanced over an Amplatz wire and the filter was deployed in the infrarenal IVC under fluoroscopic guidance. Follow up imaging shows proper full deployment of an infrarenal IVC filter.   The intro Last data filed at 2/8/2021 0845      Gross per 24 hour   Intake 2593 ml   Output 2050 ml   Net 543 ml         Exam        Head:    Normocephalic,  atraumatic   Neck:   Supple, symmetrical, trachea midline,        thyroid:      no JVD   Lungs:     Clear to     <10,000 cfu/ml Multiple species present- probable contamination.              Xr Abdomen (1 View) (cpt=74018)     Result Date: 1/31/2021  PROCEDURE:  XR ABDOMEN (1 VIEW) (CPT=74018)  INDICATIONS:  IVC filter placement  COMPARISON:  None.   TECHNIQUE:  S • docusate sodium  100 mg Oral BID   • atorvastatin  20 mg Oral Nightly   • cyanocobalamin  100 mcg Oral Daily   • escitalopram  10 mg Oral Daily   • Pantoprazole Sodium  40 mg Oral QAM AC   • PEG 3350  17 g Oral Daily   • Hydrocortisone Acetate  25 mg Rec Active Problems:    Anemia    Gastrointestinal hemorrhage, unspecified gastrointestinal hemorrhage type    Dehydration    Chronic deep vein thrombosis (DVT) of proximal vein of right lower extremity (HCC)    Folic acid deficiency    Acute deep vein thrombo Acute renal failure due to poor intake due to dehydration–IV fluids     History of thrombosis–was on Xarelto, currently on hold   Venous Doppler showing DVT–hematology following  IVC filter placement 2/1/2021–agree with hematology Eliquis 2.5 twice daily o Take 1,000 mcg by mouth daily. ferrous sulfate 325 (65 FE) MG Oral Tab EC  Take 325 mg by mouth 3 (three) times daily with meals. escitalopram 10 MG Oral Tab  Take 10 mg by mouth daily. !! - Potential duplicate medications found.  Please discus Pt seen for dysphagia tx to assess tolerance with recommended diet, ensure proper utilization of aspiration precautions and provide pt/family education. Patient alert and reclined in bed. She is answering yes and no questions.   She verbally refused po bu If you have any questions, please contact Michelle Tejeda MS CCC-SLP  Pager [phone]    Prior to entering room, SLP donned appropriate PPE for Patient level of isolation including gown, surgical mask, gloves, and eyewear.  Patient was not mas

## (undated) NOTE — LETTER
[address] [facility] FOR BLOOD OR BLOOD COMPONENTS      In the course of your treatment, it may become necessary to administer a transfusion of blood or blood components.  This form provides basic information concerning this proc If loss of blood poses serious threats in the course of your treatment, THERE IS NO EFFECTIVE ALTERNATIVE TO BLOOD TRANSFUSION.  However, if you have any further questions on this matter, your physician will fully explain the alternatives to you if it has n

## (undated) NOTE — IP AVS SNAPSHOT
Patient Demographics     Address  [address]   [place] [address] Phone  [phone] [facility]) *Preferred*  [phone] [facility]) E-mail Address  [email or web address]      Emergency Contact(s)     Name Relation Home Work [address] [address]sm[address] Cooper,Agustin 200 nightly. Frankie Shultz MD         cyanocobalamin 1000 MCG Tabs      Take 1,000 mcg by mouth daily. Dulcolax 10 MG Supp  Generic drug: bisacodyl      Place 10 mg rectally Q24H PRN.           escitalopram 10 MG Tabs  Commonly known as: Buddy Arrington 03/18/21 0923 Given      [id number] Vitamin B-12 (VITAMIN B12) tab 1,000 mcg 03/18/21 0923 Given      [id number] clonazePAM (KLONOPIN) tab 0.25 mg 03/17/21 2009 Given      [id number] escitalopram (LEXAPRO) tablet 10 mg 03/18/21 8367 Given            LEFT LOWE - 145 mmol/L — [facility])   Potassium 3.6 3.5 - 5.1 mmol/L Ty [facility])   Chloride 113 98 - 112 mmol/L H Edward [facility] ([facility])   CO2 20.0 21.0 - 32.0 mmol/L L [place] ([facility])   Anion Gap 8 0 - 18 mmol/L — [place] [facility] ([facility])   BUN 19 7 - 18 mg/dL H Ed LAB ([facility])    Specimen Information    Type Source Collected On   Blood — 03/17/21 3623          Components    Component Value Reference Range Flag Lab   Vancomycin Trough 12.4 10.0 - 20.0 ug/mL — [facility])            Testing Perfor Status: Preliminary result Updated: 03/17/21 1900    Specimen: Blood,peripheral      Blood Culture Result No Growth 2 Days    Urine Culture, Routine Once [[id number]] Collected: 03/15/21 1257    Order Status: Completed Lab Status: Final result Updated: 03/1 needing surgery and infectious disease intervention. Please note this patient had these wounds present upon admit to [facility], and there have been multiple palliative and other end-of-life wishes discussions with patient's family.   Patient thrombosis ([facility]    • Dementia ([facility]    • Depression    • Disorder of thyroid    • DVT (deep venous thrombosis) (HCC)     RLE   • Dysphagia    • Esophageal reflux    • Failure to thrive in adult    • Hallucinations    • Hydronephrosis    • Hyperlipidemia 1000 MCG Oral Tab, Take 1,000 mcg by mouth daily. ferrous sulfate 325 (65 FE) MG Oral Tab EC, Take 325 mg by mouth 3 (three) times daily with meals. escitalopram 10 MG Oral Tab, Take 10 mg by mouth daily. [SK.2]        Review of Systems:   Pertinent i 220.0 03/16/2021    CREATSERUM 1.06 (H) 03/16/2021    BUN 32 (H) 03/16/2021     (H) 03/16/2021    K 4.0 03/16/2021     (H) 03/16/2021    CO2 23.0 03/16/2021    GLU 90 03/16/2021    CA 8.3 (L) 03/16/2021    ALB 2.1 (L) 03/16/2021    ALKPHO 47 (L deficiency     Acute deep vein thrombosis (DVT) of popliteal vein of right lower extremity (HCC)     Goals of care, counseling/discussion     Palliative care encounter     Counseling regarding advance care planning and goals of care     Severe dementia (HC Maria E Escobar MD at 3/17/2021  5:16 PM     Author: Marylen Shore, MD Service: Palliative Care Author Type: Physician    Filed: 3/17/2021  5:16 PM Date of Service: 3/17/2021  4:52 PM Status: Signed    : Marylen Shore, MD (Physician)     Consult Orders about what has Doctors been telling him. He said that Doctors have given him grim outlook for the patient.  He wanted her to come to hospital to have wounds checked but he is realistic and he is not going to have pt transferred every month to be cared for w DEBRIDEMENT OF SACRAL DECUBITUS ULCER N/A 3/16/2021    Performed by Tl Triana MD at [facility] MAIN OR   • REMOVE BLADDER STONE,<2.5CM Right 02/04/2021    laser litho retained stent and right ureter stent removal            Palliative Performance Scale: Ciprofloxacin Hcl       NAUSEA ONLY    Medications:     Current Facility-Administered Medications:   •  escitalopram (LEXAPRO) tablet 10 mg, 10 mg, Oral, Daily  •  Vitamin B-12 (VITAMIN B12) tab 1,000 mcg, 1,000 mcg, Oral, Daily  •  ferrous sulfate EC tab 03/16/2021    DDIMER 1.15 (H) 02/01/2021    TROP <0.046 12/31/2017       Imaging:  No results found. Objective:  Vital Signs:  Blood pressure 120/72, pulse (!) 47, temperature 99 °F (37.2 °C), resp.  rate 14, weight 97 lb 11.2 oz (44.3 kg), SpO2 94 %, no services to participate in the care of Mrs. Artur Silva.        Gladys Cortes MD  3/17/2021  4:52 PM  [address] [facility] [phone][HG.1]        Electronically signed by Lyudmila Zazueta MD on 3/17/2021  5 Azotemia     Hyperglycemia     Acute cystitis with hematuria     Acute kidney injury (HCC)     Severe dehydration     COVID-19     Anemia, unspecified type     Gastrointestinal hemorrhage, unspecified gastrointestinal hemorrhage type     Dehydration     Ch Growth 2 Days N/A   4. URINE CULTURE, ROUTINE     Status: None    Collection Time: 03/15/21 12:57 PM    Specimen: Urine, clean catch   Result Value Ref Range    Urine Culture No Growth at 18-24 hrs.  N/A     Reason for Admission: see below    Physical Exam: * 90 70           Recent Labs   Lab 03/15/21   1257 03/16/21   0543 03/18/21   0611   ALT 18 13 14   AST 28 20 19   ALB 2.4* 2.1* 2.0*     No results for input(s): PGLU in the last 168 hours.    No results for input(s): URINE, CULTI, BLDSMR in the la ([facility]   Severe dehydration   COVID-19   Anemia, unspecified type   Gastrointestinal hemorrhage, unspecified gastrointestinal hemorrhage type   Dehydration   Chronic deep vein thrombosis (DVT) of proximal vein of right lower extremity (HCC)   Folic acid def than 7   Hypernatremia, hyperchloremia–due to poor p.o. intake due to dementia–IV fluids, monitor   Palliative care to follow–please refer to my progress note from 3/16/2021 regarding detailed discussion with patient's son as well as please refer to my Gely Douse External Cream  Apply 1 Application topically as needed. R buttock    cyanocobalamin 1000 MCG Oral Tab  Take 1,000 mcg by mouth daily. ferrous sulfate 325 (65 FE) MG Oral Tab EC  Take 325 mg by mouth 3 (three) times daily with meals.       escitalopram Functional Mobility Score. Pt is total assist, non-ambulatory, with history of dementia and agitation and does not follow commands. No skilled IOT is warranted. Will sign off. [AO.1]         Attribution Miller    AO. 1 - Mg Manzo OT on 3/16/2021  9:32 PM revealed an apparent timely initiation of the pharyngeal phase with functional hyolaryngeal elevation on palpation. No coughing or throat clearing noted with current diet consistencies and 1:1 feeding assist provided.    Patient with an impaired oral phase thrive in adult    • Hallucinations    • Hydronephrosis    • Hyperlipidemia    • Hypothyroid    • Kidney stone    • Malnutrition [facility])    • Recurrent UTI    • Syncope    • Thyroid disease     hypothyroid   • Unspecified essential hypertension        Prior Treatment Plan/Recommendations: Aspiration precautions (meal monitor)  Number of Visits to Meet Established Goals: 1  Follow Up Needed: Yes  SLP Follow-up Date: 03/18/21    Thank you for your referral.   If you have any questions, please contact [address] J[

## (undated) NOTE — IP AVS SNAPSHOT
[address]            (For Outpatient Use Only) Initial Admit Date: 3/22/2021   Inpt/Obs Admit Date: Inpt: 3/22/21 / Obs: N/A   Discharge Date:    Lucas Leija:  [de-identified]   MRN: [de-identified]   CSN: [id number]   CEID: [id number] Group Number:  Insurance Type:    Subscriber Name:  Subscriber :    Subscriber ID:  Pt Rel to Subscriber:    Hospital Account Financial Class: Medicare    2021

## (undated) NOTE — LETTER
Claudia [address]jada [address]  [address] [facility] [address], [address] [facility]  Authorization for Surgical Operation and Procedure     Date:___________                                                                                                         Time:__________ that can occur: fever and allergic reactions, hemolytic reactions, transmission of diseases such as Hepatitis, AIDS and Cytomegalovirus (CMV) and fluid overload.   In the event that I wish to have an autologous transfusion of my own blood, or a directed don when the applicable recovery period ends for purposes of reinstating the DNAR order.   10. Patients having a sterilization procedure: I understand that if the procedure is successful the results will be permanent and it will therefore be impossible for me t to give me medicine and do additional procedures as necessary. Some examples are: Starting or using an “IV” to give me medicine, fluids or blood during my procedure, and having a breathing tube placed to help me breathe when I’m asleep (intubation).  In the that rare but potential complications include headache, bleeding, infection, seizure, irregular heart rhythms, and nerve injury.     I can change my mind about having anesthesia services at any time before I get the medicine.    ____________________________

## (undated) NOTE — IP AVS SNAPSHOT
Patient Demographics     Address  [place]  [address] Phone  [phone] (Home) *Preferred*  [phone] [facility]) E-mail Address  [email or web address]      Emergency Contact(s)     Name Relation Home Work [facility] Take 50 mcg by mouth before breakfast.          Memantine HCl 10 MG Tabs  Commonly known as: NAMENDA      Take 10 mg by mouth 2 (two) times daily.           Pantoprazole Sodium 40 MG Tbec  Commonly known as: PROTONIX      Take 1 tablet (40 mg total) by jerzy Testing Performed By     Ju Russell Name Director Address Valid Date Range    [address] [facility]) Esha[address] [facility]) Chely Pollock  [address] [id number] 03/19/20 1441 - Present H&P signed by Miki Wright MD at 12/23/2020 11:05 AM  Version 1 of 1    Author: Miki Wright MD Service: Hospitalist Author Type: Physician    Filed: 12/23/2020 11:05 AM Date of Service: 12/23/2020  7:04 AM Status: Signed    : Miki Wright MD ([phone] Ciprofloxacin Hcl       NAUSEA ONLY[OO. 2]    Medications:[OO.1]  No current facility-administered medications on file prior to encounter. •  cephALEXin 250 MG Oral Cap, Take 250 mg by mouth daily. , Disp: , Rfl:     •  fluconazole 100 MG Oral Tab, Take Neurologic: No focal neurological deficits. CNII-XII grossly intact. Musculoskeletal: Moves all extremities. Extremities: No edema or cyanosis. Integument: No rashes or lesions. Psychiatric: Appropriate mood and affect.       Diagnostic Data:      Labs Continue Aricept and Namenda  watch for any behavioral changes    Anxiety and depression– (present on admission)  Assessment & Plan  Continue clonazepam as well as Lexapro[OO.3]      Quality:  · DVT Prophylaxis:[OO.1] scd[OO. 3]  · CODE status:[OO.1] dnr[OO No notes of this type exist for this encounter. Physical Therapy Notes (last 72 hours) (Notes from 12/30/2020  2:26 PM through 1/2/2021  2:26 PM)    No notes of this type exist for this encounter.      Occupational Therapy Notes (last 72 hours) (Notes f Aspiration Precautions: Upright position; Slow rate;Small bites and sips; No straw(Teaspoon presentations. )  Medication Administration Recommendations: Crushed in puree    Patient Experiencing Pain: No                Discharge Recommendations  Discharge Rec 12/28 NOC: remain comfortable, sleep  12/29 AM: eat more, get some rest  12/29 NOC: remain comfortable and cooperative   12/30 NOC: remain comfortable and cooperative  1/1 AM: keep comfortable, transfuse blood.    1/1(noc): discharge to caregiver  1/2AM: re

## (undated) NOTE — LETTER
Claudia Tejada [address]  [address] [facility] [address], [address] [facility]  Authorization for Surgical Operation and Procedure     Date:___________                                                                                                         Time:__________ 4.   Should the need arise during my operation or immediate post-operative period, I also consent to the administration of blood and/or blood products.   Further, I understand that despite careful testing and screening of blood or blood products by nona 8.   I recognize that in the event my procedure results in extended X-Ray/fluoroscopy time, I may develop a skin reaction. 9.  If I have a Do Not Attempt Resuscitation (DNAR) order in place, that status will be suspended while in the operating room, proc 1. IMariano agree to be cared for by an anesthesiologist, who is specially trained to monitor me and give me medicine to put me to sleep or keep me comfortable during my procedure    I understand that my anesthesiologist is not an employee or ag 5. My doctor has explained to me other choices available to me for my care (alternatives).   6. Pregnant Patients (“epidural”):  I understand that the risks of having an epidural (medicine given into my back to help control pain during labor), include itchi

## (undated) NOTE — LETTER
Claudia Tejada [address]  [address] [facility] [address], [address] [facility]  Authorization for Surgical Operation and Procedure     Date:___________                                                                                                         Time:__________ that can occur: fever and allergic reactions, hemolytic reactions, transmission of diseases such as Hepatitis, AIDS and Cytomegalovirus (CMV) and fluid overload.   In the event that I wish to have an autologous transfusion of my own blood, or a directed don when the applicable recovery period ends for purposes of reinstating the DNAR order.   10. Patients having a sterilization procedure: I understand that if the procedure is successful the results will be permanent and it will therefore be impossible for me t to give me medicine and do additional procedures as necessary. Some examples are: Starting or using an “IV” to give me medicine, fluids or blood during my procedure, and having a breathing tube placed to help me breathe when I’m asleep (intubation).  In the that rare but potential complications include headache, bleeding, infection, seizure, irregular heart rhythms, and nerve injury.     I can change my mind about having anesthesia services at any time before I get the medicine.    ____________________________

## (undated) NOTE — LETTER
[facility] [address], [address]  Consent for Procedure/Sedation    Date: 2/1/21    Time: 1430      1. I authorize the performance upon Elena De Jesus the following:  Inferior Vena Cava Filter Insertion     2.  I authorize Dr. Lucien Orozco Printed: 2021   2:25 PM  Patient Name: Carmella Arroyo        : 1935       Medical Record #: [id number]

## (undated) NOTE — IP AVS SNAPSHOT
[address]            (For Outpatient Use Only) Initial Admit Date: 12/31/2017   Inpt/Obs Admit Date: Inpt: N/A / Obs: 12/31/17   Discharge Date:    Hospital Acct:  [de-identified]   MRN: [de-identified]   CSN: [id number]        [id number]  [id number] Subscriber Name:  Brook Wheeler :    Subscriber ID:  Pt Rel to Subscriber:    Hospital Account Financial Class: Medicare    2018

## (undated) NOTE — ED AVS SNAPSHOT
Lidia Cordova   MRN: [id number]    Department:  [facility] in [place]   Date of Visit:  8/17/2018           Disclosure     Insurance plans vary and the physician(s) referred by the ER may not be covered by your plan.  Please conta tell this physician (or your personal doctor if your instructions are to return to your personal doctor) about any new or lasting problems. The primary care or specialist physician will see patients referred from the [facility] Emergency Department.  Arthor Bernheim

## (undated) NOTE — ED AVS SNAPSHOT
[facility] Emergency Department    [place]  [address] [address]    Phone:  [phone]    Fax:  [address] Saurabh Zarate   MRN: [id number]    Department:  [facility] Emergency Department   Date of Visit:  3/1 Discharge References/Attachments     DEHYDRATION (ADULT) (ENGLISH)    BLADDER INFECTION, FEMALE (ADULT) (ENGLISH)      Disclosure     Insurance plans vary and the physician(s) referred by the ER may not be covered by your plan.  Please contact your insuranc If you have been prescribed any medication(s), please fill your prescription right away and begin taking the medication(s) as directed    If the emergency physician has read X-rays, these will be re-interpreted by a radiologist.  If there is a significant can help with your Affordable Care Act coverage, as well as all types of Medicaid plans. To get signed up and covered, please call [phone] and ask to get set up for an insurance coverage that is in-network with Vladimir Bradley

## (undated) NOTE — IP AVS SNAPSHOT
Patient Demographics     Address  [address] [address] Phone  [phone] [facility])  [phone] [facility] E-mail Address  [email or web address]      Emergency Contact(s)     Name Relation Home Work [employer] Daughter Next dose due:  1/2/18 - 9 pm      Take 10 mg by mouth nightly.           Levothyroxine Sodium 125 MCG Tabs  Commonly known as:  SYNTHROID, LEVOTHROID  Next dose due:  1/3/18 - 7 am      Take 125 mcg by mouth before breakfast.          Memantine HCl 10 MG T Flowsheet Row Most Recent Value   Patient Weight  61.2 kg (134 lb 14.7 oz)      Lab Results Last 24 Hours    No matching results found     Microbiology Results (All)     Procedure Component Value Units Date/Time    MRSA Culture Only Once [[id number]]     Or smoking use included Cigarettes. She has never used smokeless tobacco. She reports that she drinks alcohol. She reports that she does not use drugs.     Family History:   Family History   Problem Relation Age of Onset   • acute MI [OTHER] Father    • acute Musculoskeletal: Left upper extremity hand contracture, right upper extremity mild hand contracture, full range of motion in all extremities  Extremities: No edema or cyanosis. Integument: No rashes or lesions. Psychiatric: Appropriate mood and affect. FA.1 Briseida Oates MD on 12/31/2017  6:42 PM                        Consults - MD Consult Notes      Consults signed by Jordan Darby DO at 1/1/2018  9:31 AM     Author:  Jordan Darby DO Service:  Neurology Author Type:  Physician    Geovanny Rasmussen Hypokalemia     Transient cerebral ischemia     Transient cerebral ischemia, unspecified type      PMHx:  Past Medical History:   Diagnosis Date   • Dementia    • Hallucinations    • Syncope    • Thyroid disease    • Unspecified essential hypertension REFLEXES:[NM.1] 2+[NM.2] at biceps,[NM.1] 2+[NM.2] triceps,[NM.1] 2+[NM.2] at patella    GAIT: deferred    Labs:    Lab Results  Component Value Date   WBC 7.8 12/31/2017   HGB 12.8 12/31/2017   HCT 39.2 12/31/2017   .0 12/31/2017   CREATSERUM 1.08 NM.1 - Homa Elena DO on 1/1/2018  9:12 AM  NM. 2 - Homa Elena DO on 1/1/2018  9:24 AM                     D/C Summary     No notes of this type exist for this encounter.          Physical Therapy Notes (last 72 hours) (Notes from 12/30/2017 11:16 AM  Version 1 of 1    Author:  Kyree Pro OT Service:  (none) Author Type:  Occupational Therapist    Filed:  1/2/2018 11:16 AM Date of Service:  1/2/2018 11:14 AM Status:  Signed    :  Kyree Pro OT (Occupational Therapist)

## (undated) NOTE — LETTER
Claudia Tejada [address]  [address] [facility] [address], [address] [facility]  Authorization for Surgical Operation and Procedure     Date:___________                                                                                                         Time:__________ that can occur: fever and allergic reactions, hemolytic reactions, transmission of diseases such as Hepatitis, AIDS and Cytomegalovirus (CMV) and fluid overload.   In the event that I wish to have an autologous transfusion of my own blood, or a directed don when the applicable recovery period ends for purposes of reinstating the DNAR order.   10. Patients having a sterilization procedure: I understand that if the procedure is successful the results will be permanent and it will therefore be impossible for me t to give me medicine and do additional procedures as necessary. Some examples are: Starting or using an “IV” to give me medicine, fluids or blood during my procedure, and having a breathing tube placed to help me breathe when I’m asleep (intubation).  In the that rare but potential complications include headache, bleeding, infection, seizure, irregular heart rhythms, and nerve injury.     I can change my mind about having anesthesia services at any time before I get the medicine.    ____________________________

## (undated) NOTE — IP AVS SNAPSHOT
[facility] [place]  [address] [place], [address] ~ [phone]                Discharge Summary   4/14/2017    Matheus Reyes           Admission Information        Provider Department    4/14/2017 Bin Pat MD  3sw-A Commonly known as:  KLONOPIN        Take 0.25 mg by mouth every 6 (six) hours as needed.                             Donepezil HCl 10 MG Tabs   Last time this was given:  10 mg on 4/15/2017  8:14 PM   Commonly known as:  ARICEPT        Take 10 mg by mouth n Recent Hematology Lab Results (cont.)  (Last 3 results in the past 90 days)    Neutrophil % Lymphocyte % Monocyte % Eosinophil % Basophil % Prelim Neut Abs Final Neut Abs Lymphocyte Abso Monocyte Absolu Eosinophil Abso Basophil Absolu    (04/14/17)  73.2 ( coverage. Patient [address] is a Federal Navigator program that can help with your Affordable Care Act coverage, as well as all types of Medicaid plans.   To get signed up and covered, please call [phone] and ask to get set up for an insuran What to report to your healthcare team: Tolerance of medications, temperature, rash, itching, shortness of breath, chills, nausea, and diarrhea           Endocrine Medications     Levothyroxine Sodium 125 MCG Oral Tab         Use: Regulate metabolism and i

## (undated) NOTE — IP AVS SNAPSHOT
[address]            (For Outpatient Use Only) Initial Admit Date: 3/15/2021   Inpt/Obs Admit Date: Inpt: 3/16/21 / Obs: 03/15/21   Discharge Date:    Eugenia Copier:  [de-identified]   MRN: [de-identified]   CSN: [id number]   CEID: [id number] Subscriber: SELF   TERTIARY INSURANCE   Payor:  Plan:    Group Number:  Insurance Type:    Subscriber Name:  Subscriber :    Subscriber ID:  Pt Rel to Subscriber:    Hospital Account Financial Class: Medicare    2021

## (undated) NOTE — ED AVS SNAPSHOT
Pepe Seaman   MRN: [id number]    Department:  [facility] Emergency Department   Date of Visit:  7/7/2018           Disclosure     Insurance plans vary and the physician(s) referred by the ER may not be covered by your plan.  Please contact you tell this physician (or your personal doctor if your instructions are to return to your personal doctor) about any new or lasting problems. The primary care or specialist physician will see patients referred from the [facility] Emergency Department.  Kevin Echevarria

## (undated) NOTE — IP AVS SNAPSHOT
[address]            (For Outpatient Use Only) Initial Admit Date: 12/22/2020   Inpt/Obs Admit Date: Inpt: 12/22/20 / Obs: N/A   Discharge Date:    [place]:  [de-identified]   MRN: [de-identified]   CSN: [id number]   CEID: [id number] Subscriber ID: [id number] Pt Rel to Subscriber: SELF   TERTIARY INSURANCE   Payor:  Plan:    Group Number:  Insurance Type:    Subscriber Name:  Subscriber :    Subscriber ID:  Pt Rel to Subscriber:    Hospital Account Financial Class: Medicare    Feliciano

## (undated) NOTE — IP AVS SNAPSHOT
[address]            (For Outpatient Use Only) Initial Admit Date: 1/30/2021   Inpt/Obs Admit Date: Inpt: 2/1/21 / Obs: 01/31/21   Discharge Date:    Skip Alek:  [de-identified]   MRN: [de-identified]   CSN: [id number]   CEID: [id number] T[employer]IARY INSURANCE   Payor:  Plan:    Group Number:  Insurance Type:    Subscriber Name:  Subscriber :    Subscriber ID:  Pt Rel to Subscriber:    Hospital Account Financial Class: Medicare    2021

## (undated) NOTE — LETTER
[facility] FOR BLOOD OR BLOOD COMPONENTS      In the course of your treatment, it may become necessary to administer a transfusion of blood or blood components.  This form provides basic information concerning this proc If loss of blood poses serious threats in the course of your treatment, THERE IS NO EFFECTIVE ALTERNATIVE TO BLOOD TRANSFUSION.  However, if you have any further questions on this matter, your physician will fully explain the alternatives to you if it has n